# Patient Record
Sex: FEMALE | Race: WHITE | NOT HISPANIC OR LATINO | Employment: FULL TIME | URBAN - METROPOLITAN AREA
[De-identification: names, ages, dates, MRNs, and addresses within clinical notes are randomized per-mention and may not be internally consistent; named-entity substitution may affect disease eponyms.]

---

## 2021-03-25 ENCOUNTER — IMMUNIZATION (OUTPATIENT)
Dept: NURSING | Facility: CLINIC | Age: 51
End: 2021-03-25
Payer: COMMERCIAL

## 2021-03-25 PROCEDURE — 0001A PR COVID VAC PFIZER DIL RECON 30 MCG/0.3 ML IM: CPT

## 2021-03-25 PROCEDURE — 91300 PR COVID VAC PFIZER DIL RECON 30 MCG/0.3 ML IM: CPT

## 2021-04-15 ENCOUNTER — IMMUNIZATION (OUTPATIENT)
Dept: NURSING | Facility: CLINIC | Age: 51
End: 2021-04-15
Attending: FAMILY MEDICINE
Payer: COMMERCIAL

## 2021-04-15 PROCEDURE — 91300 PR COVID VAC PFIZER DIL RECON 30 MCG/0.3 ML IM: CPT

## 2021-04-15 PROCEDURE — 0002A PR COVID VAC PFIZER DIL RECON 30 MCG/0.3 ML IM: CPT

## 2021-04-18 ENCOUNTER — HEALTH MAINTENANCE LETTER (OUTPATIENT)
Age: 51
End: 2021-04-18

## 2021-10-02 ENCOUNTER — HEALTH MAINTENANCE LETTER (OUTPATIENT)
Age: 51
End: 2021-10-02

## 2022-05-14 ENCOUNTER — HEALTH MAINTENANCE LETTER (OUTPATIENT)
Age: 52
End: 2022-05-14

## 2022-05-26 ENCOUNTER — OFFICE VISIT (OUTPATIENT)
Dept: FAMILY MEDICINE | Facility: CLINIC | Age: 52
End: 2022-05-26
Payer: COMMERCIAL

## 2022-05-26 VITALS
DIASTOLIC BLOOD PRESSURE: 80 MMHG | HEART RATE: 97 BPM | BODY MASS INDEX: 36.54 KG/M2 | OXYGEN SATURATION: 97 % | WEIGHT: 214 LBS | TEMPERATURE: 98.7 F | SYSTOLIC BLOOD PRESSURE: 144 MMHG | HEIGHT: 64 IN

## 2022-05-26 DIAGNOSIS — M54.50 ACUTE MIDLINE LOW BACK PAIN WITHOUT SCIATICA: Primary | ICD-10-CM

## 2022-05-26 LAB
ALBUMIN UR-MCNC: NEGATIVE MG/DL
APPEARANCE UR: CLEAR
BACTERIA #/AREA URNS HPF: ABNORMAL /HPF
BILIRUB UR QL STRIP: ABNORMAL
COLOR UR AUTO: YELLOW
GLUCOSE UR STRIP-MCNC: NEGATIVE MG/DL
HGB UR QL STRIP: ABNORMAL
HYALINE CASTS #/AREA URNS LPF: ABNORMAL /LPF
KETONES UR STRIP-MCNC: NEGATIVE MG/DL
LEUKOCYTE ESTERASE UR QL STRIP: NEGATIVE
MUCOUS THREADS #/AREA URNS LPF: PRESENT /LPF
NITRATE UR QL: NEGATIVE
PH UR STRIP: 5 [PH] (ref 5–7)
RBC #/AREA URNS AUTO: ABNORMAL /HPF
SP GR UR STRIP: >=1.03 (ref 1–1.03)
SQUAMOUS #/AREA URNS AUTO: ABNORMAL /LPF
URATE CRY #/AREA URNS HPF: ABNORMAL /HPF
UROBILINOGEN UR STRIP-ACNC: 0.2 E.U./DL
WBC #/AREA URNS AUTO: ABNORMAL /HPF

## 2022-05-26 PROCEDURE — 99203 OFFICE O/P NEW LOW 30 MIN: CPT | Performed by: PHYSICIAN ASSISTANT

## 2022-05-26 PROCEDURE — 81001 URINALYSIS AUTO W/SCOPE: CPT | Performed by: PHYSICIAN ASSISTANT

## 2022-05-26 RX ORDER — BUSPIRONE HYDROCHLORIDE 30 MG/1
1 TABLET ORAL 2 TIMES DAILY
COMMUNITY
Start: 2022-05-02 | End: 2024-07-02

## 2022-05-26 RX ORDER — PREDNISONE 20 MG/1
TABLET ORAL
Qty: 20 TABLET | Refills: 0 | Status: SHIPPED | OUTPATIENT
Start: 2022-05-26 | End: 2022-06-07

## 2022-05-26 RX ORDER — CYCLOBENZAPRINE HCL 5 MG
5 TABLET ORAL
Qty: 12 TABLET | Refills: 1 | Status: SHIPPED | OUTPATIENT
Start: 2022-05-26 | End: 2022-06-07

## 2022-05-26 RX ORDER — SENNOSIDES 8.6 MG
650 CAPSULE ORAL EVERY 8 HOURS PRN
Qty: 60 TABLET | Refills: 1 | Status: SHIPPED | OUTPATIENT
Start: 2022-05-26 | End: 2024-07-02

## 2022-05-26 ASSESSMENT — ASTHMA QUESTIONNAIRES: ACT_TOTALSCORE: 25

## 2022-05-26 ASSESSMENT — ANXIETY QUESTIONNAIRES
3. WORRYING TOO MUCH ABOUT DIFFERENT THINGS: NOT AT ALL
7. FEELING AFRAID AS IF SOMETHING AWFUL MIGHT HAPPEN: NOT AT ALL
5. BEING SO RESTLESS THAT IT IS HARD TO SIT STILL: NOT AT ALL
1. FEELING NERVOUS, ANXIOUS, OR ON EDGE: NOT AT ALL
IF YOU CHECKED OFF ANY PROBLEMS ON THIS QUESTIONNAIRE, HOW DIFFICULT HAVE THESE PROBLEMS MADE IT FOR YOU TO DO YOUR WORK, TAKE CARE OF THINGS AT HOME, OR GET ALONG WITH OTHER PEOPLE: SOMEWHAT DIFFICULT
GAD7 TOTAL SCORE: 4
GAD7 TOTAL SCORE: 4
6. BECOMING EASILY ANNOYED OR IRRITABLE: MORE THAN HALF THE DAYS
2. NOT BEING ABLE TO STOP OR CONTROL WORRYING: NOT AT ALL

## 2022-05-26 ASSESSMENT — PATIENT HEALTH QUESTIONNAIRE - PHQ9
SUM OF ALL RESPONSES TO PHQ QUESTIONS 1-9: 3
5. POOR APPETITE OR OVEREATING: MORE THAN HALF THE DAYS

## 2022-05-26 NOTE — PROGRESS NOTES
Assessment & Plan     Acute midline low back pain without sciatica  - UA macro with reflex to Microscopic and Culture - [facility] Collect  - XR Lumbar Spine 2/3 Views; Future  - predniSONE (DELTASONE) 20 MG tablet; Take 3 tabs by mouth daily x 3 days, then 2 tabs daily x 3 days, then 1 tab daily x 3 days, then 1/2 tab daily x 3 days.  - acetaminophen (TYLENOL) 650 MG CR tablet; Take 1 tablet (650 mg) by mouth every 8 hours as needed for pain  - cyclobenzaprine (FLEXERIL) 5 MG tablet; Take 1 tablet (5 mg) by mouth at bedtime as needed, may repeat once for muscle spasms  - Physical Therapy Referral; Future            Return in about 6 weeks (around 7/7/2022) for follow up if symptoms persist, change or worsen.    Mercy Kaur PA-C  [facility]RI Quinn is a 51 year old who presents for the following health issues  accompanied by her self.    History of Present Illness       Back Pain:  She presents for follow up of back pain. Patient's back pain is a new problem.    Original cause of back pain: not sure  First noticed back pain: 1-4 weeks ago  Patient feels back pain: constantlyLocation of back pain:  Right lower back and left lower back  Description of back pain: dull ache, sharp, shooting and stabbing  Back pain spreads: right buttocks and left buttocks    Since patient first noticed back pain, pain is: gradually worsening  Does back pain interfere with her job:  Yes  On a scale of 1-10 (10 being the worst), patient describes pain as:  5  What makes back pain worse: bending, coughing, certain positions, lying down, sitting and standing  Acupuncture: not tried  Acetaminophen: not helpful  Activity or exercise: not helpful  Chiropractor:  Not tried  Cold: not helpful  Heat: not helpful  Massage: not tried  Muscle relaxants: not tried  NSAIDS: not helpful  Opioids: not tried  Physical Therapy: not tried  Rest: not helpful  Steroid Injection: not tried  Stretching: not  "helpful  Surgery: not tried  TENS unit: not tried  Topical pain relievers: not helpful  Other healthcare providers patient is seeing for back pain: None    She eats 0-1 servings of fruits and vegetables daily.She consumes 1 sweetened beverage(s) daily.She exercises with enough effort to increase her heart rate 9 or less minutes per day.  She exercises with enough effort to increase her heart rate 3 or less days per week.   She is taking medications regularly.     Patient hasn't had these sx before.  Notes that heat helps better than cold.      Review of Systems   Constitutional, HEENT, cardiovascular, pulmonary, gi and gu systems are negative, except as otherwise noted.      Objective    BP (!) 144/80   Pulse 97   Temp 98.7  F (37.1  C) (Oral)   Ht 1.613 m (5' 3.5\")   Wt 97.1 kg (214 lb)   SpO2 97%   BMI 37.31 kg/m    Body mass index is 37.31 kg/m .     Physical Exam   GENERAL: healthy, alert and no distress  MS: no gross musculoskeletal defects noted, no edema  SKIN: no suspicious lesions or rashes  Comprehensive back pain exam:  No tenderness, Pain limits the following motions: forward flexion, Lower extremity strength functional and equal on both sides, Lower extremity reflexes within normal limits bilaterally and Straight leg raise negative bilaterally                "

## 2022-06-03 ASSESSMENT — ENCOUNTER SYMPTOMS
ABDOMINAL PAIN: 0
DYSURIA: 0
FEVER: 0
ARTHRALGIAS: 1
DIARRHEA: 0
BREAST MASS: 0
SHORTNESS OF BREATH: 0
WEAKNESS: 0
NAUSEA: 0
EYE PAIN: 0
DIZZINESS: 0
FREQUENCY: 0
CHILLS: 0
HEMATURIA: 1
COUGH: 0
HEMATOCHEZIA: 0
CONSTIPATION: 0
HEADACHES: 0
SORE THROAT: 0
PALPITATIONS: 0
JOINT SWELLING: 0
NERVOUS/ANXIOUS: 0
MYALGIAS: 0
PARESTHESIAS: 0
HEARTBURN: 0

## 2022-06-06 ENCOUNTER — THERAPY VISIT (OUTPATIENT)
Dept: PHYSICAL THERAPY | Facility: CLINIC | Age: 52
End: 2022-06-06
Attending: PHYSICIAN ASSISTANT
Payer: COMMERCIAL

## 2022-06-06 DIAGNOSIS — M54.50 BILATERAL LOW BACK PAIN WITHOUT SCIATICA: ICD-10-CM

## 2022-06-06 DIAGNOSIS — M54.50 ACUTE MIDLINE LOW BACK PAIN WITHOUT SCIATICA: ICD-10-CM

## 2022-06-06 PROCEDURE — 97110 THERAPEUTIC EXERCISES: CPT | Mod: GP | Performed by: PHYSICAL THERAPIST

## 2022-06-06 PROCEDURE — 97161 PT EVAL LOW COMPLEX 20 MIN: CPT | Mod: GP | Performed by: PHYSICAL THERAPIST

## 2022-06-06 PROCEDURE — 97112 NEUROMUSCULAR REEDUCATION: CPT | Mod: GP | Performed by: PHYSICAL THERAPIST

## 2022-06-06 NOTE — PROGRESS NOTES
Physical Therapy Initial Evaluation  Subjective:  The history is provided by the patient.   Patient Health History  Cheyenne Schulte being seen for LBP.     Problem began: 5/26/2022.   Problem occurred: insidious   Pain is reported as 5/10 on pain scale.  General health as reported by patient is fair.  Pertinent medical history includes: asthma, depression, history of fractures, migraines/headaches, overweight and smoking.   Red flags:  None as reported by patient.  Medical allergies: other. Other medical allergies details: Wellbutrin, theophylline.   Surgeries include:  Orthopedic surgery. Other surgery history details: ORIF R lower leg.    Current medications:  Anti-depressants, anti-inflammatory, muscle relaxants and steroids. Other medications details: oral steroids with last day today which has helped by about 50-60 %.    Current occupation is  operations.   Primary job tasks include:  Computer work and prolonged sitting.                  Therapist Generated HPI Evaluation  Problem details: Patient reports that she has had LBP for about a month or so for no known reason and on 5-14-22, she was out hiking/walking with her dog [place] and she felt much worse after this. MD order from 5-26-22..         Type of problem:  Lumbar.    This is a new condition.  Condition occurred with:  Insidious onset.  Where condition occurred: for unknown reasons.  Patient reports pain:  Central lumbar spine.  Pain is described as sharp and aching and is constant (constant ache and intermittent sharp).  Pain radiates to:  No radiation. Pain is worse in the A.M..  Since onset symptoms are gradually improving (with the steroids, but now the same).  Associated symptoms:  Loss of motion/stiffness. Symptoms are exacerbated by bending, lifting, other and sitting (BB, sit marya 45' with 5/10 pain)  and relieved by other, ice and analgesics (steroids, marijuana).  Special tests included:  X-ray  (see Epic).    Restrictions due to condition include:  Working in normal job without restrictions.  Barriers include:  None as reported by patient.                        Objective:  System         Lumbar/SI Evaluation  ROM:    AROM Lumbar:   Flexion:          Fingertips to mid thigh and increase  Ext:                    Mod loss and increase   Side Bend:        Left:     Right:   Rotation:           Left:     Right:   Side Glide:        Left:  Min loss and slight ache    Right:  Min loss and slight ache          Lumbar Myotomes:  normal            Lumbar DTR's:  normal      Cord Signs:  normal    Lumbar Dermtomes:  not assessed                Neural Tension/Mobility:  Lumbar:  Normal (+ cough/sneeze)        Lumbar Palpation:    Tenderness present at Left:    Quadratus Lumborum and Erector Spinae  Tenderness present at Right: Quadratus Lumborum and Erector Spinae      Spinal Segmental Conclusions:     Level: Hypo noted at L3, L2, S1, L5 and L4                                                   General     ROS    Assessment/Plan:    Patient is a 51 year old female with lumbar complaints.    Patient has the following significant findings with corresponding treatment plan.                Diagnosis 1:  LBP  Pain -  US, manual therapy, self management, education, directional preference exercise and home program  Decreased ROM/flexibility - manual therapy, therapeutic exercise and home program  Impaired muscle performance - neuro re-education and home program  Decreased function - therapeutic activities and home program  Impaired posture - neuro re-education and home program    Therapy Evaluation Codes:   1) History comprised of:   Personal factors that impact the plan of care:      None.    Comorbidity factors that impact the plan of care are:      None.     Medications impacting care: None.  2) Examination of Body Systems comprised of:   Body structures and functions that impact the plan of care:      Lumbar  spine.   Activity limitations that impact the plan of care are:      Bending, Dressing, Lifting and Sitting.  3) Clinical presentation characteristics are:   Stable/Uncomplicated.  4) Decision-Making    Low complexity using standardized patient assessment instrument and/or measureable assessment of functional outcome.  Cumulative Therapy Evaluation is: Low complexity.    Previous and current functional limitations:  (See Goal Flow Sheet for this information)    Short term and Long term goals: (See Goal Flow Sheet for this information)     Communication ability:  Patient appears to be able to clearly communicate and understand verbal and written communication and follow directions correctly.  Treatment Explanation - The following has been discussed with the patient:   RX ordered/plan of care  Anticipated outcomes  Possible risks and side effects  This patient would benefit from PT intervention to resume normal activities.   Rehab potential is good.    Frequency:  1 X week, once daily  Duration:  for 8 weeks  Discharge Plan:  Achieve all LTG.  Independent in home treatment program.  Reach maximal therapeutic benefit.    Please refer to the daily flowsheet for treatment today, total treatment time and time spent performing 1:1 timed codes.

## 2022-06-07 ENCOUNTER — TELEPHONE (OUTPATIENT)
Dept: GASTROENTEROLOGY | Facility: CLINIC | Age: 52
End: 2022-06-07

## 2022-06-07 ENCOUNTER — OFFICE VISIT (OUTPATIENT)
Dept: FAMILY MEDICINE | Facility: CLINIC | Age: 52
End: 2022-06-07
Payer: COMMERCIAL

## 2022-06-07 VITALS
HEART RATE: 85 BPM | HEIGHT: 64 IN | WEIGHT: 219.6 LBS | DIASTOLIC BLOOD PRESSURE: 86 MMHG | TEMPERATURE: 97.8 F | BODY MASS INDEX: 37.49 KG/M2 | SYSTOLIC BLOOD PRESSURE: 134 MMHG | OXYGEN SATURATION: 99 %

## 2022-06-07 DIAGNOSIS — Z12.11 SCREEN FOR COLON CANCER: ICD-10-CM

## 2022-06-07 DIAGNOSIS — Z12.31 ENCOUNTER FOR SCREENING MAMMOGRAM FOR MALIGNANT NEOPLASM OF BREAST: ICD-10-CM

## 2022-06-07 DIAGNOSIS — Z23 HIGH PRIORITY FOR 2019-NCOV VACCINE: ICD-10-CM

## 2022-06-07 DIAGNOSIS — Z00.00 ROUTINE GENERAL MEDICAL EXAMINATION AT A HEALTH CARE FACILITY: Primary | ICD-10-CM

## 2022-06-07 DIAGNOSIS — Z12.31 VISIT FOR SCREENING MAMMOGRAM: ICD-10-CM

## 2022-06-07 DIAGNOSIS — M25.551 HIP PAIN, RIGHT: ICD-10-CM

## 2022-06-07 DIAGNOSIS — L98.9 SKIN LESION: ICD-10-CM

## 2022-06-07 DIAGNOSIS — R73.9 HYPERGLYCEMIA: ICD-10-CM

## 2022-06-07 DIAGNOSIS — Z23 NEED FOR VACCINATION: ICD-10-CM

## 2022-06-07 DIAGNOSIS — Z13.220 SCREENING FOR HYPERLIPIDEMIA: ICD-10-CM

## 2022-06-07 LAB
ALBUMIN SERPL-MCNC: 3.8 G/DL (ref 3.4–5)
ALP SERPL-CCNC: 75 U/L (ref 40–150)
ALT SERPL W P-5'-P-CCNC: 22 U/L (ref 0–50)
ANION GAP SERPL CALCULATED.3IONS-SCNC: 3 MMOL/L (ref 3–14)
AST SERPL W P-5'-P-CCNC: 4 U/L (ref 0–45)
BILIRUB SERPL-MCNC: 0.4 MG/DL (ref 0.2–1.3)
BUN SERPL-MCNC: 14 MG/DL (ref 7–30)
CALCIUM SERPL-MCNC: 8.9 MG/DL (ref 8.5–10.1)
CHLORIDE BLD-SCNC: 109 MMOL/L (ref 94–109)
CHOLEST SERPL-MCNC: 235 MG/DL
CO2 SERPL-SCNC: 29 MMOL/L (ref 20–32)
CREAT SERPL-MCNC: 0.82 MG/DL (ref 0.52–1.04)
FASTING STATUS PATIENT QL REPORTED: ABNORMAL
GFR SERPL CREATININE-BSD FRML MDRD: 87 ML/MIN/1.73M2
GLUCOSE BLD-MCNC: 101 MG/DL (ref 70–99)
HBA1C MFR BLD: 5.6 % (ref 0–5.6)
HDLC SERPL-MCNC: 79 MG/DL
LDLC SERPL CALC-MCNC: 120 MG/DL
NONHDLC SERPL-MCNC: 156 MG/DL
POTASSIUM BLD-SCNC: 4.7 MMOL/L (ref 3.4–5.3)
PROT SERPL-MCNC: 6.8 G/DL (ref 6.8–8.8)
SODIUM SERPL-SCNC: 141 MMOL/L (ref 133–144)
TRIGL SERPL-MCNC: 182 MG/DL

## 2022-06-07 PROCEDURE — 90471 IMMUNIZATION ADMIN: CPT | Performed by: FAMILY MEDICINE

## 2022-06-07 PROCEDURE — 99214 OFFICE O/P EST MOD 30 MIN: CPT | Mod: 25 | Performed by: FAMILY MEDICINE

## 2022-06-07 PROCEDURE — 91305 COVID-19,PF,PFIZER (12+ YRS): CPT | Performed by: FAMILY MEDICINE

## 2022-06-07 PROCEDURE — 99396 PREV VISIT EST AGE 40-64: CPT | Mod: 25 | Performed by: FAMILY MEDICINE

## 2022-06-07 PROCEDURE — 80061 LIPID PANEL: CPT | Performed by: FAMILY MEDICINE

## 2022-06-07 PROCEDURE — 83036 HEMOGLOBIN GLYCOSYLATED A1C: CPT | Performed by: FAMILY MEDICINE

## 2022-06-07 PROCEDURE — 90750 HZV VACC RECOMBINANT IM: CPT | Performed by: FAMILY MEDICINE

## 2022-06-07 PROCEDURE — 36415 COLL VENOUS BLD VENIPUNCTURE: CPT | Performed by: FAMILY MEDICINE

## 2022-06-07 PROCEDURE — 90472 IMMUNIZATION ADMIN EACH ADD: CPT | Performed by: FAMILY MEDICINE

## 2022-06-07 PROCEDURE — 90677 PCV20 VACCINE IM: CPT | Performed by: FAMILY MEDICINE

## 2022-06-07 PROCEDURE — 0054A COVID-19,PF,PFIZER (12+ YRS): CPT | Performed by: FAMILY MEDICINE

## 2022-06-07 PROCEDURE — 80053 COMPREHEN METABOLIC PANEL: CPT | Performed by: FAMILY MEDICINE

## 2022-06-07 ASSESSMENT — ENCOUNTER SYMPTOMS
DIARRHEA: 0
CONSTIPATION: 0
PARESTHESIAS: 0
NERVOUS/ANXIOUS: 0
FEVER: 0
PALPITATIONS: 0
DIZZINESS: 0
SORE THROAT: 0
MYALGIAS: 0
HEMATURIA: 1
CHILLS: 0
HEMATOCHEZIA: 0
BREAST MASS: 0
EYE PAIN: 0
HEARTBURN: 0
HEADACHES: 0
ABDOMINAL PAIN: 0
FREQUENCY: 0
DYSURIA: 0
NAUSEA: 0
ARTHRALGIAS: 1
JOINT SWELLING: 0
SHORTNESS OF BREATH: 0
COUGH: 0
WEAKNESS: 0

## 2022-06-07 NOTE — TELEPHONE ENCOUNTER
Screening Questions  BlueKIND OF PREP RedLOCATION [review exclusion criteria] GreenSEDATION TYPE      1. Are you able to give consent for your medical care? Do you have a legal guardian or medical Power of ?  N (Sedation review/consideration needed)    2. Have you had a positive covid test in the last 90 days? N  a. If yes, what date?    3. Are you active on mychart? Y    4. What insurance is in the chart? [employer]     3.   Ordering/Referring Provider: Jh Marks    4. BMI 38.2   [BMI OVER 40-EXTENDED PREP]  If greater than 40 review exclusion criteria [PAC APPT IF @ [facility]]        5.  Respiratory Screening :  [If yes to any of the following HOSPITAL setting only]     Do you use daily home oxygen? N    Do you have mod to severe Obstructive Sleep Apnea? N  [OKAY @ [facility] SH PH RI]   Do you have Pulmonary Hypertension? N     Do you have UNCONTROLLED asthma? N        6.   Have you had a heart or lung transplant? N      7.   Are you currently on dialysis? N [ If yes, G-PREP & HOSPITAL setting only]     8.   Do you have chronic kidney disease? N [ If yes, G-PREP ]    9.   Have you had a stroke or Transient ischemic attack (TIA - aka  mini stroke ) within 6 months?  N (If yes, please review exclusion criteria)    10.   In the past 6 months, have you had any heart related issues including cardiomyopathy or heart attack? N           If yes, did it require cardiac stenting or other implantable device? N      11.   Do you have any implantable devices in your body (pacemaker, defib, LVAD)? N (If yes, please review exclusion criteria)    12.   Do you take nitroglycerin? N           If yes, how often? N  (if yes, HOSPITAL setting ONLY)    13.   Are you currently taking any blood thinners? N           [IF YES, INFORM PATIENT TO FOLLOW UP W/ ORDERING PROVIDER FOR BRIDGING INSTRUCTIONS]     14.   Do you have a diagnosis of diabetes? N   [ If yes, G-PREP ]    15.   [FEMALES] Are you  currently pregnant? N    If yes, how many weeks? N    16.   Are you taking any prescription pain medications on a routine schedule?  N  [ If yes, EXTENDED PREP.] [If yes, MAC]    17.   Do you have any chemical dependencies such as alcohol, street drugs, or methadone?  N [If yes, MAC]    18.   Do you have any history of post-traumatic stress syndrome, severe anxiety or history of psychosis?  N  [If yes, MAC]    19.   Do you transfer independently?  Y    20.  On a regular basis do you go 3-5 days between bowel movements? N   [ If yes, EXTENDED PREP.]    21.   Preferred LOCAL Pharmacy for Pre Prescription Y  [id number] - [place] AT [address] [facility] [address]      Scheduling Details      Caller : Janice  (Please ask for phone number if not scheduled by patient)    Type of Procedure Scheduled: Colon  Which Colonoscopy Prep was Sent?: M Prep  [facility] CF PATIENTS & [facility]S PATIENTS REQUIRE EXTENDED PREP  Surgeon: Avinash  Date of Procedure: 7-13  Location:       Sedation Type: CS  Conscious Sedation- Needs  for 6 hours after the procedure  MAC/General-Needs  for 24 hours after procedure    Pre-op Required at [facility], [place], [place] and OR for MAC sedation: Y  (advise patient they will need a pre-op prior to procedure -)      Informed patient they will need an adult  Y  Cannot take any type of public or medical transportation alone    Pre-Procedure Covid test to be completed at [facility] or Externally: Y at home test    Confirmed Nurse will call to complete assessment Y    Additional comments:

## 2022-06-07 NOTE — PROGRESS NOTES
SUBJECTIVE:   CC: Cheyenne Schulte is an 51 year old woman who presents for preventive health visit.   Patient has been advised of split billing requirements and indicates understanding: Yes  Healthy Habits:     Getting at least 3 servings of Calcium per day:  NO    Bi-annual eye exam:  Yes    Dental care twice a year:  Yes    Sleep apnea or symptoms of sleep apnea:  None    Diet:  Regular (no restrictions)    Frequency of exercise:  None    Taking medications regularly:  Yes    Barriers to taking medications:  None    Medication side effects:  None    PHQ-2 Total Score: 0    Additional concerns today:  Yes (Right hip pain hurt at PT yesterday has been going on off and on for 2 years, discuss birth control options)      Patient presents for physical.  Right hip pain for the last few years pain is localized to the anterior groin.  Pain is worse with hip flexion.  Pain was noticed most recently had physical therapy for her lower back she was unable to do twisting exercises.  She is worried about this because she has a strong family history of hip arthritis.    Patient has a strong family history of diabetes and would like to be screened for this.  Patient has colon cancer diagnosed in first-degree relatives and would like to start screening for this as well.    Patient would like to discuss birth control options.  Patient smokes and is over 35.  Patient has menopausal type symptoms.    Patient would like to discuss skin lesion x2.  First lesion localized to the posterior aspect of the scalp.  Lesion is palpable.  She has noticed this for the last few years.  Second lesion is on the right side of the face.  She has noticed this for the last couple of years.  No recent changes in size or color to the lesion.    Today's PHQ-2 Score:   PHQ-2 ( 1999 Pfizer) 6/3/2022   Q1: Little interest or pleasure in doing things 0   Q2: Feeling down, depressed or hopeless 0   PHQ-2 Score 0   Q1: Little interest or pleasure in doing  things Not at all   Q2: Feeling down, depressed or hopeless Not at all   PHQ-2 Score 0       Abuse: Current or Past (Physical, Sexual or Emotional) - No  Do you feel safe in your environment? Yes    Have you ever done Advance Care Planning? (For example, a Health Directive, POLST, or a discussion with a medical provider or your loved ones about your wishes): No, advance care planning information given to patient to review.  Patient declined advance care planning discussion at this time.    Social History     Tobacco Use     Smoking status: Current Every Day Smoker     Packs/day: 0.50     Types: Cigarettes     Smokeless tobacco: Never Used   Substance Use Topics     Alcohol use: Yes     If you drink alcohol do you typically have >3 drinks per day or >7 drinks per week? No    Alcohol Use 6/3/2022   Prescreen: >3 drinks/day or >7 drinks/week? No   No flowsheet data found.    Reviewed orders with patient.  Reviewed health maintenance and updated orders accordingly - Yes  BP Readings from Last 3 Encounters:   06/07/22 134/86   05/26/22 (!) 144/80   01/30/12 112/62    Wt Readings from Last 3 Encounters:   06/07/22 99.6 kg (219 lb 9.6 oz)   05/26/22 97.1 kg (214 lb)   01/30/12 77.9 kg (171 lb 12.8 oz)                    Breast Cancer Screening:    FHS-7:   Breast CA Risk Assessment (FHS-7) 6/3/2022   Did any of your first-degree relatives have breast or ovarian cancer? No   Did any of your relatives have bilateral breast cancer? No   Did any man in your family have breast cancer? No   Did any woman in your family have breast and ovarian cancer? No   Did any woman in your family have breast cancer before age 50 y? No   Do you have 2 or more relatives with breast and/or ovarian cancer? No   Do you have 2 or more relatives with breast and/or bowel cancer? No       Mammogram Screening: Recommended annual mammography  Pertinent mammograms are reviewed under the imaging tab.    History of abnormal Pap smear: NO - age 30-65 PAP  "every 5 years with negative HPV co-testing recommended     Reviewed and updated as needed this visit by clinical staff   Tobacco  Allergies  Meds                Reviewed and updated as needed this visit by Provider                       Review of Systems   Constitutional: Negative for chills and fever.   HENT: Positive for hearing loss. Negative for congestion, ear pain and sore throat.    Eyes: Negative for pain and visual disturbance.   Respiratory: Negative for cough and shortness of breath.    Cardiovascular: Negative for chest pain, palpitations and peripheral edema.   Gastrointestinal: Negative for abdominal pain, constipation, diarrhea, heartburn, hematochezia and nausea.   Breasts:  Negative for tenderness, breast mass and discharge.   Genitourinary: Positive for hematuria. Negative for dysuria, frequency, genital sores, pelvic pain, urgency, vaginal bleeding and vaginal discharge.   Musculoskeletal: Positive for arthralgias. Negative for joint swelling and myalgias.   Skin: Negative for rash.   Neurological: Negative for dizziness, weakness, headaches and paresthesias.   Psychiatric/Behavioral: Negative for mood changes. The patient is not nervous/anxious.      dermoscopy done for aforementioned skin lesions.     OBJECTIVE:   /86   Pulse 85   Temp 97.8  F (36.6  C) (Oral)   Ht 1.613 m (5' 3.5\")   Wt 99.6 kg (219 lb 9.6 oz)   SpO2 99%   BMI 38.29 kg/m    Physical Exam  Constitutional:       General: She is not in acute distress.     Appearance: She is well-developed.   HENT:      Right Ear: Tympanic membrane and external ear normal.      Left Ear: Tympanic membrane and external ear normal.      Nose: Nose normal.      Mouth/Throat:      Pharynx: No oropharyngeal exudate.   Eyes:      General:         Right eye: No discharge.         Left eye: No discharge.      Conjunctiva/sclera: Conjunctivae normal.      Pupils: Pupils are equal, round, and reactive to light.   Neck:      Thyroid: No " thyromegaly.      Trachea: No tracheal deviation.   Cardiovascular:      Rate and Rhythm: Normal rate and regular rhythm.      Pulses: Normal pulses.      Heart sounds: Normal heart sounds, S1 normal and S2 normal. No murmur heard.    No friction rub. No S3 or S4 sounds.   Pulmonary:      Effort: Pulmonary effort is normal. No respiratory distress.      Breath sounds: Normal breath sounds. No wheezing or rales.   Abdominal:      General: Bowel sounds are normal.      Palpations: Abdomen is soft. There is no mass.      Tenderness: There is no abdominal tenderness.   Musculoskeletal:         General: Normal range of motion.      Cervical back: Neck supple.      Comments: Right groin pain with resisted hip flexion, passive hip rotation   Lymphadenopathy:      Cervical: No cervical adenopathy.   Skin:     General: Skin is warm and dry.      Findings: No rash.   Neurological:      Mental Status: She is alert and oriented to person, place, and time.      Motor: No abnormal muscle tone.      Deep Tendon Reflexes: Reflexes are normal and symmetric.   Psychiatric:         Thought Content: Thought content normal.         Judgment: Judgment normal.               ASSESSMENT/PLAN:       ICD-10-CM    1. Routine general medical examination at a health care facility  Z00.00 REVIEW OF HEALTH MAINTENANCE PROTOCOL ORDERS     Ob/Gyn Referral     Adult Gastro Ref - Procedure Only     Hemoglobin A1c     Comprehensive metabolic panel     Lipid panel reflex to direct LDL Fasting   2. Visit for screening mammogram  Z12.31    3. Screen for colon cancer  Z12.11 Adult Gastro Ref - Procedure Only   4. Screening for hyperlipidemia  Z13.220 Lipid panel reflex to direct LDL Fasting   5. Hyperglycemia  R73.9 Hemoglobin A1c   6. Encounter for screening mammogram for malignant neoplasm of breast  Z12.31 MA Screening Digital Bilateral   7. Need for vaccination  Z23 Pneumococcal 20 Valent Conjugate (Prevnar 20)     SHINGRIX [[id number]]   8. High priority  "for 2019-nCoV vaccine  Z23 COVID-19,PF,PFIZER (12+ Yrs GRAY LABEL)   9. Hip pain, right  M25.551 XR Pelvis and Hip Right 1 View     From dermoscopic exam, skin lesions do not seem concerning.  We will continue to monitor.    Patient has been advised of split billing requirements and indicates understanding: Yes    COUNSELING:  Reviewed preventive health counseling, as reflected in patient instructions       Regular exercise       Healthy diet/nutrition    Estimated body mass index is 38.29 kg/m  as calculated from the following:    Height as of this encounter: 1.613 m (5' 3.5\").    Weight as of this encounter: 99.6 kg (219 lb 9.6 oz).    Weight management plan: Discussed healthy diet and exercise guidelines    She reports that she has been smoking cigarettes. She has been smoking about 0.50 packs per day. She has never used smokeless tobacco.  Tobacco Cessation Action Plan:   Information offered: Patient not interested at this time      Counseling Resources:  ATP IV Guidelines  Pooled Cohorts Equation Calculator  Breast Cancer Risk Calculator  BRCA-Related Cancer Risk Assessment: FHS-7 Tool  FRAX Risk Assessment  ICSI Preventive Guidelines  Dietary Guidelines for Americans, 2010  USDA's MyPlate  ASA Prophylaxis  Lung CA Screening    Jh Avalos MD  [facility]  "

## 2022-06-08 PROBLEM — M54.50 BILATERAL LOW BACK PAIN WITHOUT SCIATICA: Status: ACTIVE | Noted: 2022-06-08

## 2022-06-13 DIAGNOSIS — M25.551 HIP PAIN, RIGHT: Primary | ICD-10-CM

## 2022-07-13 ENCOUNTER — HOSPITAL ENCOUNTER (OUTPATIENT)
Facility: AMBULATORY SURGERY CENTER | Age: 52
Discharge: HOME | End: 2022-07-13
Attending: SURGERY | Admitting: SURGERY
Payer: COMMERCIAL

## 2022-07-13 VITALS
OXYGEN SATURATION: 98 % | SYSTOLIC BLOOD PRESSURE: 108 MMHG | TEMPERATURE: 97.4 F | RESPIRATION RATE: 16 BRPM | HEART RATE: 94 BPM | DIASTOLIC BLOOD PRESSURE: 81 MMHG

## 2022-07-13 LAB — COLONOSCOPY: NORMAL

## 2022-07-13 PROCEDURE — G8907 PT DOC NO EVENTS ON DISCHARG: HCPCS

## 2022-07-13 PROCEDURE — 45385 COLONOSCOPY W/LESION REMOVAL: CPT

## 2022-07-13 PROCEDURE — 88305 TISSUE EXAM BY PATHOLOGIST: CPT | Performed by: PATHOLOGY

## 2022-07-13 PROCEDURE — 99152 MOD SED SAME PHYS/QHP 5/>YRS: CPT | Mod: 59 | Performed by: SURGERY

## 2022-07-13 PROCEDURE — G8918 PT W/O PREOP ORDER IV AB PRO: HCPCS

## 2022-07-13 PROCEDURE — 99153 MOD SED SAME PHYS/QHP EA: CPT | Mod: 59 | Performed by: SURGERY

## 2022-07-13 PROCEDURE — 45385 COLONOSCOPY W/LESION REMOVAL: CPT | Mod: PT | Performed by: SURGERY

## 2022-07-13 RX ORDER — LIDOCAINE 40 MG/G
CREAM TOPICAL
Status: DISCONTINUED | OUTPATIENT
Start: 2022-07-13 | End: 2022-07-14 | Stop reason: HOSPADM

## 2022-07-13 RX ORDER — ONDANSETRON 4 MG/1
4 TABLET, ORALLY DISINTEGRATING ORAL EVERY 6 HOURS PRN
Status: CANCELLED | OUTPATIENT
Start: 2022-07-13

## 2022-07-13 RX ORDER — NALOXONE HYDROCHLORIDE 0.4 MG/ML
0.2 INJECTION, SOLUTION INTRAMUSCULAR; INTRAVENOUS; SUBCUTANEOUS
Status: CANCELLED | OUTPATIENT
Start: 2022-07-13

## 2022-07-13 RX ORDER — FLUMAZENIL 0.1 MG/ML
0.2 INJECTION, SOLUTION INTRAVENOUS
Status: CANCELLED | OUTPATIENT
Start: 2022-07-13 | End: 2022-07-13

## 2022-07-13 RX ORDER — ONDANSETRON 2 MG/ML
4 INJECTION INTRAMUSCULAR; INTRAVENOUS
Status: DISCONTINUED | OUTPATIENT
Start: 2022-07-13 | End: 2022-07-14 | Stop reason: HOSPADM

## 2022-07-13 RX ORDER — NALOXONE HYDROCHLORIDE 0.4 MG/ML
0.4 INJECTION, SOLUTION INTRAMUSCULAR; INTRAVENOUS; SUBCUTANEOUS
Status: CANCELLED | OUTPATIENT
Start: 2022-07-13

## 2022-07-13 RX ORDER — PROCHLORPERAZINE MALEATE 10 MG
10 TABLET ORAL EVERY 6 HOURS PRN
Status: CANCELLED | OUTPATIENT
Start: 2022-07-13

## 2022-07-13 RX ORDER — ONDANSETRON 2 MG/ML
4 INJECTION INTRAMUSCULAR; INTRAVENOUS EVERY 6 HOURS PRN
Status: CANCELLED | OUTPATIENT
Start: 2022-07-13

## 2022-07-13 RX ORDER — FENTANYL CITRATE 50 UG/ML
INJECTION, SOLUTION INTRAMUSCULAR; INTRAVENOUS PRN
Status: DISCONTINUED | OUTPATIENT
Start: 2022-07-13 | End: 2022-07-13 | Stop reason: HOSPADM

## 2022-07-15 LAB
PATH REPORT.COMMENTS IMP SPEC: NORMAL
PATH REPORT.COMMENTS IMP SPEC: NORMAL
PATH REPORT.FINAL DX SPEC: NORMAL
PATH REPORT.GROSS SPEC: NORMAL
PATH REPORT.MICROSCOPIC SPEC OTHER STN: NORMAL
PATH REPORT.RELEVANT HX SPEC: NORMAL
PHOTO IMAGE: NORMAL

## 2022-07-19 ENCOUNTER — OFFICE VISIT (OUTPATIENT)
Dept: OBGYN | Facility: CLINIC | Age: 52
End: 2022-07-19
Payer: COMMERCIAL

## 2022-07-19 VITALS
WEIGHT: 217.8 LBS | DIASTOLIC BLOOD PRESSURE: 83 MMHG | HEIGHT: 63 IN | HEART RATE: 79 BPM | SYSTOLIC BLOOD PRESSURE: 133 MMHG | BODY MASS INDEX: 38.59 KG/M2 | OXYGEN SATURATION: 96 %

## 2022-07-19 DIAGNOSIS — Z01.419 ENCOUNTER FOR GYNECOLOGICAL EXAMINATION WITHOUT ABNORMAL FINDING: Primary | ICD-10-CM

## 2022-07-19 DIAGNOSIS — Z12.4 PAP SMEAR FOR CERVICAL CANCER SCREENING: ICD-10-CM

## 2022-07-19 PROCEDURE — 87624 HPV HI-RISK TYP POOLED RSLT: CPT | Performed by: OBSTETRICS & GYNECOLOGY

## 2022-07-19 PROCEDURE — G0124 SCREEN C/V THIN LAYER BY MD: HCPCS

## 2022-07-19 PROCEDURE — G0145 SCR C/V CYTO,THINLAYER,RESCR: HCPCS | Performed by: OBSTETRICS & GYNECOLOGY

## 2022-07-19 PROCEDURE — 99386 PREV VISIT NEW AGE 40-64: CPT | Performed by: OBSTETRICS & GYNECOLOGY

## 2022-07-19 NOTE — PROGRESS NOTES
Cheyenne Schulte is a 51 year old female , who presents for annual exam.   No unusual bleeding, no incontinence, or unusual discharge.   She is currently using condoms for contraception.  She does not have any apparent contraindications to use.  She has not had any apparent complications with it's use.   Last cholesterol: Recent Labs   Lab Test 22  0845   CHOL 235*   HDL 79   *   TRIG 182*     Past Medical History:   Diagnosis Date     ADHD (attention deficit hyperactivity disorder)     sees psych      Intermittent asthma      Mild major depression (H)     sees psych        Past Surgical History:   Procedure Laterality Date     COLONOSCOPY N/A 2022    Procedure: COLONOSCOPY, FLEXIBLE, WITH LESION REMOVAL USING SNARE;  Surgeon: Shai Da Silva DO;  Location: [facility]     COLONOSCOPY N/A 2022    Procedure: COLONOSCOPY, WITH POLYPECTOMY AND BIOPSY;  Surgeon: Shai Da Silva DO;  Location: [facility]     COLONOSCOPY WITH CO2 INSUFFLATION N/A 2022    Procedure: COLONOSCOPY, WITH CO2 INSUFFLATION;  Surgeon: Shai Da Silva DO;  Location: [facility]     DILATION AND CURETTAGE      AB     GYN SURGERY      c section     HERNIA REPAIR      umbilical     ORTHOPEDIC SURGERY      ORIF right tibia       OB History    Para Term  AB Living   4 2 0 0 2 2   SAB IAB Ectopic Multiple Live Births   1 1 0 0 2      # Outcome Date GA Lbr Jared/2nd Weight Sex Delivery Anes PTL Lv   4 SAB            3 Para      CS-LTranv      2 Para            1 IAB                Gyn History:  Gynecological History         Patient's last menstrual period was 2022 (approximate).     No STD/No PID/she had a Mirena IUD between her children.      history of abnormal pap smear:  No  Last pap: No results found for: PAP        Current Outpatient Medications   Medication Sig Dispense Refill     amphetamine-dextroamphetamine (ADDERALL XR) 30 MG 24 hr capsule Take 30 mg by mouth daily.       busPIRone HCl (BUSPAR)  30 MG tablet Take 1 tablet by mouth daily       venlafaxine (EFFEXOR XR) 150 MG 24 hr capsule Take 150 mg by mouth daily.       acetaminophen (TYLENOL) 650 MG CR tablet Take 1 tablet (650 mg) by mouth every 8 hours as needed for pain (Patient not taking: No sig reported) 60 tablet 1     albuterol (PROVENTIL HFA: VENTOLIN HFA) 108 (90 BASE) MCG/ACT inhaler Inhale 2 puffs into the lungs every 6 hours. (Patient not taking: No sig reported) 1 Inhaler 3       Allergies   Allergen Reactions     Theophylline Cr      Zyban [Bupropion Hydrobromide]      Bupropion Rash       Social History     Socioeconomic History     Marital status:      Spouse name: Not on file     Number of children: Not on file     Years of education: Not on file     Highest education level: Not on file   Occupational History     Not on file   Tobacco Use     Smoking status: Current Every Day Smoker     Packs/day: 1.00     Types: Cigarettes     Smokeless tobacco: Never Used   Substance and Sexual Activity     Alcohol use: Yes     Alcohol/week: 4.0 standard drinks     Types: 4 Cans of beer per week     Comment: 4 drinks a week     Drug use: No     Sexual activity: Yes     Partners: Male     Birth control/protection: None   Other Topics Concern     Parent/sibling w/ CABG, MI or angioplasty before 65F 55M? Not Asked   Social History Narrative     Not on file     Social Determinants of Health     Financial Resource Strain: Not on file   Food Insecurity: Not on file   Transportation Needs: Not on file   Physical Activity: Not on file   Stress: Not on file   Social Connections: Not on file   Intimate Partner Violence: Not on file   Housing Stability: Not on file       Family History   Problem Relation Age of Onset     Arthritis Mother      Colon Cancer Mother      C.A.D. Father      Diabetes Father      Alcohol/Drug Father      Heart Disease Father          ROS:  All negative except as above.      EXAM:  /83 (BP Location: Right arm, Cuff Size:  "Adult Large)   Pulse 79   Ht 1.604 m (5' 3.15\")   Wt 98.8 kg (217 lb 12.8 oz)   LMP 06/24/2022 (Approximate)   SpO2 96%   BMI 38.40 kg/m    General:  WNWD female, NAD  Alert  Oriented x 3  Gait:  Normal  Skin:  Normal skin turgor  Neurologic:  CN grossly intact, good sensation.    HEENT:  NC/AT, EOMI  Neck:  No masses palpated, symmetrical, carotids +2/4, no bruits heard  Heart:  RRR  Lungs:  Clear   Breasts:  Symmetrical, no dimpling noted, no masses palpated, no discharge expressed  Abdomen:  Non-tender, non-distended.  Vulva: No external lesions, normal hair distribution, no adenopathy  BUS:  Normal, no masses noted  Urethra:  No hypermobility noted  Urethral meatus:  No masses noted  Vagina: Moist, pink, no abnormal discharge, well rugated, no lesions  Cervix: Smooth, pink, no visible lesions  Uterus: Normal size, anteverted, non-tender, mobile  Ovaries: No mass, non-tender, mobile  Perianal:  No masses noted.    Extremities:  No clubbing, cyanosis, or edema      ASSESSMENT/PLAN   Annual examination with pap smear   She will think about contraception options.  She is contemplating the IUD or the Depo Provera.  She will let us know.    Low fat diet, weight bearing exercises and self breast exams on a monthly basis have been recommended.  I have discussed with patient the risks, benefits, medications, treatment options and modalities.   I have instructed the patient to call or schedule a follow-up appointment if any problems.    "

## 2022-07-20 ENCOUNTER — MYC MEDICAL ADVICE (OUTPATIENT)
Dept: FAMILY MEDICINE | Facility: CLINIC | Age: 52
End: 2022-07-20

## 2022-07-20 DIAGNOSIS — Z12.11 SPECIAL SCREENING FOR MALIGNANT NEOPLASMS, COLON: Primary | ICD-10-CM

## 2022-07-20 DIAGNOSIS — K63.5 POLYP OF COLON, UNSPECIFIED PART OF COLON, UNSPECIFIED TYPE: ICD-10-CM

## 2022-07-20 NOTE — TELEPHONE ENCOUNTER
Routing to Dr. Avalos, the colonoscopy results are in the computer can you please provide a result message we can give the patient? Thanks!      Ariadne STOKES RN, BSN  [facility]

## 2022-07-21 NOTE — TELEPHONE ENCOUNTER
A total of 20 polyps removed.  Dr. Da Silva communicated pathology results:  None showed high grade dysplasia/malignancy.  Repeat colonoscopy in 3 months due to high # of polyps.    Jh Avalos MD

## 2022-07-22 NOTE — TELEPHONE ENCOUNTER
Dr. Degroot,     Spoke with pt and notified of below. Pt asking for a new colonoscopy referral so that she can schedule the 3 month follow up. Cued.     Thanks,  JAMAR Morton  [facility]

## 2022-07-25 LAB
BKR LAB AP GYN ADEQUACY: ABNORMAL
BKR LAB AP GYN INTERPRETATION: ABNORMAL
BKR LAB AP HPV REFLEX: ABNORMAL
BKR LAB AP PREVIOUS ABNORMAL: ABNORMAL
PATH REPORT.COMMENTS IMP SPEC: ABNORMAL
PATH REPORT.COMMENTS IMP SPEC: ABNORMAL
PATH REPORT.RELEVANT HX SPEC: ABNORMAL

## 2022-07-26 ENCOUNTER — TELEPHONE (OUTPATIENT)
Dept: GASTROENTEROLOGY | Facility: CLINIC | Age: 52
End: 2022-07-26

## 2022-07-26 NOTE — TELEPHONE ENCOUNTER
Screening Questions  BlueKIND OF PREP RedLOCATION [review exclusion criteria] GreenSEDATION TYPE      1.  YES Are you active on mychart?       2.  Jh Marks MD Ordering/Referring Provider:      3. [employer]  What insurance is in the chart?    4.  Y Do you have a legal guardian or medical Power of ?              Are you able to give consent for your medical care?                (Sedation review/consideration needed)      5.   38.4  BMI  [BMI OVER 40-EXTENDED PREP]  If greater than 40 review exclusion criteria [PAC APPT IF @ [facility]]       6.   N Have you had a positive covid test in the last 90 days?      7.   Respiratory Screening :  [If yes to any of the following HOSPITAL setting only]                     N Do you use daily home oxygen?   N Do you have mod to severe Obstructive Sleep Apnea?  [OKAY @ [facility] SH PH RI]                  N Do you have Pulmonary Hypertension?                   N Do you have UNCONTROLLED asthma?         8.   N Have you had a heart or lung transplant?       9.   N Are you currently on dialysis?   [ If yes, G-PREP & HOSPITAL setting only]      10.   N  Do you have chronic kidney disease?  [ If yes, G-PREP ]     11.  N Have you had a stroke or Transient ischemic attack (TIA - aka  mini stroke ) within 6 months?   (If yes, please review exclusion criteria)     12.   N In the past 6 months, have you had any heart related issues including cardiomyopathy or heart attack?           N If yes, did it require cardiac stenting or other implantable device?       13.   N Do you have any implantable devices in your body (pacemaker, defib, LVAD)?  (If yes, please review exclusion criteria)     14.   N Do you take nitroglycerin?            N If yes, how often? n  (if yes, HOSPITAL setting ONLY)     15.   N Are you currently taking any blood thinners?           [IF YES, INFORM PATIENT TO FOLLOW UP W/ ORDERING PROVIDER FOR BRIDGING INSTRUCTIONS]      16.    N   Do you have a diagnosis of diabetes?  [ If yes, G-PREP ]     17.   [FEMALES] N Are you currently pregnant?    N If yes, how many weeks?      18.    N  Are you taking any prescription pain medications on a routine schedule?    [ If yes, EXTENDED PREP.] [If yes, MAC]    22.  Do you take the medication Phentermine?     Yes-> Hold for 7 days before procedure.  Please consult your prescribing provider if you have questions about holding this medication.     No-> Continue to next question.     19.   N Do you have any chemical dependencies such as alcohol, street drugs, or methadone?   [If yes, MAC]     20.   N Do you have any history of post-traumatic stress syndrome, severe anxiety or history of psychosis?   [If yes, MAC]     21.   Y Do you transfer independently?       22.   N  On a regular basis do you go 3-5 days between bowel movements?  [ If yes, EXTENDED PREP.]     23.    Preferred LOCAL Pharmacy for Pre Prescription       [id number] - [address]           Scheduling Details         Candida : Caller   (Please ask for phone number if not scheduled by patient)    Lower Endoscopy [Colonoscopy] : Type of Procedure Scheduled   MPREP Which Colonoscopy Prep was Sent?      SORAYA Surgeon    10/28 Date of Procedure   [facility] Location    MOD Sedation Type     Conscious Sedation- Needs  for 6 hours after the procedure  MAC/General-Needs  for 24 hours after procedure     N Pre-op Required at [facility] [place], [place] and OR for MAC sedation   (advise patient they will need a pre-op prior to procedure -)       Y Informed patient they will need an adult    Cannot take any type of public or medical transportation alone     HOME Pre-Procedure Covid test to be completed at [facility] or Externally     Y  Confirmed Nurse will call to complete assessment     Additional comments:

## 2022-07-27 ENCOUNTER — TELEPHONE (OUTPATIENT)
Dept: OBGYN | Facility: CLINIC | Age: 52
End: 2022-07-27

## 2022-07-27 ENCOUNTER — PATIENT OUTREACH (OUTPATIENT)
Dept: OBGYN | Facility: CLINIC | Age: 52
End: 2022-07-27

## 2022-07-27 DIAGNOSIS — Z12.11 SPECIAL SCREENING FOR MALIGNANT NEOPLASMS, COLON: Primary | ICD-10-CM

## 2022-07-27 PROBLEM — R87.810 CERVICAL HIGH RISK HPV (HUMAN PAPILLOMAVIRUS) TEST POSITIVE: Status: ACTIVE | Noted: 2022-07-19

## 2022-07-27 LAB
HUMAN PAPILLOMA VIRUS 16 DNA: NEGATIVE
HUMAN PAPILLOMA VIRUS 18 DNA: NEGATIVE
HUMAN PAPILLOMA VIRUS FINAL DIAGNOSIS: ABNORMAL
HUMAN PAPILLOMA VIRUS OTHER HR: POSITIVE

## 2022-07-27 NOTE — TELEPHONE ENCOUNTER
[facility] Call Center    Phone Message    May a detailed message be left on voicemail: yes     Reason for Call: Pt is requesting a call back to scheduled a colposcopy.  Thanks.    Action Taken: Message routed to:  [facility] p [phone]    Travel Screening: Not Applicable

## 2022-08-09 PROBLEM — M54.50 BILATERAL LOW BACK PAIN WITHOUT SCIATICA: Status: RESOLVED | Noted: 2022-06-08 | Resolved: 2022-08-09

## 2022-09-03 ENCOUNTER — HEALTH MAINTENANCE LETTER (OUTPATIENT)
Age: 52
End: 2022-09-03

## 2022-09-07 ENCOUNTER — TELEPHONE (OUTPATIENT)
Dept: OBGYN | Facility: CLINIC | Age: 52
End: 2022-09-07

## 2022-09-07 DIAGNOSIS — Z30.42 ENCOUNTER FOR DEPO-PROVERA CONTRACEPTION: Primary | ICD-10-CM

## 2022-09-07 RX ORDER — MEDROXYPROGESTERONE ACETATE 150 MG/ML
150 INJECTION, SUSPENSION INTRAMUSCULAR
Status: COMPLETED | OUTPATIENT
Start: 2022-09-07 | End: 2023-06-07

## 2022-09-07 NOTE — TELEPHONE ENCOUNTER
[facility] Call Center    Phone Message    May a detailed message be left on voicemail: yes     Reason for Call: Other: Janice called wanting to start on the Depo Provera for birth control.  She wanted to make sure there wasn't going to be any concerns with the colposcopy if she started now.  She is also looking for a sooner appt for the colposcopy, if there is one.  But her primary concern is getting started on birth control.  Please request orders from Dr. Hsieh.  Please call her to schedule injection.     Action Taken: Message routed to:  [facility] p [phone]    Travel Screening: Not Applicable

## 2022-09-14 ENCOUNTER — MYC MEDICAL ADVICE (OUTPATIENT)
Dept: OBGYN | Facility: CLINIC | Age: 52
End: 2022-09-14

## 2022-09-14 ENCOUNTER — TELEPHONE (OUTPATIENT)
Dept: OBGYN | Facility: CLINIC | Age: 52
End: 2022-09-14

## 2022-09-14 NOTE — TELEPHONE ENCOUNTER
Called patient and helped schedule.    Lazara Weiner, [facility]  September 14, 2022

## 2022-09-14 NOTE — TELEPHONE ENCOUNTER
[facility] Call Center    Phone Message    May a detailed message be left on voicemail: yes     Reason for Call: The patient called to schedule Depo Provera in [place]. Please advise. Thank you.    Action Taken: Message routed to:  [facility] p [phone]    Travel Screening: Not Applicable

## 2022-09-14 NOTE — TELEPHONE ENCOUNTER
"Per 7/19 yearly physical appt with Dr. Hsieh:  \"She will think about contraception options.  She is contemplating the IUD or the Depo Provera.  She will let us know.\"    Pt did call in on 9/7 stating she wants to start on Depo Provera for birth control.  This does look like it has been ordered.    Pt was also asking if this is okay to start before her \"colposcopy\".  Pt is scheduled for a colposcopy with Dr. Hsieh on 10/21/22.    Spoke with Dr. Cho who states there are no contraindications for starting the Depo Provera prior to her colposcopy.    Yennifer Bauer RN    "

## 2022-09-15 ENCOUNTER — ALLIED HEALTH/NURSE VISIT (OUTPATIENT)
Dept: FAMILY MEDICINE | Facility: CLINIC | Age: 52
End: 2022-09-15
Payer: COMMERCIAL

## 2022-09-15 DIAGNOSIS — Z30.9 CONTRACEPTIVE MANAGEMENT: Primary | ICD-10-CM

## 2022-09-15 LAB — HCG UR QL: NEGATIVE

## 2022-09-15 PROCEDURE — 81025 URINE PREGNANCY TEST: CPT

## 2022-09-15 PROCEDURE — 96372 THER/PROPH/DIAG INJ SC/IM: CPT | Performed by: OBSTETRICS & GYNECOLOGY

## 2022-09-15 PROCEDURE — 99207 PR NO CHARGE NURSE ONLY: CPT

## 2022-09-15 RX ADMIN — MEDROXYPROGESTERONE ACETATE 150 MG: 150 INJECTION, SUSPENSION INTRAMUSCULAR at 10:57

## 2022-09-15 NOTE — PROGRESS NOTES
BP: Data Unavailable    LAST PAP/EXAM: No results found for: PAP  URINE HCG:negative    The following medication was given:     MEDICATION: Depo Provera 150mg  ROUTE: IM  SITE: Deltoid - Left  :   LOT #: [id number]  EXP:04/24  NEXT INJECTION DUE: 12/1/22 - 12/15/22   Provider:

## 2022-10-21 ENCOUNTER — OFFICE VISIT (OUTPATIENT)
Dept: OBGYN | Facility: CLINIC | Age: 52
End: 2022-10-21
Payer: COMMERCIAL

## 2022-10-21 VITALS
SYSTOLIC BLOOD PRESSURE: 135 MMHG | BODY MASS INDEX: 38.26 KG/M2 | OXYGEN SATURATION: 95 % | DIASTOLIC BLOOD PRESSURE: 85 MMHG | HEART RATE: 85 BPM | WEIGHT: 217 LBS

## 2022-10-21 DIAGNOSIS — R87.810 CERVICAL HIGH RISK HPV (HUMAN PAPILLOMAVIRUS) TEST POSITIVE: ICD-10-CM

## 2022-10-21 DIAGNOSIS — R87.612 PAPANICOLAOU SMEAR OF CERVIX WITH LOW GRADE SQUAMOUS INTRAEPITHELIAL LESION (LGSIL): Primary | ICD-10-CM

## 2022-10-21 PROCEDURE — 88305 TISSUE EXAM BY PATHOLOGIST: CPT | Performed by: PATHOLOGY

## 2022-10-21 PROCEDURE — 57454 BX/CURETT OF CERVIX W/SCOPE: CPT | Performed by: OBSTETRICS & GYNECOLOGY

## 2022-10-21 PROCEDURE — 88342 IMHCHEM/IMCYTCHM 1ST ANTB: CPT | Performed by: PATHOLOGY

## 2022-10-21 PROCEDURE — 99213 OFFICE O/P EST LOW 20 MIN: CPT | Mod: 25 | Performed by: OBSTETRICS & GYNECOLOGY

## 2022-10-21 RX ORDER — BISACODYL 5 MG
TABLET, DELAYED RELEASE (ENTERIC COATED) ORAL
Qty: 4 TABLET | Refills: 0 | Status: SHIPPED | OUTPATIENT
Start: 2022-10-21 | End: 2023-06-20

## 2022-10-21 NOTE — PROGRESS NOTES
Patient Name: Cheyenne Schulte              Date: 10/21/2022   YOB: 1970                         Age: 52 year old   Phone: [phone] (home)   ________________________________________________________________________  Janice presents today to discuss the pap smear, findings and possible further evaluation.  The patient's pap smear history is as noted:   2002, 2004, 2005, 2006 NIL paps  4/18/08 ASCUS, neg HPV  10/2/12 NIL  Above per Care Everywhere  Gap in care/records  7/19/22 LSIL, +HR HPV (not 16/18).     I attempted to ensure that the patient was educated regarding the nature of her findings and implications to date.  We reviewed the role of HPV, incidence in the population and the natural history of the infection, and its transmission.  We also reviewed ways to minimize her future risk, the effect of HPV on the cervix and treatment options available, should they be indicated.    The pathophysiology of the cervix, including a discussion of the squamous and columnar cells, metaplasia and dysplasia have been reviewed, drawings, sketches and the pamphlets were reviewed with her.      No LMP recorded. Patient has had an injection.  Current Birth Control Method: abstinence    Past Medical History:   Diagnosis Date     ADHD (attention deficit hyperactivity disorder)     sees psych      Family history of colon cancer      Intermittent asthma      Mild major depression (H)     sees psych        Past Surgical History:   Procedure Laterality Date     COLONOSCOPY N/A 07/13/2022    Procedure: COLONOSCOPY, FLEXIBLE, WITH LESION REMOVAL USING SNARE;  Surgeon: Shai Da Silva DO;  Location: [facility]     COLONOSCOPY N/A 07/13/2022    Procedure: COLONOSCOPY, WITH POLYPECTOMY AND BIOPSY;  Surgeon: Shai Da Silva DO;  Location: [facility]     COLONOSCOPY WITH CO2 INSUFFLATION N/A 07/13/2022    Procedure: COLONOSCOPY, WITH CO2 INSUFFLATION;  Surgeon: Shai Da Silva DO;  Location: [facility]     DILATION AND CURETTAGE      AB      GYN SURGERY      c section     HERNIA REPAIR      umbilical     ORTHOPEDIC SURGERY      ORIF right tibia        Outpatient Encounter Medications as of 10/21/2022   Medication Sig Dispense Refill     amphetamine-dextroamphetamine (ADDERALL XR) 30 MG 24 hr capsule Take 30 mg by mouth daily.       busPIRone HCl (BUSPAR) 30 MG tablet Take 1 tablet by mouth daily       venlafaxine (EFFEXOR XR) 150 MG 24 hr capsule Take 150 mg by mouth daily.       acetaminophen (TYLENOL) 650 MG CR tablet Take 1 tablet (650 mg) by mouth every 8 hours as needed for pain (Patient not taking: Reported on 6/7/2022) 60 tablet 1     albuterol (PROVENTIL HFA: VENTOLIN HFA) 108 (90 BASE) MCG/ACT inhaler Inhale 2 puffs into the lungs every 6 hours. (Patient not taking: Reported on 6/7/2022) 1 Inhaler 3     Facility-Administered Encounter Medications as of 10/21/2022   Medication Dose Route Frequency Provider Last Rate Last Admin     medroxyPROGESTERone (DEPO-PROVERA) injection 150 mg  150 mg Intramuscular Q90 Days Armando Hsieh MD   150 mg at 09/15/22 1057        Allergies as of 10/21/2022 - Reviewed 10/21/2022   Allergen Reaction Noted     Theophylline cr  01/30/2012     Zyban [bupropion hydrobromide]  01/30/2012     Bupropion Rash 06/30/2005       Social History     Socioeconomic History     Marital status:      Spouse name: None     Number of children: None     Years of education: None     Highest education level: None   Tobacco Use     Smoking status: Every Day     Packs/day: 1.00     Types: Cigarettes     Smokeless tobacco: Never   Substance and Sexual Activity     Alcohol use: Yes     Alcohol/week: 4.0 standard drinks     Types: 4 Cans of beer per week     Comment: 4 drinks a week     Drug use: No     Sexual activity: Yes     Partners: Male     Birth control/protection: Injection        Family History   Problem Relation Age of Onset     Arthritis Mother      Colon Cancer Mother      C.A.D. Father      Diabetes Father       Alcohol/Drug Father      Heart Disease Father          Review Of Systems  10 point ROS of systems including Constitutional, Eyes, Respiratory, Cardiovascular, Gastroenterology, Genitourinary, Integumentary, Muscularskeletal, Psychiatric were all negative except for pertinent positives noted in my HPI and in the PMH.      Exam:   /85 (BP Location: Left arm, Patient Position: Chair, Cuff Size: Adult Large)   Pulse 85   Wt 98.4 kg (217 lb)   SpO2 95%   Breastfeeding No   BMI 38.26 kg/m    GENERAL:  WNWD female NAD  HEENT: NC/AT, EOMI  Lungs:  Good respiratory effort   SKIN: normal skin turgor  GAIT: Normal  NECK: Symmetrical, no masses noted   VULVA: Normal Genitalia  BUS: Normal  URETHRA:  No hypermobility noted  URETHRAL MEATUS:  No masses noted  VAGINA: Normal mucosa, no discharge  CERVIX: Closed, mobile, no discharge  PERIANAL:  No masses or lesions seen  EXTREMITIES: no clubbing, cyanosis, or edema    Assessment:  LSIL pap smear  HR HPV of cervix, 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    Total time preparing to see patient with reviewing prior encounter and labs, face to face time, and coordinating care on the same calendar date: 20 minutes, in addition to the time for the procedure.      Armando Hsieh MD        Procedure:  Procedure for colposcopy and biopsy has been explained to the patient and consent obtained.    Before the procedure, it was ensured that the patient was educated regarding the nature of her findings and implications to date.  We reviewed the role of HPV and the natural history of the infection.  We also reviewed ways to minimize her future risk, the effect of HPV on the cervix and treatment options available, should they be indicated.    The pathophysiology of the cervix, including a discussion of the squamous and columnar cells, metaplasia and  dysplasia have been reviewed, drawings, sketches and the pamphlets were reviewed with her.  The details of the colposcopic procedure were reviewed, the risks of missed diagnoses, pain, infection, and bleeding.  Questions seemed to be answered before proceeding and the patient then consented to the procedure.     Speculum placed in vagina and excellent visualization of cervix achieved, cervix swabbed  with acetic acid solution.    biopsies taken (including ECC): 3   Hemostasis effected with Silver Nitrate.     Findings:    Cervix: acetowhitening noted at the 1-2 o'clock position  Vaginal inspection: no visible lesions.  Procedure Summary: Patient tolerated procedure well.      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  Will base further treatment on pathology findings.  Post biopsy instructions given to patient and call to discuss Pathology results.    Armando Hsieh MD

## 2022-10-21 NOTE — H&P (VIEW-ONLY)
Patient Name: Cheyenne Schulte              Date: 10/21/2022   YOB: 1970                         Age: 52 year old   Phone: [phone] (home)   ________________________________________________________________________  Janice presents today to discuss the pap smear, findings and possible further evaluation.  The patient's pap smear history is as noted:   2002, 2004, 2005, 2006 NIL paps  4/18/08 ASCUS, neg HPV  10/2/12 NIL  Above per Care Everywhere  Gap in care/records  7/19/22 LSIL, +HR HPV (not 16/18).     I attempted to ensure that the patient was educated regarding the nature of her findings and implications to date.  We reviewed the role of HPV, incidence in the population and the natural history of the infection, and its transmission.  We also reviewed ways to minimize her future risk, the effect of HPV on the cervix and treatment options available, should they be indicated.    The pathophysiology of the cervix, including a discussion of the squamous and columnar cells, metaplasia and dysplasia have been reviewed, drawings, sketches and the pamphlets were reviewed with her.      No LMP recorded. Patient has had an injection.  Current Birth Control Method: abstinence    Past Medical History:   Diagnosis Date     ADHD (attention deficit hyperactivity disorder)     sees psych      Family history of colon cancer      Intermittent asthma      Mild major depression (H)     sees psych        Past Surgical History:   Procedure Laterality Date     COLONOSCOPY N/A 07/13/2022    Procedure: COLONOSCOPY, FLEXIBLE, WITH LESION REMOVAL USING SNARE;  Surgeon: Shai Da Silva DO;  Location: [facility]     COLONOSCOPY N/A 07/13/2022    Procedure: COLONOSCOPY, WITH POLYPECTOMY AND BIOPSY;  Surgeon: Shai Da Silva DO;  Location: [facility]     COLONOSCOPY WITH CO2 INSUFFLATION N/A 07/13/2022    Procedure: COLONOSCOPY, WITH CO2 INSUFFLATION;  Surgeon: Shai Da Silva DO;  Location: [facility]     DILATION AND CURETTAGE      AB      GYN SURGERY      c section     HERNIA REPAIR      umbilical     ORTHOPEDIC SURGERY      ORIF right tibia        Outpatient Encounter Medications as of 10/21/2022   Medication Sig Dispense Refill     amphetamine-dextroamphetamine (ADDERALL XR) 30 MG 24 hr capsule Take 30 mg by mouth daily.       busPIRone HCl (BUSPAR) 30 MG tablet Take 1 tablet by mouth daily       venlafaxine (EFFEXOR XR) 150 MG 24 hr capsule Take 150 mg by mouth daily.       acetaminophen (TYLENOL) 650 MG CR tablet Take 1 tablet (650 mg) by mouth every 8 hours as needed for pain (Patient not taking: Reported on 6/7/2022) 60 tablet 1     albuterol (PROVENTIL HFA: VENTOLIN HFA) 108 (90 BASE) MCG/ACT inhaler Inhale 2 puffs into the lungs every 6 hours. (Patient not taking: Reported on 6/7/2022) 1 Inhaler 3     Facility-Administered Encounter Medications as of 10/21/2022   Medication Dose Route Frequency Provider Last Rate Last Admin     medroxyPROGESTERone (DEPO-PROVERA) injection 150 mg  150 mg Intramuscular Q90 Days Armando Hsieh MD   150 mg at 09/15/22 1057        Allergies as of 10/21/2022 - Reviewed 10/21/2022   Allergen Reaction Noted     Theophylline cr  01/30/2012     Zyban [bupropion hydrobromide]  01/30/2012     Bupropion Rash 06/30/2005       Social History     Socioeconomic History     Marital status:      Spouse name: None     Number of children: None     Years of education: None     Highest education level: None   Tobacco Use     Smoking status: Every Day     Packs/day: 1.00     Types: Cigarettes     Smokeless tobacco: Never   Substance and Sexual Activity     Alcohol use: Yes     Alcohol/week: 4.0 standard drinks     Types: 4 Cans of beer per week     Comment: 4 drinks a week     Drug use: No     Sexual activity: Yes     Partners: Male     Birth control/protection: Injection        Family History   Problem Relation Age of Onset     Arthritis Mother      Colon Cancer Mother      C.A.D. Father      Diabetes Father       Alcohol/Drug Father      Heart Disease Father          Review Of Systems  10 point ROS of systems including Constitutional, Eyes, Respiratory, Cardiovascular, Gastroenterology, Genitourinary, Integumentary, Muscularskeletal, Psychiatric were all negative except for pertinent positives noted in my HPI and in the PMH.      Exam:   /85 (BP Location: Left arm, Patient Position: Chair, Cuff Size: Adult Large)   Pulse 85   Wt 98.4 kg (217 lb)   SpO2 95%   Breastfeeding No   BMI 38.26 kg/m    GENERAL:  WNWD female NAD  HEENT: NC/AT, EOMI  Lungs:  Good respiratory effort   SKIN: normal skin turgor  GAIT: Normal  NECK: Symmetrical, no masses noted   VULVA: Normal Genitalia  BUS: Normal  URETHRA:  No hypermobility noted  URETHRAL MEATUS:  No masses noted  VAGINA: Normal mucosa, no discharge  CERVIX: Closed, mobile, no discharge  PERIANAL:  No masses or lesions seen  EXTREMITIES: no clubbing, cyanosis, or edema    Assessment:  LSIL pap smear  HR HPV of cervix, 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    Total time preparing to see patient with reviewing prior encounter and labs, face to face time, and coordinating care on the same calendar date: 20 minutes, in addition to the time for the procedure.      Armando Hsieh MD        Procedure:  Procedure for colposcopy and biopsy has been explained to the patient and consent obtained.    Before the procedure, it was ensured that the patient was educated regarding the nature of her findings and implications to date.  We reviewed the role of HPV and the natural history of the infection.  We also reviewed ways to minimize her future risk, the effect of HPV on the cervix and treatment options available, should they be indicated.    The pathophysiology of the cervix, including a discussion of the squamous and columnar cells, metaplasia and  dysplasia have been reviewed, drawings, sketches and the pamphlets were reviewed with her.  The details of the colposcopic procedure were reviewed, the risks of missed diagnoses, pain, infection, and bleeding.  Questions seemed to be answered before proceeding and the patient then consented to the procedure.     Speculum placed in vagina and excellent visualization of cervix achieved, cervix swabbed  with acetic acid solution.    biopsies taken (including ECC): 3   Hemostasis effected with Silver Nitrate.     Findings:    Cervix: acetowhitening noted at the 1-2 o'clock position  Vaginal inspection: no visible lesions.  Procedure Summary: Patient tolerated procedure well.      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  Will base further treatment on pathology findings.  Post biopsy instructions given to patient and call to discuss Pathology results.    Armando Hsieh MD

## 2022-10-21 NOTE — PATIENT INSTRUCTIONS
If you have any questions regarding your visit, Please contact your care team.    To Schedule an Appointment 24/7  Call: [phone]  [facility]  TELEPHONE NUMBER   Armando Hsieh M.D.    Thuy-Medical Assistant    Radha Esposito-Surgery Scheduler  Caitlyn-Surgery Scheduler Tuesday-[place]                   7:30 a.m.-3:30 p.m.  Wednesday-[place]             8:00 a.m.-4:30 p.m.  Thursday-[place]     8:00 a.m.-4:00 p.m.  Friday-[place]                       7:30 a.m.-1:00 p.m. [address] [place] [id number] [phone] Fax    Imaging Scheduling all locations  [phone]     [facility]  [address]  [place], [place] [address] [phone]    [facility]  [address] [place] [address] [phone] ask for Women's Clinic     **Surgeries** Our Surgery Schedulers will contact you to schedule. If you do not receive a call within 3 business days, please call [phone].    Urgent Care locations:  [facility] Monday-Friday                 10 am - 8 pm  Saturday and Sunday        9 am - 5 pm   [phone]   If you need a medication refill, please contact your pharmacy. Please allow 3 business days for your refill to be completed.  As always, Thank you for trusting us with your healthcare needs!  If you have any questions regarding your visit, Please contact your care team.    [facility]: [phone]    To Schedule an Appointment 24/7  Call: [phone]    see additional instructions from your care team below

## 2022-10-27 LAB
PATH REPORT.COMMENTS IMP SPEC: NORMAL
PATH REPORT.FINAL DX SPEC: NORMAL
PATH REPORT.GROSS SPEC: NORMAL
PATH REPORT.MICROSCOPIC SPEC OTHER STN: NORMAL
PATH REPORT.RELEVANT HX SPEC: NORMAL
PHOTO IMAGE: NORMAL

## 2022-10-28 ENCOUNTER — ANESTHESIA EVENT (OUTPATIENT)
Dept: SURGERY | Facility: AMBULATORY SURGERY CENTER | Age: 52
End: 2022-10-28
Payer: COMMERCIAL

## 2022-10-28 ENCOUNTER — ANESTHESIA (OUTPATIENT)
Dept: SURGERY | Facility: AMBULATORY SURGERY CENTER | Age: 52
End: 2022-10-28
Payer: COMMERCIAL

## 2022-10-28 ENCOUNTER — HOSPITAL ENCOUNTER (OUTPATIENT)
Facility: AMBULATORY SURGERY CENTER | Age: 52
Discharge: HOME | End: 2022-10-28
Attending: INTERNAL MEDICINE
Payer: COMMERCIAL

## 2022-10-28 VITALS
OXYGEN SATURATION: 99 % | HEART RATE: 78 BPM | TEMPERATURE: 97.4 F | SYSTOLIC BLOOD PRESSURE: 128 MMHG | DIASTOLIC BLOOD PRESSURE: 92 MMHG | RESPIRATION RATE: 16 BRPM

## 2022-10-28 VITALS — HEART RATE: 39 BPM

## 2022-10-28 DIAGNOSIS — Z80.0 FAMILY HISTORY OF COLON CANCER: ICD-10-CM

## 2022-10-28 DIAGNOSIS — Z12.11 COLON CANCER SCREENING: ICD-10-CM

## 2022-10-28 DIAGNOSIS — Z86.0100 HISTORY OF COLON POLYPS: Primary | ICD-10-CM

## 2022-10-28 LAB — COLONOSCOPY: NORMAL

## 2022-10-28 PROCEDURE — G8907 PT DOC NO EVENTS ON DISCHARG: HCPCS

## 2022-10-28 PROCEDURE — G8918 PT W/O PREOP ORDER IV AB PRO: HCPCS

## 2022-10-28 PROCEDURE — 88305 TISSUE EXAM BY PATHOLOGIST: CPT | Performed by: PATHOLOGY

## 2022-10-28 PROCEDURE — 45380 COLONOSCOPY AND BIOPSY: CPT | Mod: XS

## 2022-10-28 PROCEDURE — 45385 COLONOSCOPY W/LESION REMOVAL: CPT

## 2022-10-28 RX ORDER — NALOXONE HYDROCHLORIDE 0.4 MG/ML
0.4 INJECTION, SOLUTION INTRAMUSCULAR; INTRAVENOUS; SUBCUTANEOUS
Status: DISCONTINUED | OUTPATIENT
Start: 2022-10-28 | End: 2022-11-01 | Stop reason: HOSPADM

## 2022-10-28 RX ORDER — NALOXONE HYDROCHLORIDE 0.4 MG/ML
0.2 INJECTION, SOLUTION INTRAMUSCULAR; INTRAVENOUS; SUBCUTANEOUS
Status: DISCONTINUED | OUTPATIENT
Start: 2022-10-28 | End: 2022-11-01 | Stop reason: HOSPADM

## 2022-10-28 RX ORDER — PROCHLORPERAZINE MALEATE 10 MG
10 TABLET ORAL EVERY 6 HOURS PRN
Status: DISCONTINUED | OUTPATIENT
Start: 2022-10-28 | End: 2022-11-01 | Stop reason: HOSPADM

## 2022-10-28 RX ORDER — ONDANSETRON 2 MG/ML
4 INJECTION INTRAMUSCULAR; INTRAVENOUS
Status: DISCONTINUED | OUTPATIENT
Start: 2022-10-28 | End: 2022-11-01 | Stop reason: HOSPADM

## 2022-10-28 RX ORDER — ONDANSETRON 4 MG/1
4 TABLET, ORALLY DISINTEGRATING ORAL EVERY 6 HOURS PRN
Status: DISCONTINUED | OUTPATIENT
Start: 2022-10-28 | End: 2022-11-01 | Stop reason: HOSPADM

## 2022-10-28 RX ORDER — PROPOFOL 10 MG/ML
INJECTION, EMULSION INTRAVENOUS CONTINUOUS PRN
Status: DISCONTINUED | OUTPATIENT
Start: 2022-10-28 | End: 2022-10-28

## 2022-10-28 RX ORDER — ONDANSETRON 2 MG/ML
4 INJECTION INTRAMUSCULAR; INTRAVENOUS EVERY 6 HOURS PRN
Status: DISCONTINUED | OUTPATIENT
Start: 2022-10-28 | End: 2022-11-01 | Stop reason: HOSPADM

## 2022-10-28 RX ORDER — LIDOCAINE 40 MG/G
CREAM TOPICAL
Status: DISCONTINUED | OUTPATIENT
Start: 2022-10-28 | End: 2022-11-01 | Stop reason: HOSPADM

## 2022-10-28 RX ORDER — FLUMAZENIL 0.1 MG/ML
0.2 INJECTION, SOLUTION INTRAVENOUS
Status: ACTIVE | OUTPATIENT
Start: 2022-10-28 | End: 2022-10-28

## 2022-10-28 RX ORDER — SODIUM CHLORIDE, SODIUM LACTATE, POTASSIUM CHLORIDE, CALCIUM CHLORIDE 600; 310; 30; 20 MG/100ML; MG/100ML; MG/100ML; MG/100ML
INJECTION, SOLUTION INTRAVENOUS CONTINUOUS
Status: DISCONTINUED | OUTPATIENT
Start: 2022-10-28 | End: 2022-11-01 | Stop reason: HOSPADM

## 2022-10-28 RX ORDER — LIDOCAINE HYDROCHLORIDE 20 MG/ML
INJECTION, SOLUTION INFILTRATION; PERINEURAL PRN
Status: DISCONTINUED | OUTPATIENT
Start: 2022-10-28 | End: 2022-10-28

## 2022-10-28 RX ORDER — PROPOFOL 10 MG/ML
INJECTION, EMULSION INTRAVENOUS PRN
Status: DISCONTINUED | OUTPATIENT
Start: 2022-10-28 | End: 2022-10-28

## 2022-10-28 RX ADMIN — SODIUM CHLORIDE, SODIUM LACTATE, POTASSIUM CHLORIDE, CALCIUM CHLORIDE: 600; 310; 30; 20 INJECTION, SOLUTION INTRAVENOUS at 06:36

## 2022-10-28 RX ADMIN — LIDOCAINE HYDROCHLORIDE 60 MG: 20 INJECTION, SOLUTION INFILTRATION; PERINEURAL at 07:00

## 2022-10-28 RX ADMIN — PROPOFOL 100 MG: 10 INJECTION, EMULSION INTRAVENOUS at 07:00

## 2022-10-28 RX ADMIN — PROPOFOL 150 MCG/KG/MIN: 10 INJECTION, EMULSION INTRAVENOUS at 07:00

## 2022-10-28 NOTE — ANESTHESIA POSTPROCEDURE EVALUATION
Patient: Cheyenne P Franca    Procedure: Procedure(s):  COLONOSCOPY, WITH CO2 INSUFFLATION  COLONOSCOPY, FLEXIBLE, WITH LESION REMOVAL USING SNARE  COLONOSCOPY, WITH POLYPECTOMY AND BIOPSY       Anesthesia Type:  MAC    Note:  Disposition: Outpatient   Postop Pain Control: Uneventful            Sign Out: Well controlled pain   PONV: No   Neuro/Psych: Uneventful            Sign Out: Acceptable/Baseline neuro status   Airway/Respiratory: Uneventful            Sign Out: Acceptable/Baseline resp. status   CV/Hemodynamics: Uneventful            Sign Out: Acceptable CV status; No obvious hypovolemia; No obvious fluid overload   Other NRE: NONE   DID A NON-ROUTINE EVENT OCCUR? No           Last vitals:  Vitals Value Taken Time   /92 10/28/22 0826   Temp     Pulse 78 10/28/22 0826   Resp 16 10/28/22 0755   SpO2 99 % 10/28/22 0826       Electronically Signed By: Jonnie Samuel MD  October 28, 2022  10:51 AM

## 2022-10-28 NOTE — ANESTHESIA PREPROCEDURE EVALUATION
Anesthesia Pre-Procedure Evaluation    Patient: Cheyenne Schulte   MRN: [id number] : 1970        Procedure : Procedure(s):  COLONOSCOPY, WITH CO2 INSUFFLATION          Past Medical History:   Diagnosis Date     ADHD (attention deficit hyperactivity disorder)     sees psych      Family history of colon cancer      Intermittent asthma      Mild major depression (H)     sees psych       Past Surgical History:   Procedure Laterality Date     COLONOSCOPY N/A 2022    Procedure: COLONOSCOPY, FLEXIBLE, WITH LESION REMOVAL USING SNARE;  Surgeon: Shai Da Silva DO;  Location: [facility]     COLONOSCOPY N/A 2022    Procedure: COLONOSCOPY, WITH POLYPECTOMY AND BIOPSY;  Surgeon: Shai Da Silva DO;  Location: [facility]     COLONOSCOPY WITH CO2 INSUFFLATION N/A 2022    Procedure: COLONOSCOPY, WITH CO2 INSUFFLATION;  Surgeon: Shai Da Silva DO;  Location: [facility]     DILATION AND CURETTAGE      AB     GYN SURGERY      c section     HERNIA REPAIR      umbilical     ORTHOPEDIC SURGERY      ORIF right tibia      Allergies   Allergen Reactions     Theophylline Cr      Zyban [Bupropion Hydrobromide]      Bupropion Rash      Social History     Tobacco Use     Smoking status: Every Day     Packs/day: 1.00     Types: Cigarettes     Smokeless tobacco: Never   Substance Use Topics     Alcohol use: Yes     Alcohol/week: 4.0 standard drinks     Types: 4 Cans of beer per week     Comment: 4 drinks a week      Wt Readings from Last 1 Encounters:   10/21/22 98.4 kg (217 lb)        Anesthesia Evaluation   Pt has had prior anesthetic. Type: General and MAC.    No history of anesthetic complications       ROS/MED HX  ENT/Pulmonary:     (+) Intermittent, asthma     Neurologic:  - neg neurologic ROS     Cardiovascular:  - neg cardiovascular ROS     METS/Exercise Tolerance:     Hematologic:  - neg hematologic  ROS     Musculoskeletal:  - neg musculoskeletal ROS     GI/Hepatic:  - neg GI/hepatic ROS     Renal/Genitourinary:  - neg  Renal ROS     Endo:  - neg endo ROS     Psychiatric/Substance Use: Comment: ADHD    (+) psychiatric history depression     Infectious Disease:  - neg infectious disease ROS     Malignancy:  - neg malignancy ROS     Other:            Physical Exam    Airway  airway exam normal           Respiratory Devices and Support         Dental  no notable dental history         Cardiovascular   cardiovascular exam normal          Pulmonary   pulmonary exam normal                OUTSIDE LABS:  CBC: No results found for: WBC, HGB, HCT, PLT  BMP:   Lab Results   Component Value Date     06/07/2022    POTASSIUM 4.7 06/07/2022    CHLORIDE 109 06/07/2022    CO2 29 06/07/2022    BUN 14 06/07/2022    CR 0.82 06/07/2022     (H) 06/07/2022     COAGS: No results found for: PTT, INR, FIBR  POC:   Lab Results   Component Value Date    HCG Negative 09/15/2022     HEPATIC:   Lab Results   Component Value Date    ALBUMIN 3.8 06/07/2022    PROTTOTAL 6.8 06/07/2022    ALT 22 06/07/2022    AST 4 06/07/2022    ALKPHOS 75 06/07/2022    BILITOTAL 0.4 06/07/2022     OTHER:   Lab Results   Component Value Date    A1C 5.6 06/07/2022    DOC 8.9 06/07/2022       Anesthesia Plan    ASA Status:  2   NPO Status:  NPO Appropriate    Anesthesia Type: MAC.   Induction: Intravenous, Propofol.   Maintenance: TIVA.        Consents    Anesthesia Plan(s) and associated risks, benefits, and realistic alternatives discussed. Questions answered and patient/representative(s) expressed understanding.    - Discussed:     - Discussed with:  Patient      - Extended Intubation/Ventilatory Support Discussed: No.      - Patient is DNR/DNI Status: No    Use of blood products discussed: No .     Postoperative Care    Post procedure pain management: None required.   PONV prophylaxis: Ondansetron (or other 5HT-3), Background Propofol Infusion     Comments:                Jonnie Samuel MD

## 2022-10-28 NOTE — ANESTHESIA CARE TRANSFER NOTE
Patient: Cheyenne Schulte    Procedure: Procedure(s):  COLONOSCOPY, WITH CO2 INSUFFLATION  COLONOSCOPY, FLEXIBLE, WITH LESION REMOVAL USING SNARE  COLONOSCOPY, WITH POLYPECTOMY AND BIOPSY       Diagnosis: Special screening for malignant neoplasms, colon [Z12.11]  Polyp of colon, unspecified part of colon, unspecified type [K63.5]  Diagnosis Additional Information: No value filed.    Anesthesia Type:   MAC     Note:    Oropharynx: oropharynx clear of all foreign objects  Level of Consciousness: drowsy  Oxygen Supplementation: room air    Independent Airway: airway patency satisfactory and stable  Dentition: dentition unchanged  Vital Signs Stable: post-procedure vital signs reviewed and stable  Report to RN Given: handoff report given  Patient transferred to: Phase II    Handoff Report: Identifed the Patient, Identified the Reponsible Provider, Reviewed the pertinent medical history, Discussed the surgical course, Reviewed Intra-OP anesthesia mangement and issues during anesthesia, Set expectations for post-procedure period and Allowed opportunity for questions and acknowledgement of understanding      Vitals:  Vitals Value Taken Time   BP     Temp     Pulse     Resp     SpO2         Electronically Signed By: ALTHEA Terrell CRNA  October 28, 2022  7:56 AM

## 2022-10-28 NOTE — H&P
[facility] Anesthesia Pre-op History and Physical    Cheyenne CAYDEN Schulte MRN# [id number]   Age: 52 year old YOB: 1970            Date of Exam 10/28/2022         Primary care provider: [facility]         Chief Complaint and/or Reason for Procedure:     History of colon polyps - 20 polyps removed on colonoscopy 3 months ago (TVAs and tubular adenomas).  Family history of CRC         Active problem list:     Patient Active Problem List    Diagnosis Date Noted     Cervical high risk HPV (human papillomavirus) test positive 07/19/2022     Priority: Medium     Papanicolaou smear of cervix with low grade squamous intraepithelial lesion (LGSIL) 07/19/2022     Priority: Medium     2002, 2004, 2005, 2006 NIL paps  4/18/08 ASCUS, neg HPV  10/2/12 NIL  Above per Care Everywhere  Gap in care/records  7/19/22 LSIL, +HR HPV (not 16/18). Plan: colposcopy due before 10/19/22  7/27/22 Pt notified, transferred to scheduling  10/21/22 [place] appt       Intermittent asthma      Priority: Medium            Medications (include herbals and vitamins):   Any Plavix use in the last 7 days? No     Current Outpatient Medications   Medication Sig     amphetamine-dextroamphetamine (ADDERALL XR) 30 MG 24 hr capsule Take 30 mg by mouth daily.     bisacodyl (DULCOLAX) 5 MG EC tablet Take 2 tablets at 3 pm the day before your procedure. If your procedure is before 11 am, take 2 additional tablets at 11 pm. If your procedure is after 11 am, take 2 additional tablets at 6 am. For additional instructions refer to your colonoscopy prep instructions.     busPIRone HCl (BUSPAR) 30 MG tablet Take 1 tablet by mouth daily     polyethylene glycol (GOLYTELY) 236 g suspension The night before the exam at 6 pm drink an 8-ounce glass every 15 minutes until the jug is half empty. If you arrive before 11 AM: Drink the other half of the [employer] jug at 11 PM night before procedure. If you arrive after 11 AM: Drink the other  half of the [employer] jug at 6 AM day of procedure. For additional instructions refer to your colonoscopy prep instructions.     venlafaxine (EFFEXOR XR) 150 MG 24 hr capsule Take 150 mg by mouth daily.     acetaminophen (TYLENOL) 650 MG CR tablet Take 1 tablet (650 mg) by mouth every 8 hours as needed for pain (Patient not taking: Reported on 6/7/2022)     albuterol (PROVENTIL HFA: VENTOLIN HFA) 108 (90 BASE) MCG/ACT inhaler Inhale 2 puffs into the lungs every 6 hours. (Patient not taking: Reported on 6/7/2022)     Current Facility-Administered Medications   Medication     lactated ringers infusion     lidocaine (LMX4) kit     lidocaine 1 % 0.1-1 mL     medroxyPROGESTERone (DEPO-PROVERA) injection 150 mg     ondansetron (ZOFRAN) injection 4 mg     sodium chloride (PF) 0.9% PF flush 3 mL     sodium chloride (PF) 0.9% PF flush 3 mL             Allergies:      Allergies   Allergen Reactions     Theophylline Cr      Zyban [Bupropion Hydrobromide]      Bupropion Rash     Allergy to Latex? No  Allergy to tape?   No  Intolerances:             Physical Exam:   All vitals have been reviewed  Patient Vitals for the past 8 hrs:   BP Temp Temp src Resp SpO2   10/28/22 0628 (!) 141/74 97.4  F (36.3  C) Temporal 16 96 %     No intake/output data recorded.  Lungs:   No increased work of breathing, good air exchange, clear to auscultation bilaterally, no crackles or wheezing     Cardiovascular:   normal S1 and S2             Lab / Radiology Results:            Anesthetic risk and/or ASA classification:   1    Jennifer Ash DO

## 2022-11-07 ENCOUNTER — PATIENT OUTREACH (OUTPATIENT)
Dept: OBGYN | Facility: CLINIC | Age: 52
End: 2022-11-07

## 2022-12-12 ENCOUNTER — ALLIED HEALTH/NURSE VISIT (OUTPATIENT)
Dept: FAMILY MEDICINE | Facility: CLINIC | Age: 52
End: 2022-12-12
Payer: COMMERCIAL

## 2022-12-12 DIAGNOSIS — Z30.9 CONTRACEPTIVE MANAGEMENT: Primary | ICD-10-CM

## 2022-12-12 PROCEDURE — 99207 PR NO CHARGE NURSE ONLY: CPT

## 2022-12-12 PROCEDURE — 96372 THER/PROPH/DIAG INJ SC/IM: CPT | Performed by: FAMILY MEDICINE

## 2022-12-12 RX ADMIN — MEDROXYPROGESTERONE ACETATE 150 MG: 150 INJECTION, SUSPENSION INTRAMUSCULAR at 16:25

## 2022-12-12 NOTE — PROGRESS NOTES
Clinic Administered Medication Documentation    Administrations This Visit     medroxyPROGESTERone (DEPO-PROVERA) injection 150 mg     Admin Date  12/12/2022 Action  Given Dose  150 mg Route  Intramuscular Site  Left Deltoid Administered By  Zoë Dawson CMA    Ordering Provider: Armando Hsieh MD    NDC: 29316-828-80    Lot#: [id number]    : MYLAN [facility]    Patient Supplied?: No                  Depo Provera Documentation    URINE HCG: not indicated    Depo-Provera Standing Order inclusion/exclusion criteria reviewed.   Patient meets: inclusion criteria     BP: Data Unavailable  LAST PAP/EXAM: No results found for: PAP    Prior to injection, verified patient identity using patient's name and date of birth. Medication was administered. Please see MAR and medication order for additional information.     Was entire vial of medication used? Yes  Vial/Syringe: Single dose vial  Expiration Date:  04/2024    Patient instructed to remain in clinic for 15 minutes and report any adverse reaction to staff immediately .  NEXT INJECTION DUE: 2/27/23 - 3/13/23    Zoë Dawson MA

## 2023-01-20 ENCOUNTER — TELEPHONE (OUTPATIENT)
Dept: GASTROENTEROLOGY | Facility: CLINIC | Age: 53
End: 2023-01-20
Payer: COMMERCIAL

## 2023-01-20 ENCOUNTER — HOSPITAL ENCOUNTER (OUTPATIENT)
Facility: AMBULATORY SURGERY CENTER | Age: 53
End: 2023-01-20
Attending: INTERNAL MEDICINE
Payer: COMMERCIAL

## 2023-01-20 NOTE — TELEPHONE ENCOUNTER
"    Screening Questions  BLUE  KIND OF PREP RED  LOCATION [review exclusion criteria] GREEN  SEDATION TYPE        Yes Are you active on mychart?       [employer] Ordering/Referring Provider?        HP What type of coverage do you have?      N Have you had a positive covid test in the last 14 days?     36.9 1. BMI  [BMI 40+ - review exclusion criteria]    Yes  2. Are you able to give consent for your medical care? [IF NO,RN REVIEW]          N  3. Are you taking any prescription pain medications on a routine schedule   (ex narcotics: tramadol, oxycodone, roxicodone, oxycontin,  and percocet)?        NA  3a. EXTENDED PREP What kind of prescription?     N 4. Do you have any chemical dependencies such as alcohol, street drugs, or methadone?        **If yes 3- 5 , please schedule with MAC sedation.**          IF YES TO ANY 6 - 10 - HOSPITAL SETTING ONLY.     N 6.   Do you need assistance transferring?     N 7.   Have you had a heart or lung transplant?    N 8.   Are you currently on dialysis?   N 9.   Do you use daily home oxygen?   N 10. Do you take nitroglycerin?   10a. NA If yes, how often?     11. [FEMALES]  NA Are you currently pregnant?    11a. NA If yes, how many weeks? [ Greater than 12 weeks, OR NEEDED]    N 12. Do you have Pulmonary Hypertension? *NEED PAC APPT AT [facility]*     N 13. [review exclusion criteria]  Do you have any implantable devices in your body (pacemaker, defib, LVAD)?    N 14. In the past 6 months, have you had any heart related issues including cardiomyopathy or heart attack?     14a. N If yes, did it require cardiac stenting if so when?     N 15. Have you had a stroke or Transient ischemic attack (TIA - aka  mini stroke ) within 6 months?      N 16. Do you have mod to severe Obstructive Sleep Apnea?  [Hospital only]    N 17. Do you have SEVERE AND UNCONTROLLED asthma? *NEED PAC APPT AT [facility]*     N 18. Are you currently taking any blood thinners?     18a. If yes, inform patient to \"follow up w/ " "ordering provider for bridging instructions.\"    N 19. Do you take the medication Phentermine?    19a. If yes, \"Hold for 7 days before procedure.  Please consult your prescribing provider if you have questions about holding this medication.\"     N  20. Do you have chronic kidney disease?      N  21. Do you have a diagnosis of diabetes?     N  22. On a regular basis do you go 3-5 days between bowel movements?      23. Preferred LOCAL Pharmacy for Pre Prescription    [ LIST ONLY ONE PHARMACY]     [id number] [place], [place] - [address]        - CLOSING REMINDERS -    Informed patient they will need an adult    Cannot take any type of public or medical transportation alone    Conscious Sedation- Needs  for 6 hours after the procedure       MAC/General-Needs  for 24 hours after procedure    Pre-Procedure Covid test to be completed [[facility] PCR Testing Required]    Confirmed Nurse will call to complete assessment       - SCHEDULING DETAILS -  N Hospital Setting Required? If yes, what is the exclusion?: AMBERLY Ash  Surgeon    4/14/23  Date of Procedure  Lower Endoscopy [Colonoscopy]  Type of Procedure Scheduled  [facility]-If you answer yes to questions #8, #20, #21Which Colonoscopy Prep was Sent?     MAC per order Sedation Type     No PAC / Pre-op Required                 "

## 2023-01-23 NOTE — PROGRESS NOTES
Janice is a 52 year old who is being evaluated via a billable video visit.    How would you like to obtain your AVS? MyChart  If the video visit is dropped, the invitation should be resent by: Text to cell phone: [phone]  Will anyone else be joining your video visit? No      Video-Visit Details  Type of service:  Video Visit   Originating Location (pt. Location): Home  Distant Location (provider location):  Off-site  Platform used for Video Visit: [employer]     1/24/2023    Referring Provider: Jennifer Ash DO    Presenting Information:   I met with Cheyenne Schulte today for her video genetic counseling visit through the [facility] to discuss her personal history of colon polyps and family history of colon, pancreatic, brain, and lung cancer. She is here today to review this history, cancer screening recommendations, and available genetic testing options.    Personal History:  Cheyenne is a 52 year old female. She does not have any personal history of cancer. In her hormonal-based medical history, she had her first menstrual period at age 14, her first child at age 33, and premenopausal. Cheyenne has her ovaries, fallopian tubes and uterus in place, and reports no ovarian cancer screening to date. She reports no history of hormone replacement therapy. Cheyenne reports oral contraceptive use for approximately 10 years.       Cheyenne has regular clinical breast exams; her most recent mammogram in June 2022 was normal. Cheyenne began having colonoscopies at the age of 51. Her first colonoscopy in July 2022 found nineteen 3-15 mm polyps in the sigmoid colon (pathology includes fragments of tubulovillous and tubular adenomas, sessile serrated adenomas, and hyperplastic polyps) and one 5 mm fragments of tubulovillous and tubular adenoma in the ascending colon. Follow-up was recommended in 3 months. Her most recent colonoscopy in October 2022 found two 4-8 mm tubular adenomas at the hepatic flexure, three  3-6 mm and one 2 mm sessile serrated adenomas and fragments of hyperplastic polyps in the descending colon, twenty 3-8 mm fragments of sessile serrated adenomas and hyperplastic polyps in the rectum and sigmoid colon. Follow-up was recommended in 6 months. Cheyenne denies any history of dermatological exams. She does not regularly do any other cancer screening at this time. Candida reported smoking for approximately 30 years and occasional alcohol use.    Family History: (Please see scanned pedigree for detailed family history information)    Cheyenne's mother was diagnosed with colon cancer at age 64. It was reported that she had between 10-19 colon polyps. She passed away at age 70.    Maternal grandmother was diagnosed with pancreatic cancer at age 80. She passed away at age 85.    Paternal aunt was diagnosed with brain cancer at age 69. She is currently 70.    Paternal grandmother was diagnosed with lung cancer at age 84. It was reported that she was a smoker. She passed away at age 86.     Paternal grandfather was diagnosed with leukemia at age 50. He passed away at age 72.     Her maternal ethnicity is English, Amharic, and [place]. Her paternal ethnicity is English, Amharic, and Pakistani.  There is no known Ashkenazi Yarsanism ancestry on either side of her family. There is no reported consanguinity.    Discussion:  Cheyenne's personal history of colon polyps and family history of colon and pancreatic cancer is suggestive of a hereditary cancer syndrome.  We reviewed the features of sporadic, familial, and hereditary cancers. We discussed the natural history and genetics of several hereditary cancer syndromes, including MUTYH-Associated Polyposis (MAP) and Bautista syndrome:  MUTYH-Associated Polyposis (MAP) is caused by mutations in the MUTYH gene. Individuals with MAP typically have 10 to more than 100 adenoma type polyps in the colon and the gastrointestinal tract, as well as increased colon and duodenal cancer risk.  Everyone has two copies of the MUTYH gene. In order to be affected with MAP, a person must have a mutation in both copies of the gene. Carriers of MAP (people who only have one mutation in one copy of the MUTYH gene) may have a very slightly increased risk of colon cancer.   Bautista syndrome can be caused by a mutation in one of five genes:  MLH1, MSH2, MSH6, PMS2, and EPCAM. A single mutation in one of the Bautista Syndrome genes increases the risk for several cancers, such as colon cancer, endometrial/uterine cancer, gastric cancer, and ovarian cancer. Other cancers have also been reported in some families with Bautista Syndrome include pancreatic, bladder, biliary tract, urothelial, small bowel, prostate, breast and brain cancers.  A detailed handout regarding information about MAP, Bautista syndrome, and related hereditary cancer syndromes will be provided to Candida via [employer] and can be found in the after visit summary. Topics included: inheritance pattern, cancer risks, cancer screening recommendations, and also risks, benefits and limitations of testing.  In looking at Cheyenne's personal and family history, it is possible that a cancer susceptibility gene is present due to her personal history of numerous colon polyps and her mother diagnosed with colon cancer at age 64 and was reported to have between 10-19 colon polyps.   Candida has had a total of 46 colon polyps. These polyps have had a combination of pathology including: tubular adenoma, sessile serrated adenoma, and hyperplastic. Based on pathology reports from her colonoscopies in July 2022 and  October 2022, it is unclear the exact number of polyps that correlate to each pathology type. Candida was found to have at least three tubular adenomas, however, this number is likely higher. Therefore, her risk for a hereditary cancer syndrome associated with colon polyposis and colon cancer may be underestimated. If Candida has had 10 or more adenomatous colon polyps,  she would meet the genetic testing criteria for consideration of genetic testing for colon polyposis syndromes (APC and MUTYH).  Additionally, Candida mother's diagnosis of colon cancer at age 64 and was reported to have numerous colon polyps. Her mother's history may be suspicious for a hereditary cancer syndrome. The pathology of her colon polyps are unknown. If she had greater than 10 adenomatous colon polyps, then Cheyenne's mother would have meet the genetic testing criteria of colon polyposis syndromes (APC and MUTYH). Since this information is unknown, it could underestimate Candida's risk for hereditary cancer syndrome. Therefore, it would be reasonable for Candida to pursue genetic testing to better understand her risk of a hereditary cancer syndrome and chance of developing cancer.  In looking at Candida's personal and family history, it is possible that a cancer susceptibility gene is present due to her mother diagnosed with colon cancer at age 64 and her maternal grandmother diagnosed with pancreatic cancer at age 80.   Since Candida only has two close relatives diagnosed with Bautista related cancers, she does not meet current National Comprehensive Cancer Network (NCCN) criteria for genetic testing of Bautista syndrome genes. However, Candida only needs one additional relative to be diagnosed with a Bautista related cancer in order meet NCCN criteria. Therefore, it would be reasonable for Cheyenne to purse testing to better understand her risk of a hereditary cancer syndrome and risk of developing cancer.  We discussed that there are additional genes that could cause increased risk for adenomatous polyposis  colon cancer. As many of these genes present with overlapping features in a family and accurate cancer risk cannot always be established based upon the pedigree analysis alone, it would be reasonable for Candida to consider panel genetic testing to analyze multiple genes at once.  We reviewed genetic testing  options for Cheyenne based on her personal and family history: a panel of genes associated with an increased risk for polyposis, colon cancer, and pancreatic cancer, or larger panel options to include genes associated with increased risk for multiple different cancer types. Candida expressed interest in the APC and MUTYH genes with automatic reflex to the Common Hereditary Cancers panel through [facility].   Genetic testing is available for 47 genes associated with cancers of the breast, ovary, uterus, prostate and gastrointestinal system: [facility] Common Hereditary Cancers panel (APC, SHAHANA, AXIN2, BARD1, BMPR1A, BRCA1, BRCA2, BRIP1, CDH1, CDK4, CDKN2A, CHEK2, CTNNA1, DICER1, EPCAM, GREM1, HOXB13, KIT, MEN1, MLH1, MSH2, MSH3, MSH6, MUTYH, NBN, NF1, NTHL1, PALB2, PDGFRA, PMS2, POLD1, POLE, PTEN,RAD50, RAD51C, RAD51D, SDHA, SDHB, SDHC, SDHD, SMAD4, SMARCA4, STK11, TP53,TSC1, TSC2, VHL).    We discussed that many of these genes are associated with specific hereditary cancer syndromes and published management guidelines: Hereditary Breast and Ovarian Cancer syndrome (BRCA1, BRCA2), Bautista syndrome (MLH1, MSH2, MSH6, PMS2, EPCAM), Familial Adenomatous Polyposis (APC), Hereditary Diffuse Gastric Cancer (CDH1), Familial Atypical Multiple Mole Melanoma syndrome (CDK4, CDKN2A), Multiple Endocrine Neoplasia type 1 (MEN1), Juvenile Polyposis syndrome (BMPR1A, SMAD4), Cowden syndrome (PTEN), Li Fraumeni syndrome (TP53), Hereditary Paraganglioma and Pheochromocytoma syndrome (SDHA, SDHB, SDHC, SDHD), Peutz-Jeghers syndrome (STK11), MUTYH Associated Polyposis (MUTYH), Tuberous sclerosis complex (TSC1, TSC2), Von Hippel-Lindau disease (VHL), and Neurofibromatosis type 1 (NF1).   The SHAHANA, AXIN2, BRIP1, CHEK2, GREM1, MSH3, NBN, NTHL1, PALB2, POLD1, POLE, RAD51C, and RAD51D genes are associated with increased cancer risk and have published management guidelines for certain cancers.   The remaining genes (BARD1, CTNNA1, DICER1,  HOXB13, KIT, PDGFRA, RAD50, and SMARCA4) are associated with increased cancer risk and may allow us to make medical recommendations when mutations are identified.   Cheyenne would like to submit a blood sample for her genetic testing. She will go to her nearest [facility] at her earliest convenience to get her blood drawn for her genetic testing.   Verbal consent was given over video and written on the consent form. Turnaround time is approximately 3-4 weeks once the lab receives the sample.    Medical Management: For Candida, we reviewed that the information from genetic testing may determine:    additional cancer screening for which Candida may qualify (i.e. mammogram and breast MRI, more frequent colonoscopies, more frequent dermatologic exams, etc.),    options for risk reducing surgeries Candida could consider (i.e. bilateral mastectomy, surgery to remove her ovaries and/or uterus, etc.),      and targeted chemotherapies if she were to develop certain cancers in the future (i.e. immunotherapy for individuals with Bautista syndrome, PARP inhibitors, etc.).     These recommendations and possible targeted chemotherapies will be discussed in detail once genetic testing is completed.     Plan:  1) Today Cheyenne elected to proceed with the APC and MUTYH genes with automatic reflex to the Common Hereditary Cancers panel through [facility]. Therefore, consent was reviewed and verbally obtained for this testing.   2) Cheyenne plans to schedule her blood draw appointment at a clinic that is convenient to her.   3) The results should be available in 3-4 weeks after her blood is drawn.  4) Candida will either have a telephone visit or video visit to discuss the results.  Additional recommendations about screening will be made at that time.    Candida is 52 year old and is being evaluated via a billable video visit.    Time spent on video: 29 minutes    Tanika Biswas MS, [facility] [place]  Licensed, Certified Genetic  Counselor  Phone: [phone]

## 2023-01-24 ENCOUNTER — VIRTUAL VISIT (OUTPATIENT)
Dept: ONCOLOGY | Facility: CLINIC | Age: 53
End: 2023-01-24
Attending: INTERNAL MEDICINE
Payer: COMMERCIAL

## 2023-01-24 DIAGNOSIS — Z80.8 FAMILY HISTORY OF BRAIN CANCER: ICD-10-CM

## 2023-01-24 DIAGNOSIS — Z80.0 FAMILY HISTORY OF PANCREATIC CANCER: ICD-10-CM

## 2023-01-24 DIAGNOSIS — Z80.0 FAMILY HISTORY OF COLON CANCER: Primary | ICD-10-CM

## 2023-01-24 DIAGNOSIS — Z80.1 FAMILY HISTORY OF LUNG CANCER: ICD-10-CM

## 2023-01-24 DIAGNOSIS — Z86.0100 HISTORY OF COLON POLYPS: ICD-10-CM

## 2023-01-24 DIAGNOSIS — Z80.6 FAMILY HISTORY OF LEUKEMIA: ICD-10-CM

## 2023-01-24 PROCEDURE — 96040 HC GENETIC COUNSELING, EACH 30 MINUTES: CPT | Mod: GT,95

## 2023-01-24 NOTE — LETTER
Cancer Risk Management  Program Locations    [facility]  [facility]  Mailing Address  Cancer Risk Management Program  [facility]  [address]  [place] [address]    New patient appointments  [phone]  January 24, 2023    Cheyenne Schulte  [address]   [facility] [address]    Dear Candida,    It was a pleasure talking with you via your virtual genetic counseling visit on 1/24/2023.  Below is a copy of the progress note from that recent visit through the Cancer Risk Management Program.  If you have any additional questions, please feel free to contact me.    Referring Provider: Jennifer Ash DO    Presenting Information:   I met with Cheyenne Schulte today for her video genetic counseling visit through the Cancer Risk Management Program to discuss her personal history of colon polyps and family history of colon, pancreatic, brain, and lung cancer. She is here today to review this history, cancer screening recommendations, and available genetic testing options.    Personal History:  Cheyenne is a 52 year old female. She does not have any personal history of cancer. In her hormonal-based medical history, she had her first menstrual period at age 14, her first child at age 33, and premenopausal. Cheyenne has her ovaries, fallopian tubes and uterus in place, and reports no ovarian cancer screening to date. She reports no history of hormone replacement therapy. Cheyenne reports oral contraceptive use for approximately 10 years.       Cheyenne has regular clinical breast exams; her most recent mammogram in June 2022 was normal. Cheyenne began having colonoscopies at the age of 51. Her first colonoscopy in July 2022 found nineteen 3-15 mm polyps in the sigmoid colon (pathology includes fragments of tubulovillous and tubular  adenomas, sessile serrated adenomas, and hyperplastic polyps) and one 5 mm fragments of tubulovillous and tubular adenoma in the ascending colon. Follow-up was recommended in 3 months. Her most recent colonoscopy in October 2022 found two 4-8 mm tubular adenomas at the hepatic flexure, three 3-6 mm and one 2 mm sessile serrated adenomas and fragments of hyperplastic polyps in the descending colon, twenty 3-8 mm fragments of sessile serrated adenomas and hyperplastic polyps in the rectum and sigmoid colon. Follow-up was recommended in 6 months. Cheyenne denies any history of dermatological exams. She does not regularly do any other cancer screening at this time. Candida reported smoking for approximately 30 years and occasional alcohol use.    Family History: (Please see scanned pedigree for detailed family history information)    Cheyenne's mother was diagnosed with colon cancer at age 64. It was reported that she had between 10-19 colon polyps. She passed away at age 70.    Maternal grandmother was diagnosed with pancreatic cancer at age 80. She passed away at age 85.    Paternal aunt was diagnosed with brain cancer at age 69. She is currently 70.    Paternal grandmother was diagnosed with lung cancer at age 84. It was reported that she was a smoker. She passed away at age 86.     Paternal grandfather was diagnosed with leukemia at age 50. He passed away at age 72.     Her maternal ethnicity is [place], [place], and [place]. Her paternal ethnicity is English, Pakistani, and [place].  There is no known Ashkenazi Baptist ancestry on either side of her family. There is no reported consanguinity.    Discussion:  Cheyenne's personal history of colon polyps and family history of colon and pancreatic cancer is suggestive of a hereditary cancer syndrome.  We reviewed the features of sporadic, familial, and hereditary cancers. We discussed the natural history and genetics of several hereditary cancer syndromes, including  MUTYH-Associated Polyposis (MAP) and Bautista syndrome:  MUTYH-Associated Polyposis (MAP) is caused by mutations in the MUTYH gene. Individuals with MAP typically have 10 to more than 100 adenoma type polyps in the colon and the gastrointestinal tract, as well as increased colon and duodenal cancer risk. Everyone has two copies of the MUTYH gene. In order to be affected with MAP, a person must have a mutation in both copies of the gene. Carriers of MAP (people who only have one mutation in one copy of the MUTYH gene) may have a very slightly increased risk of colon cancer.   Bautista syndrome can be caused by a mutation in one of five genes:  MLH1, MSH2, MSH6, PMS2, and EPCAM. A single mutation in one of the Bautista Syndrome genes increases the risk for several cancers, such as colon cancer, endometrial/uterine cancer, gastric cancer, and ovarian cancer. Other cancers have also been reported in some families with Bautista Syndrome include pancreatic, bladder, biliary tract, urothelial, small bowel, prostate, breast and brain cancers.  A detailed handout regarding information about MAP, Bautista syndrome, and related hereditary cancer syndromes will be provided to Candida via [employer] and can be found in the after visit summary. Topics included: inheritance pattern, cancer risks, cancer screening recommendations, and also risks, benefits and limitations of testing.  In looking at Cheyenne's personal and family history, it is possible that a cancer susceptibility gene is present due to her personal history of numerous colon polyps and her mother diagnosed with colon cancer at age 64 and was reported to have between 10-19 colon polyps.   Candida has had a total of 46 colon polyps. These polyps have had a combination of pathology including: tubular adenoma, sessile serrated adenoma, and hyperplastic. Based on pathology reports from her colonoscopies in July 2022 and  October 2022, it is unclear the exact number of polyps that correlate  to each pathology type. Candida was found to have at least three tubular adenomas, however, this number is likely higher. Therefore, her risk for a hereditary cancer syndrome associated with colon polyposis and colon cancer may be underestimated. If Candida has had 10 or more adenomatous colon polyps, she would meet the genetic testing criteria for consideration of genetic testing for colon polyposis syndromes (APC and MUTYH).  Additionally, Candida mother's diagnosis of colon cancer at age 64 and was reported to have numerous colon polyps. Her mother's history may be suspicious for a hereditary cancer syndrome. The pathology of her colon polyps are unknown. If she had greater than 10 adenomatous colon polyps, then Cheyenne's mother would have meet the genetic testing criteria of colon polyposis syndromes (APC and MUTYH). Since this information is unknown, it could underestimate Candida's risk for hereditary cancer syndrome. Therefore, it would be reasonable for Candida to pursue genetic testing to better understand her risk of a hereditary cancer syndrome and chance of developing cancer.  In looking at Candida's personal and family history, it is possible that a cancer susceptibility gene is present due to her mother diagnosed with colon cancer at age 64 and her maternal grandmother diagnosed with pancreatic cancer at age 80.   Since Candida only has two close relatives diagnosed with Bautista related cancers, she does not meet current National Comprehensive Cancer Network (NCCN) criteria for genetic testing of Bautista syndrome genes. However, Candida only needs one additional relative to be diagnosed with a Bautista related cancer in order meet NCCN criteria. Therefore, it would be reasonable for Cheyenne to purse testing to better understand her risk of a hereditary cancer syndrome and risk of developing cancer.  We discussed that there are additional genes that could cause increased risk for adenomatous polyposis  colon  cancer. As many of these genes present with overlapping features in a family and accurate cancer risk cannot always be established based upon the pedigree analysis alone, it would be reasonable for Candida to consider panel genetic testing to analyze multiple genes at once.  We reviewed genetic testing options for Candida based on her personal and family history: a panel of genes associated with an increased risk for polyposis, colon cancer, and pancreatic cancer, or larger panel options to include genes associated with increased risk for multiple different cancer types. Candida expressed interest in the APC and MUTYH genes with automatic reflex to the Common Hereditary Cancers panel through [facility].   Genetic testing is available for 47 genes associated with cancers of the breast, ovary, uterus, prostate and gastrointestinal system: [facility] Common Hereditary Cancers panel (APC, SHAHANA, AXIN2, BARD1, BMPR1A, BRCA1, BRCA2, BRIP1, CDH1, CDK4, CDKN2A, CHEK2, CTNNA1, DICER1, EPCAM, GREM1, HOXB13, KIT, MEN1, MLH1, MSH2, MSH3, MSH6, MUTYH, NBN, NF1, NTHL1, PALB2, PDGFRA, PMS2, POLD1, POLE, PTEN,RAD50, RAD51C, RAD51D, SDHA, SDHB, SDHC, SDHD, SMAD4, SMARCA4, STK11, TP53,TSC1, TSC2, VHL).    We discussed that many of these genes are associated with specific hereditary cancer syndromes and published management guidelines: Hereditary Breast and Ovarian Cancer syndrome (BRCA1, BRCA2), Bautista syndrome (MLH1, MSH2, MSH6, PMS2, EPCAM), Familial Adenomatous Polyposis (APC), Hereditary Diffuse Gastric Cancer (CDH1), Familial Atypical Multiple Mole Melanoma syndrome (CDK4, CDKN2A), Multiple Endocrine Neoplasia type 1 (MEN1), Juvenile Polyposis syndrome (BMPR1A, SMAD4), Cowden syndrome (PTEN), Li Fraumeni syndrome (TP53), Hereditary Paraganglioma and Pheochromocytoma syndrome (SDHA, SDHB, SDHC, SDHD), Peutz-Jeghers syndrome (STK11), MUTYH Associated Polyposis (MUTYH), Tuberous sclerosis complex (TSC1, TSC2), Von Hippel-Lindau  disease (VHL), and Neurofibromatosis type 1 (NF1).   The SHAHANA, AXIN2, BRIP1, CHEK2, GREM1, MSH3, NBN, NTHL1, PALB2, POLD1, POLE, RAD51C, and RAD51D genes are associated with increased cancer risk and have published management guidelines for certain cancers.   The remaining genes (BARD1, CTNNA1, DICER1, HOXB13, KIT, PDGFRA, RAD50, and SMARCA4) are associated with increased cancer risk and may allow us to make medical recommendations when mutations are identified.   Cheyenne would like to submit a blood sample for her genetic testing. She will go to her [facility] at her earliest convenience to get her blood drawn for her genetic testing.   Verbal consent was given over video and written on the consent form. Turnaround time is approximately 3-4 weeks once the lab receives the sample.    Medical Management: For Candida, we reviewed that the information from genetic testing may determine:    additional cancer screening for which Candida may qualify (i.e. mammogram and breast MRI, more frequent colonoscopies, more frequent dermatologic exams, etc.),    options for risk reducing surgeries Candida could consider (i.e. bilateral mastectomy, surgery to remove her ovaries and/or uterus, etc.),      and targeted chemotherapies if she were to develop certain cancers in the future (i.e. immunotherapy for individuals with Bautista syndrome, PARP inhibitors, etc.).     These recommendations and possible targeted chemotherapies will be discussed in detail once genetic testing is completed.     Plan:  1) Today Cheyenne elected to proceed with the APC and MUTYH genes with automatic reflex to the Common Hereditary Cancers panel through [facility]. Therefore, consent was reviewed and verbally obtained for this testing.   2) Cheyenne plans to schedule her blood draw appointment at a clinic that is convenient to her.   3) The results should be available in 3-4 weeks after her blood is drawn.  4) Candida will either  have a telephone visit or video visit to discuss the results.  Additional recommendations about screening will be made at that time.    Candida is 52 year old and is being evaluated via a billable video visit.    Time spent on video: 29 minutes    Tanika Biswas MS, [facility]  Licensed, Certified Genetic Counselor  Phone: [phone]

## 2023-01-24 NOTE — PATIENT INSTRUCTIONS
Assessing Cancer Risk  Only about 5-10% of cancers are thought to be due to an inherited cancer susceptibility gene.    These families often have:  Several people with the same or related types of cancer  Cancers diagnosed at a young age (before age 50)  Individuals with more than one primary cancer  Multiple generations of the family affected with cancer    Comprehensive Colon Cancer Panel  We each inherit two copies of every gene in our bodies: one from our mother, and one from our father.  Each gene has a specific job to do.  When a gene has a mistake or  mutation  in it, it does not work like it should.      This handout will review common hereditary colon cancer syndromes, and other genes related to an increased risk for colon cancer.  The genes that will be discussed in this handout are: APC, BMPR1A, CDH1, CHEK2, EPCAM, GREM1, MLH1, MSH2, MSH6, MUTYH, PMS2, POLD1, POLE, PTEN, SMAD4, STK11, and TP53.  These genes are clinically actionable, meaning there are published guidelines for cancer screening and management for individuals who are found to carry mutations in these genes. Inheriting a mutation does not mean a person will develop cancer, but it does significantly increase his or her risk above the general population risk.      Familial Adenomatous Polyposis (FAP)  FAP is a hereditary cancer syndrome caused by mutations in the APC gene. The condition is known to cause hundreds to thousands of adenomatous polyps in the colon, creating a  carpet  of polyps. Some individuals have what is called attenuated FAP (AFAP), a milder form of FAP with fewer polyps and typically later onset. Individuals with an APC gene mutation are at an increased risk for colon, thyroid, and duodenal cancers, as well as several other types of cancer1.  Other features of this condition may include: osteomas, dental anomalies, benign skin lesions, CHRPE ( freckle  on the inside of the eye), and desmoid tumors.      Lifetime Cancer  Risks     Cancer Type General Population FAP   Colon  5% near 100%   Thyroid (papillary) 1% 1-12%   Duodenal <1% 5%   Liver  <1% 1-2% before age 5   Pancreas <1% 1%   Stomach <1% 1%       Juvenile Polyposis Syndrome (JPS)  JPS is characterized by hamartomatous polyps, called juvenile polyps, in the gastrointestinal tract.  Juvenile  refers to the type of polyps seen in this hereditary cancer condition, not the age of onset. Currently, mutations in two genes are known to cause JPS: BMPR1A and SMAD4. Of individuals clinically diagnosed with JPS, only 40% have an identifiable mutation in one of these genes, suggesting there are other genes that cause JPS that have not been discovered yet. Individuals with JPS are at an increased risk for colon cancer and stomach cancer 2,3,4. Pancreatic and small bowel cancers have also been reported in JPS, but the actual risks are unknown.         Lifetime Cancer Risks    Cancer Type General Population JPS   Colon 5% 40-50%   Gastric/Duodenal <1% 10-21%     Some individuals with SMAD4 mutations have a condition called JPS/HHT (Juvenile Polyposis/Hereditary Hemorrhagic Telangiectasia) where in addition to JPS, individuals may have nose bleeds and clotting issues.     Hereditary Diffuse Gastric Cancer (HDGC)  Currently, mutations in one gene are known to cause Hereditary Diffuse Gastric Cancer: CDH1.  Individuals with HDGC are at increased risk for diffuse gastric cancer and lobular breast cancer. Of people diagnosed with HDGC, 30-50% have a mutation in the CDH1 gene.  This suggests there are likely mutations in other genes that may cause HDGC that have not been identified yet. Individuals with HDGC may also be at increased risk for colon cancer.      Lifetime Cancer Risks    Cancer Type General Population HDGC   Diffuse Gastric <1% 67-83%   Breast 12% 39-52%     Bautista syndrome  Mutations in five different genes are known to cause Bautista syndrome: MLH1, MSH2, MSH6, PMS2, and EPCAM.  Individuals with Bautista syndrome have an increased risk for colon, uterine, ovarian, small bowel, stomach, urinary tract, and brain cancer, as well as several other types of cancer. The exact lifetime cancer risks are dependent upon the gene in which the mutation was identified.      Lifetime Cancer Risks    Cancer Type General Population Bautista syndrome   Colon 5% 12-52%   Uterine 2-3% Up to 57%   Stomach <1% Up to 16%   Ovarian 2% Up to 38%   Urinary tract <1% 1-7%   Hepatobiliary tract <1% 1-4%   Small bowel <1% Up to 11%   Brain/CNS <1% Not well established   Pancreas <1% Up to 6%       MUTYH-Associated Polyposis (MAP)  MAP is a hereditary cancer syndrome caused by mutations in the MUTYH gene. Unlike the other hereditary cancer genes discussed in this handout, two mutations in the MUTYH gene cause MAP and increase cancer risk. Those affected with MAP typically have between  adenomatous polyps. This syndrome also increases the risk for colon and duodenal cancer. Current research suggests that other cancers may be associated with MUTYH mutations, as well. The table below includes the risk that someone with two MUTYH gene mutations would develop cancer in their lifetime; of note, there is also an increased colon cancer risk for individuals who carry only one MUTYH gene mutation5,6,7.       Lifetime Cancer Risks    Cancer Type General Population MAP   Colon 5% %   Duodenal  <1% 5%       Cowden syndrome  Cowden syndrome is a hereditary condition that increases the risk for breast, thyroid, endometrial, colon, and kidney cancer.  A single mutation in the PTEN gene causes Cowden syndrome and increases cancer risk.  The table below shows the chance that someone with a PTEN mutation would develop cancer in their lifetime8,9.  Other benign features seen in some individuals with Cowden syndrome include benign skin lesions (facial papules, keratoses, lipomas), learning disabilities, autism, thyroid nodules,  hamartomatous colon polyps, and larger head size.      Lifetime Cancer Risks   Cancer Type General Population Cowden Syndrome   Breast 12% 25-50%*   Thyroid 1% 35%   Renal 1-2% 35%   Endometrial 2-3% 28%   Colon 5% 9%   Melanoma 2-3% >5%     *One recent study found breast cancer risk to be increased to 85%    Peutz-Jeghers syndrome (PJS)  PJS is a hereditary cancer syndrome caused by mutations in the STK11 gene. This condition can be distinguished from other hereditary syndromes by the presence of hamartomatous polyps in the gastrointestinal tract and freckles present in unusual places such as the hands, feet, neck, and lips. Individuals with Peutz-Jeghers syndrome have an increased risk for colon, breast, pancreatic, and other cancers3.  Men are at risk for testicular tumors which can affect hormones in the body. Women are at risk for sex cord tumors of the ovaries and a rare aggressive type of cervical cancer.     Lifetime Cancer Risks    Cancer Type General Population PJS   Breast 12% 45-50%   Colon 5% 39%   Stomach <1% 29%   Pancreas 1.5% 11-36%   Small Intestine <1% 13%     Ovarian  2%  18%   Lung 6-7% 15-17%     Additional Genes Associated with Increased Colon Cancer Risk  CHEK2  CHEK2 is a moderate-risk breast cancer gene.  Women who have a mutation in CHEK2 have around a 2-fold increased risk for breast cancer compared to the general population, and this risk may be higher depending upon family history.12,13,14.  Mutations in CHEK2 have also been shown to increase the risk of a number of other cancers, including colon and prostate, however these cancer risks are currently not well understood.     GREM1  GREM1 is a moderate-risk colon polyposis gene. Duplications of this gene are more commonly found in individuals with Ashkenazi Cheondoism [id number]. Mutations in GREM1 are associated with colon polyps and therefore an increased risk of colon cancer; however the estimated cancer risk is not well [id number].      POLD1 and POLE  POLD1 and POLE are moderate-risk colon cancer genes. Carriers of a mutation in one of these genes increases the lifetime risk of colorectal [id number],18,19,20. Mutations in these genes may also be associated with increased risk for other cancers including: endometrial cancer, duodenal adenomas and carcinomas, and brain tumors.    TP53  Li Fraumeni syndrome (LFS) is a hereditary cancer predisposition syndrome. LFS is caused by a mutation in the TP53 gene. A single mutation in one of the copies of TP53 increases the risk for multiple cancers. Individuals with LFS are at an increased risk for developing cancer at a young age. The general lifetime risk for development of cancer is 50% by age 30 and 90% by age 60.      Core Cancers: Sarcomas, Breast, Brain, Lung, Leukemias/Lymphomas, Adrenocortical carcinomas  Other Cancers: Gastrointestinal, Thyroid, Skin, Genitourinary    Genetic Testing  Genetic testing involves a simple blood test and will look at the genetic information in genes associated with an increased risk of colon cancer. The tests look for any harmful mutations that are associated with increased cancer risk.  If possible, it is recommended that the person(s) who has had cancer be tested before other family members.  That person will give us the most useful information about whether or not a specific gene mutation is associated with the cancer in the family.     Results  There are three possible results from genetic testing:  Positive--a harmful mutation was identified  Negative--no mutation was identified  Variant of unknown significance--a variation in one of the genes was identified, but it is unclear how this impacts cancer risk in the family  Advantages and Disadvantages  There are advantages and disadvantages to genetic testing of these genes.    Advantages  May clarify your cancer risk  Can help you make medical decisions  May explain the cancers in your family  May give useful  information to your family members (if you share your results)    Disadvantages  Possible negative emotional impact of learning about inherited cancer risk  Uncertainty in interpreting a negative test result in some situations  Possible genetic discrimination concerns (see below)    Inheritance   Most mutations in the genes outlined above are inherited in an autosomal dominant pattern.  This means that if a parent has a mutation, each of their children will have a 50% chance of inheriting that same mutation.  Therefore, each child--male or female--would have a 50% chance of being at increased risk for developing cancer.                                              Image obtained from [employer], 2013     In the case of MUTYH-Associated Polyposis (MAP) this hereditary cancer syndrome is inherited in an autosomal recessive pattern. This means that each parent of an individual with MAP is a carrier of MAP, meaning that they have only one mutation in MUTYH. They still have one functioning copy of their gene.  Carriers are at a slightly higher risk for colon cancer than the general population. If each parent is a carrier for MAP, they have a 25% of having a child who is affected with MAP, meaning the child inherited both gene mutations - one from each parent.       Image obtained from [employer], 2016    Genetic Information Nondiscrimination Act (KADI)  The Genetic Information Nondiscrimination Act of 2008 (KADI) is a federal law that protects individuals from health insurance or employment discrimination based on a genetic test result alone (with some exceptions, including employers with fewer than 15 employees, and ).  Although rare, KADI  does not cover discrimination protections in terms of life insurance, long term care, or disability insurances.  Visit the National Human Genome Research [place] website to learn more. Visit the National Knowledge[employer] Research [place] at  Genome.gov/52710506 to learn more.    Reducing Cancer Risk  Each of the genes listed within this handout have nationally recognized cancer screening guidelines that would be recommended for individuals who test positive.  In addition to increased cancer screening, surgeries may be offered or recommended to reduce cancer risk in certain cases.  Recommendations are based upon an individual s genetic test result as well as their personal and family history of cancer.    Questions to Think About Regarding Genetic Testing  What effect will the test result have on me and my relationship with my family members if I have an inherited gene mutation?  If I don t have a gene mutation?  Should I share my test results, and how will my family react to this news, which may also affect them?  Are my children ready to learn new information that may one day affect their own health?    Resources    PTEN World PTENworld.[email or web address]   No Stomach for Cancer, Inc. nostomachforcancer.org   Stomach Cancer Relief Network scrnet.org   Collaborative Group of the Americas on Inherited Colorectal Cancer (CGA) cgaicc.com   Cancer Care cancercare.org   American Cancer Society (ACS) cancer.org   National Cancer [place] (NCI) cancer.org   Bautista Syndrome International [email or web address].[email or web address]       Please call us if you have any questions or concerns.     Cancer Risk Management Program [phone] ([phone])  Kulwinder Marquis, MS [facility] [place]  [phone]  Tanika Biswas, MS, [facility] [place] [phone]  Porsche Gandhi, MS, [facility] [place]  [phone]  Yuli Lo, MS, [facility] [place]  [phone]  Wendy Edmonds, MS, [facility] [place]  [phone]  Zully Saleh, MS, [facility] [place] [phone]  Bushra Hernandez, MS, [facility] [place] [phone]    References    Adriana ALVAREZ, Luke CASTELLON, Mac G, Fernando E, Stacy J, et al. The Prevalence of thyroid cancer and benign thyroid disease in patients with familial adenomatous polyposis may be higher than previously recognized. Clin Colorectal Cancer. 2012;11:304-308.  Jatinder Amos,  Apolinar HERNANDEZ, Kerri FREGOSO, Johnna COLLADO, et al. Risk of colorectal cancer in juvenile polyposis. Gut. 2007;56:965-967.  Abebe FG, Pablito MN, Isaac CA. Colorectal cancer risk in hamartomatous polyposis syndromes. World Journal of Gastrointestinal Surgery. 2015;27:25-32  Monique DAVIDSON. Guidance on gastrointestinal surveillance for hereditary non-polyposis colorectal cancer, familial adenomatous polypolis, juvenile polyposis, and Peutz-Jeghers syndrome. Gut. 2002;51:21-27.  Alvin AK, Kimo ALICIA, Joanna JG et al. Risk of extracolonic cancers for people with biallelic and monoallelic mutations in MUTYH. Int J of Cancer. 2016;139:8638-1336.  Sumaya S, Selvin S, Lyubov H, Hermilo K, Nuñez M, et al. MUTYH-associated polyposis: 70 of 71 patients with biallelic mutations present with an attenuated or atypical phenotype. Int J of Cancer. 2006;119:807-814.  Coretta G, Yamilex F, Curtis I, Bárbara M, Coretta H, et al. MUTYH mutation carriers have increased breast cancer risk. Cancer. 2012;3020-0687.  Fernando MH, Trina J, Phuc J, Carolynn LA, Orjosr MS, Eng C. Lifetime cancer risks in individuals with germline PTEN mutations. Clin Cancer Res. 2012;18:400-7.  Pilmaryki R. Cowden Syndrome: A Critical Review of the Clinical Literature. J [place] . 2009:18:13-27.  National Comprehensive Cancer Network. Clinical practice guidelines in oncology, colorectal cancer screening. Available online (registration required). 2013.  National Cancer [place]. SEER Cancer Stat Fact Sheets.  December 2013.  CHEK2 Breast Cancer Case-Control Consortium. CHEK2*1100delC and susceptibility to breast cancer: A collaborative analysis involving 10,860 breast cancer cases and 9,065 controls from 10 studies. Am J Hum [place], 74 (2004), pp. 8131-5770  Miguel T, Jackeline S, Cezar K, et al. Spectrum of Mutations in BRCA1, BRCA2, CHEK2, and TP53 in Families at High Risk of Breast Cancer. KOBI. 2006;295(12):7357-3754.  Charli FREGOSO, Uri NEELY, Aaron MEZA, et  al. Risk of breast cancer in women with a CHEK2 mutation with and without a family history of breast cancer. J Clin Oncol. 2011;29:5862-8947.  Nick CASTELLON, Chucky E, Juancarlos J, Ricky N, Abiel SERRANO et al. Defining the polyposis/colorectal cancer phenotype associated with the GREM1 duplication: counseling and management guidelines. [place] .Res. 2016;98:1-5.  Pretty ALVAREZ, Panda S, Con A, Shilpa Iraheta, et al. Hereditary mixed polyposis syndrome is caused by a 40kb upstream duplication that leads to increased and ectopic expression of the BMP antagonist GREM1. Mary Jo [place]ncia. 2015;44:699-703.  ZENOBIA Amaya. et al. Germline mutations affecting the proofreading domains of POLE and POLD1 predispose to colorectal adenomas and carcinomas. Mary Jo. [place]. 45, 136-44 (2013).  JASON Carrillo. et al. POLE and POLD1 mutations in 529 susannah with familial colorectal cancer and/or polyposis: review of reported cases and recommendations for genetic testing and surveillance. [place]. Med. (2015). doi:10.1038/gim.2015.75  DAVID Mccauley. et al. New insights into POLE and POLD1 germline mutations in familial colorectal cancer and polyposis. Hum. Mol. [place]. 23, 1535-64 (2014).  BRENDA Fierro. et al. Frequency and phenotypic spectrum of germline mutations in POLE and seven other polymerase genes in 266 patients with colorectal adenomas and carcinomas. Int. J. Cancer 137, 320-31 (2015).

## 2023-01-30 ENCOUNTER — LAB (OUTPATIENT)
Dept: LAB | Facility: CLINIC | Age: 53
End: 2023-01-30
Payer: COMMERCIAL

## 2023-01-30 DIAGNOSIS — Z80.0 FAMILY HISTORY OF PANCREATIC CANCER: ICD-10-CM

## 2023-01-30 DIAGNOSIS — Z80.6 FAMILY HISTORY OF LEUKEMIA: ICD-10-CM

## 2023-01-30 DIAGNOSIS — Z86.0100 HISTORY OF COLON POLYPS: ICD-10-CM

## 2023-01-30 DIAGNOSIS — Z80.8 FAMILY HISTORY OF BRAIN CANCER: ICD-10-CM

## 2023-01-30 DIAGNOSIS — Z80.0 FAMILY HISTORY OF COLON CANCER: ICD-10-CM

## 2023-01-30 DIAGNOSIS — Z80.1 FAMILY HISTORY OF LUNG CANCER: ICD-10-CM

## 2023-01-30 PROCEDURE — 36415 COLL VENOUS BLD VENIPUNCTURE: CPT

## 2023-01-30 PROCEDURE — 99000 SPECIMEN HANDLING OFFICE-LAB: CPT

## 2023-02-08 NOTE — PROGRESS NOTES
"Video-Visit Details  Type of service:  Video Visit  Originating Location (pt. Location): Home  Distant Location (provider location):  Off-site  Mode of Communication:  Video Conference via [employer]  JACOB ALEJANDRE VVF    2/9/2023    Referring Provider: Jennifer Ash DO    Presenting Information:  I spoke to Cheyenne today to discuss her genetic testing results. Her blood was drawn on 1/30/2023. The APC and MUTYH genes with automatic reflex to the Common Hereditary Cancers panel was ordered from [facility]. This testing was done because of Cheyenne's personal history of colon polyps and family history of colon and pancreatic cancer.    Genetic Testing Result: NEGATIVE  Candida is negative for mutations in APC, SHAHANA, AXIN2, BARD1, BMPR1A, BRCA1, BRCA2, BRIP1, CDH1, CDK4, CDKN2A, CHEK2, CTNNA1, DICER1, EPCAM, GREM1, HOXB13, KIT, MEN1, MLH1, MSH2, MSH3, MSH6, MUTYH, NBN, NF1, NTHL1, PALB2, PDGFRA, PMS2, POLD1, POLE, PTEN, RAD50, RAD51C, RAD51D, SDHA, SDHB, SDHC, SDHD, SMAD4, SMARCA4, STK11, TP53, TSC1, TSC2, and VHL. No mutations were found in any of the 47 genes analyzed. This test involved sequencing and deletion/duplication analysis of all genes with the exceptions of EPCAM and GREM1 (deletions/duplications only) and SDHA (sequencing only).    A copy of the test report can be found in the Laboratory tab, dated 1/30/2023, and named \"LABORATORY MISCELLANEOUS ORDER\". The report is scanned in as a linked document.    Interpretation:  We discussed several different interpretations of this negative test result.    1. One explanation may be that there is a different gene or combination of genes and environment that are associated with the cancers in this family.  2. It is possible that her relatives diagnosed with cancer did have a mutation and she did not inherit it.  3. There is also a small possibility that there is a mutation in one of these genes, and the testing laboratory could not find it with their current " testing methods.       Screening:  Based on this negative test result, it is important for Cheyenne and her relatives to refer back to the family history for appropriate cancer screening.    Candida should continue with her colon screening per the recommendations of her gastroenterologist, given her personal history of colon polyps.   Candida does meet clinic criteria for a diagnosis of serrated polyposis syndrome (SPS) as she was found to have more than 20 serrated lesions/polyps.   Serrated polyposis syndrome definition:  A clinical diagnosis of serrated polyposis is considered if an individual meets at least one of the following criteria:   1) > 5 serrated lesions/polyps proximal to the rectum, all being > 5 mm in size, with >2 being >10 mm in size   2) >20 serrated lesions/polyps of any size distributed throughout the large bowel with >5 being proximal to the rectum.   Any histologic subtype of serrated lesion/polyp (hyperplastic polyp, sessile serrated lesion without or with dysplasia, traditional serrated adenoma, and unclassified serrated adenoma) is included in the final polyp count. The polyp count is cumulative over multiple colonoscopies.   Per the National Comprehensive Cancer Network (NCCN, v.2.2021), the following surveillance for SPS is recommended:  Screening for those with serrated polyposis: Colonoscopy with polypectomy until all polyps > 5 mm are removed, then colonoscopy every 1-3 years. Screening frequency depends on the size and number of polyps. Consider surgical referral if colonoscopic treatment and/or surveillance is inadequate.   Individuals with a family history of serrated polyposis: NCCN notes that colon cancer risk in first-degree relatives of individuals who have SPS is elevated but exact risks are unclear at this time. First degree relatives (parents, siblings, children) of the affected individual are recommended to have a colonoscopy at whichever of the following is earliest:   Age  40  Same age as the youngest diagnosis of SPS in the family, if uncomplicated by cancer.  10 years before the earliest diagnosis of colorectal cancer in the family secondary to serrated polyposis.   After the baseline colonoscopy, repeat colonoscopy every 5 years if no polyps are identified. If proximal serrated polyps or multiple adenomas are identified, consider repeating colonoscopy every 1-3 years.     Cheyenne's close relatives should be made aware of her colon polyp history and talk with their primary care providers about recommendations for their colonoscopy screening.  Per National Comprehensive Cancer Network (NCCN) guidelines, individuals with a first degree relative with colorectal cancer diagnosed at any age should begin colonoscopy at age 40, or 10 years before the earliest diagnosis of colorectal cancer, whichever is first.  In this family, colonoscopy should start at age 40. Colonoscopy should be repeated every 5 years, or based on colonoscopy findings.  This would apply to Cheyenne's maternal half brother.  These recommendations may change based on personal and family history as well as personal preference, and should be discussed with an individuals physician.      We discussed her maternal grandmother's history of pancreatic cancer. There are currently no screening guidelines for individuals with one relative with pancreatic cancer in the absence of an inherited gene mutation. However, we discussed that pancreatic cancer screening guidelines may change in the future, therefore Candida should continue to discuss current screening recommendations with her physicians.     Other population cancer screening options, such as those recommended by the American Cancer Society and the National Comprehensive Cancer Network (NCCN), are also appropriate for Cheyenne and her family. These screening recommendations may change if there are changes to Cheyenne's personal and/or family history of cancer.     Final  screening recommendations should be made by each individual's primary care provider.    Inheritance:  We reviewed autosomal dominant inheritance. We discussed that Candida did not pass on an identifiable mutation in these genes to her children based on this test result.  Mutations in these genes do not skip generations.      Additional Testing Considerations:  It is possible Candida does carry a currently identifiable gene or combination of genes and environment that increase her risk for colon polyps or colon cancer. We again reviewed the option of including rare/preliminary evidence genes associated with colon polyps and colon cancer. We discussed the benefits and limitations of additional genetic testing in more detail. Cheyenne elected to proceed with 9 additional preliminary evidence genes (BLM, BUB1B, CEP57, ENG, FLCN, GALNT12, MLH3, RNF43, RPS20) associated with colon polyps and colon cancer through [facility]. The results should be available in 1 week. I will call Cheyenne to discuss the results once they become available.    Although Candida's genetic testing result was negative, other relatives may still carry a gene mutation associated with colon and pancreatic cancer. Genetic counseling is recommended for Cheyenne's maternal half brother and other maternal relatives to discuss genetic testing options. If any of these relatives do pursue genetic testing, Candida is encouraged to contact me so that we may review the impact of their test results on her.    Summary:  We do not have an explanation for Candida's history of colon polyps and family history of cancer. While no genetic changes were identified, Candida may still be at risk for certain cancers due to family history, environmental factors, or other genetic causes not identified by this test.  Because of that, it is important that she continue with cancer screening based on her personal and family history as discussed above.    Genetic testing is  rapidly advancing, and new cancer susceptibility genes will most likely be identified in the future.  Therefore, I encouraged Cheyenne to contact me regularly or if there are changes in her personal or family history.  This may change how we assess her cancer risk, screening, and the testing we would offer.    Plan:  1. A copy of the test results will be mailed to Candida.  2. A re-requisition order will be placed for 9 additional preliminary evidence genes associated with colon polyps and colon cancer through [facility]. Candida will be contacted once the results are complete.  2. She plans to follow-up with her other providers.  3. She should contact me regularly, or sooner if her family history changes.    If Cheyenne has any further questions, I encouraged her to contact me at [phone].     Time spent on video: 9 minutes.    ADDENDUM 2/9/23    I spoke with Cheyenne about the results of her additional genetic testing. She elected to proceed with 9 additional preliminary evidence genes associated with colon polyps and colon cancer through [facility].     Candida is NEGATIVE for mutations in the BLM, BUB1B, CEP57, ENG, FLCN, GALNT12, MLH3, RNF43, and RPS20 genes. No mutations were found in any of the additional 9 genes analyzed. This test involved sequencing and deletion/duplication analysis.     We discussed several different interpretations of this negative test result. One explanation may be that there is a different gene or combination of genes and environment that are associated with the cancers in this family. It is possible that her relatives diagnosed with cancer did have a mutation and she did not inherit it. There is also a small possibility that there is a mutation in one of these genes, and the testing laboratory could not find it with their current testing methods.       We reviewed autosomal dominant inheritance. We discussed that Candida did not pass on an identifiable mutation in  these genes to her children based on this test result.  Mutations in these genes do not skip generations.     Based on this negative test result, it is important for Cheyenne and her relatives to refer back to the family history for appropriate cancer screening. We reviewed screening recommendations as discussed above.       I encouraged Cheyenne to contact me regularly or if there are changes in her personal or family history.  This may change how we assess her cancer risk, screening, and the testing we would offer.    Cheyenne had no additional questions at this time.     Tanika Biswas MS, [facility] [place], [place]  Licensed, Certified Genetic Counselor  Phone: [phone]

## 2023-02-09 ENCOUNTER — VIRTUAL VISIT (OUTPATIENT)
Dept: ONCOLOGY | Facility: CLINIC | Age: 53
End: 2023-02-09
Payer: COMMERCIAL

## 2023-02-09 DIAGNOSIS — Z80.8 FAMILY HISTORY OF BRAIN CANCER: ICD-10-CM

## 2023-02-09 DIAGNOSIS — Z80.1 FAMILY HISTORY OF LUNG CANCER: ICD-10-CM

## 2023-02-09 DIAGNOSIS — Z80.0 FAMILY HISTORY OF PANCREATIC CANCER: ICD-10-CM

## 2023-02-09 DIAGNOSIS — Z80.6 FAMILY HISTORY OF LEUKEMIA: ICD-10-CM

## 2023-02-09 DIAGNOSIS — Z86.0100 HISTORY OF COLON POLYPS: Primary | ICD-10-CM

## 2023-02-09 DIAGNOSIS — Z80.0 FAMILY HISTORY OF COLON CANCER: ICD-10-CM

## 2023-02-09 LAB — SCANNED LAB RESULT: NORMAL

## 2023-02-09 PROCEDURE — 999N000069 HC STATISTIC GENETIC COUNSELING, < 16 MIN: Mod: GT,95

## 2023-02-09 NOTE — NURSING NOTE
Is the patient currently in the state of [place]? YES    Visit mode:VIDEO    If the visit is dropped, the patient can be reconnected by: VIDEO VISIT: Send to e-mail at: [email or web address]    Will anyone else be joining the visit? NO      How would you like to obtain your AVS? MyChart    Are changes needed to the allergy or medication list? NO    Comments or concerns regarding today's visit: N/A    JACOB LÓPEZ

## 2023-02-09 NOTE — PATIENT INSTRUCTIONS
Negative Genetic Test Result    Genetic Testing  Genetic testing involved a blood or saliva test which looked at the genetic information in select genes for variants associated with cancer risk.  This testing may have included analysis of a single gene due to a known variant in the family, multiple genes most associated with the cancers in a family, or an expanded panel of genes related to many types of cancers.     Results  There are several possible genetic test results, including:   Positive--a harmful mutation (also known as a  pathogenic  or  likely pathogenic  variant) was identified in a gene associated with increased cancer risk.  These risks, as well as medical management options, depend on the specific genetic variant identified.    Negative--no variants were identified in the genes analyzed   Variant of unknown significance--a variant was identified in one or more genes, though it is currently unclear how this impacts cancer risk in the family.  Genetic testing labs are working to collect evidence about these uncertain variants and may provide updates in the future.    What Does a Negative Genetic Test Result Mean?  A negative genetic test results means that no genetic changes (variants) were detected in the genes tested. While no inherited risk factors for cancer were identified, you and your family may be at risk for certain cancers due to family history, environmental factors, or other genetic causes not identified by this test.  It is also possible that your family may still be at risk of carrying a genetic risk factor which you did not inherit. Your genetic counselor can help interpret the result for you and your relatives.      It is important to note which genes were included in your test. A list of these genes can be found on your test result.    Screening Recommendations  A combination of personal and family history factors may inform cancer risk and medical management recommendations.   Population cancer screening options, such as those recommended by the American Cancer Society and the National Comprehensive Cancer Network (NCCN) are appropriate for many families at average risk for cancer.  However, earlier and/or more frequent screening may be recommended based on personal factors (lifestyle, exposures, medications, screening results), family history of cancer, and sometimes genetic factors.  These cancer risk management options should be discussed in more detail with an individual's medical providers.      Please call us if you have any questions or concerns.   Cancer Risk Management Program (Appointments: [phone])  Kulwinder Marquis, MS [facility] [place]  [phone]  Tanika Biswas, MS, [facility] [place] [phone]  Porsche Gandhi, MS, [facility] [place]  [phone]  Yuli Lo, MS, [facility] [place]  [phone]  Wendy Edmonds, MS, [facility] [place]  [phone]  Zully Saleh, MS, [facility] [place] [phone]  Bushra Hernandez, MS, [facility] [place] [phone]

## 2023-02-09 NOTE — Clinical Note
"Hello,    Please enclose a copy of the test report from the laboratory tab titled \"LAB MISC ORDER\" dated 1/30/2023 to send to the patient along with the letter.    Referring provider: please see summary of genetic test results below.    Thank you,    Tanika Biswas MS, [facility] [place]  Licensed, Certified Genetic Counselor"

## 2023-02-09 NOTE — Clinical Note
"Hello,    Please enclose a copy of the test report from the laboratory tab titled \"LAB MISC ORDER\" dated 1/30/2023 to send to the patient along with the letter.    Referring provider: please see summary of genetic test results below.    Thank you,    Tanika Biswas MS, [facility]  Licensed, Certified Genetic Counselor"

## 2023-02-09 NOTE — LETTER
"    Cancer Risk Management  Program Locations    [facility]  [facility]  Mailing Address  Cancer Risk Management Program  [address]  [place] [address]    New patient appointments  [phone]  February 9, 2023    Cheyenne Schulte  [address]   [place] [place] [address]    Dear Candida,    It was a pleasure talking with you via your virtual genetic counseling visit on 2/9/2023.  Below is a copy of the progress note from that recent visit through the Cancer Risk Management Program.  If you have any additional questions, please feel free to contact me.    Referring Provider: Jennifer Ash DO    Presenting Information:  I spoke to Cheyenne today to discuss her genetic testing results. Her blood was drawn on 1/30/2023. The APC and MUTYH genes with automatic reflex to the Common Hereditary Cancers panel was ordered from [employer]. This testing was done because of Cheyenne's personal history of colon polyps and family history of colon and pancreatic cancer.    Genetic Testing Result: NEGATIVE  Candida is negative for mutations in APC, SHAHANA, AXIN2, BARD1, BMPR1A, BRCA1, BRCA2, BRIP1, CDH1, CDK4, CDKN2A, CHEK2, CTNNA1, DICER1, EPCAM, GREM1, HOXB13, KIT, MEN1, MLH1, MSH2, MSH3, MSH6, MUTYH, NBN, NF1, NTHL1, PALB2, PDGFRA, PMS2, POLD1, POLE, PTEN, RAD50, RAD51C, RAD51D, SDHA, SDHB, SDHC, SDHD, SMAD4, SMARCA4, STK11, TP53, TSC1, TSC2, and VHL. No mutations were found in any of the 47 genes analyzed. This test involved sequencing and deletion/duplication analysis of all genes with the exceptions of EPCAM and GREM1 (deletions/duplications only) and SDHA (sequencing only).    A copy of the test report can be found in the Laboratory tab, dated 1/30/2023, and named \"LABORATORY MISCELLANEOUS ORDER\". The report is " scanned in as a linked document.    Interpretation:  We discussed several different interpretations of this negative test result.    1. One explanation may be that there is a different gene or combination of genes and environment that are associated with the cancers in this family.  2. It is possible that her relatives diagnosed with cancer did have a mutation and she did not inherit it.  3. There is also a small possibility that there is a mutation in one of these genes, and the testing laboratory could not find it with their current testing methods.       Screening:  Based on this negative test result, it is important for Cheyenne and her relatives to refer back to the family history for appropriate cancer screening.    Candida should continue with her colon screening per the recommendations of her gastroenterologist, given her personal history of colon polyps.   Candida does meet clinic criteria for a diagnosis of serrated polyposis syndrome (SPS) as she was found to have more than 20 serrated lesions/polyps.   Serrated polyposis syndrome definition:  A clinical diagnosis of serrated polyposis is considered if an individual meets at least one of the following criteria:   1) > 5 serrated lesions/polyps proximal to the rectum, all being > 5 mm in size, with >2 being >10 mm in size   2) >20 serrated lesions/polyps of any size distributed throughout the large bowel with >5 being proximal to the rectum.   Any histologic subtype of serrated lesion/polyp (hyperplastic polyp, sessile serrated lesion without or with dysplasia, traditional serrated adenoma, and unclassified serrated adenoma) is included in the final polyp count. The polyp count is cumulative over multiple colonoscopies.   Per the National Comprehensive Cancer Network (NCCN, v.2.2021), the following surveillance for SPS is recommended:  Screening for those with serrated polyposis: Colonoscopy with polypectomy until all polyps > 5 mm are removed, then  colonoscopy every 1-3 years. Screening frequency depends on the size and number of polyps. Consider surgical referral if colonoscopic treatment and/or surveillance is inadequate.   Individuals with a family history of serrated polyposis: NCCN notes that colon cancer risk in first-degree relatives of individuals who have SPS is elevated but exact risks are unclear at this time. First degree relatives (parents, siblings, children) of the affected individual are recommended to have a colonoscopy at whichever of the following is earliest:   Age 40  Same age as the youngest diagnosis of SPS in the family, if uncomplicated by cancer.  10 years before the earliest diagnosis of colorectal cancer in the family secondary to serrated polyposis.   After the baseline colonoscopy, repeat colonoscopy every 5 years if no polyps are identified. If proximal serrated polyps or multiple adenomas are identified, consider repeating colonoscopy every 1-3 years.     Cheyenne's close relatives should be made aware of her colon polyp history and talk with their primary care providers about recommendations for their colonoscopy screening.  Per National Comprehensive Cancer Network (NCCN) guidelines, individuals with a first degree relative with colorectal cancer diagnosed at any age should begin colonoscopy at age 40, or 10 years before the earliest diagnosis of colorectal cancer, whichever is first.  In this family, colonoscopy should start at age 40. Colonoscopy should be repeated every 5 years, or based on colonoscopy findings.  This would apply to Cheyenne's maternal half brother.  These recommendations may change based on personal and family history as well as personal preference, and should be discussed with an individuals physician.      We discussed her maternal grandmother's history of pancreatic cancer. There are currently no screening guidelines for individuals with one relative with pancreatic cancer in the absence of an inherited  gene mutation. However, we discussed that pancreatic cancer screening guidelines may change in the future, therefore Candida should continue to discuss current screening recommendations with her physicians.     Other population cancer screening options, such as those recommended by the American Cancer Society and the National Comprehensive Cancer Network (NCCN), are also appropriate for Cheyenne and her family. These screening recommendations may change if there are changes to Cheyenne's personal and/or family history of cancer.     Final screening recommendations should be made by each individual's primary care provider.    Inheritance:  We reviewed autosomal dominant inheritance. We discussed that Candida did not pass on an identifiable mutation in these genes to her children based on this test result.  Mutations in these genes do not skip generations.      Additional Testing Considerations:  It is possible Candida does carry a currently identifiable gene or combination of genes and environment that increase her risk for colon polyps or colon cancer. We again reviewed the option of including rare/preliminary evidence genes associated with colon polyps and colon cancer. We discussed the benefits and limitations of additional genetic testing in more detail. Cheyenne elected to proceed with 9 additional preliminary evidence genes (BLM, BUB1B, CEP57, ENG, FLCN, GALNT12, MLH3, RNF43, RPS20) associated with colon polyps and colon cancer through [facility]. The results should be available in 1 week. I will call Cheyenne to discuss the results once they become available.    Although Candida's genetic testing result was negative, other relatives may still carry a gene mutation associated with colon and pancreatic cancer. Genetic counseling is recommended for Cheyenne's maternal half brother and other maternal relatives to discuss genetic testing options. If any of these relatives do pursue genetic testing, Candida is  encouraged to contact me so that we may review the impact of their test results on her.    Summary:  We do not have an explanation for Candida's history of colon polyps and family history of cancer. While no genetic changes were identified, Candida may still be at risk for certain cancers due to family history, environmental factors, or other genetic causes not identified by this test.  Because of that, it is important that she continue with cancer screening based on her personal and family history as discussed above.    Genetic testing is rapidly advancing, and new cancer susceptibility genes will most likely be identified in the future.  Therefore, I encouraged Cheyenne to contact me regularly or if there are changes in her personal or family history.  This may change how we assess her cancer risk, screening, and the testing we would offer.    Plan:  1. A copy of the test results will be mailed to Candida.  2. A re-requisition order will be placed for 9 additional preliminary evidence genes associated with colon polyps and colon cancer through [facility]. Candida will be contacted once the results are complete.  2. She plans to follow-up with her other providers.  3. She should contact me regularly, or sooner if her family history changes.    If Cheyenne has any further questions, I encouraged her to contact me at [phone].     Time spent on video: 9 minutes.    ADDENDUM 2/9/23    Cheyenne elected to proceed with 9 additional preliminary evidence genes associated with colon polyps and colon cancer through [facility].     Candida is NEGATIVE for mutations in the BLM, BUB1B, CEP57, ENG, FLCN, GALNT12, MLH3, RNF43, and RPS20 genes. No mutations were found in any of the additional 9 genes analyzed. This test involved sequencing and deletion/duplication analysis.     We discussed several different interpretations of this negative test result. One explanation may be that there is a different gene or  combination of genes and environment that are associated with the cancers in this family. It is possible that her relatives diagnosed with cancer did have a mutation and she did not inherit it. There is also a small possibility that there is a mutation in one of these genes, and the testing laboratory could not find it with their current testing methods.       We reviewed autosomal dominant inheritance. We discussed that Candida did not pass on an identifiable mutation in these genes to her children based on this test result.  Mutations in these genes do not skip generations.     Based on this negative test result, it is important for Cheyenne and her relatives to refer back to the family history for appropriate cancer screening. We reviewed screening recommendations as discussed above.       I encouraged Cheyenne to contact me regularly or if there are changes in her personal or family history.  This may change how we assess her cancer risk, screening, and the testing we would offer.    Cheyenne had no additional questions at this time.     Tanika Biswas MS, [facility]  Licensed, Certified Genetic Counselor  Phone: [phone]

## 2023-03-06 ENCOUNTER — ALLIED HEALTH/NURSE VISIT (OUTPATIENT)
Dept: FAMILY MEDICINE | Facility: CLINIC | Age: 53
End: 2023-03-06
Payer: COMMERCIAL

## 2023-03-06 DIAGNOSIS — Z30.9 CONTRACEPTIVE MANAGEMENT: ICD-10-CM

## 2023-03-06 DIAGNOSIS — Z23 HIGH PRIORITY FOR 2019-NCOV VACCINE: Primary | ICD-10-CM

## 2023-03-06 PROCEDURE — 91312 COVID-19 VACCINE BIVALENT BOOSTER 12+ (PFIZER): CPT

## 2023-03-06 PROCEDURE — 0124A COVID-19 VACCINE BIVALENT BOOSTER 12+ (PFIZER): CPT

## 2023-03-06 PROCEDURE — 96372 THER/PROPH/DIAG INJ SC/IM: CPT | Performed by: FAMILY MEDICINE

## 2023-03-06 RX ADMIN — MEDROXYPROGESTERONE ACETATE 150 MG: 150 INJECTION, SUSPENSION INTRAMUSCULAR at 11:23

## 2023-03-06 NOTE — PROGRESS NOTES
Clinic Administered Medication Documentation    Administrations This Visit     medroxyPROGESTERone (DEPO-PROVERA) injection 150 mg     Admin Date  03/06/2023 Action  $Given Dose  150 mg Route  Intramuscular Site  Right Deltoid Administered By  Zoë Dawson CMA    Ordering Provider: Armando Hsieh MD    Patient Supplied?: No                  Depo Provera Documentation    URINE HCG: not indicated    Depo-Provera Standing Order inclusion/exclusion criteria reviewed.   Patient meets: inclusion criteria     BP: Data Unavailable  LAST PAP/EXAM: No results found for: PAP    Prior to injection, verified patient identity using patient's name and date of birth. Medication was administered. Please see MAR and medication order for additional information.     Was entire vial of medication used? Yes  Vial/Syringe: Single dose vial  Expiration Date:  05/2024    Patient instructed to remain in clinic for 15 minutes and report any adverse reaction to staff immediately .    NEXT INJECTION DUE: 5/22/23 - 6/5/23    Zoë Dawson MA

## 2023-03-31 ENCOUNTER — TELEPHONE (OUTPATIENT)
Dept: GASTROENTEROLOGY | Facility: CLINIC | Age: 53
End: 2023-03-31

## 2023-03-31 DIAGNOSIS — Z86.0100 HISTORY OF COLONIC POLYPS: Primary | ICD-10-CM

## 2023-03-31 RX ORDER — BISACODYL 5 MG/1
TABLET, DELAYED RELEASE ORAL
Qty: 4 TABLET | Refills: 0 | Status: SHIPPED | OUTPATIENT
Start: 2023-03-31 | End: 2023-06-20

## 2023-03-31 NOTE — TELEPHONE ENCOUNTER
Pre assessment questions completed for upcoming Colonoscopy  procedure scheduled on 04.14.2023    COVID policy reviewed.     Pre-op exam? N/A    Reviewed procedural arrival time 0830, procedure time 0915 and facility location [facility]; [address], [place] [address]    Designated  policy reviewed. Instructed to have someone stay 24 hours post procedure.     Anticoagulation/blood thinners? No    Electronic implanted devices? No    Diabetic? No    Procedure indication:    History of colon polyps   Family history of colon cancer     Bowel prep recommendation: Miralax prep without magnesium citrate    Reviewed procedure prep instructions.     Prep instructions sent via [employer].     Patient verbalized understanding and had no questions or concerns at this time.    Mari Wren RN  Endoscopy Procedure Pre Assessment RN

## 2023-04-10 RX ORDER — NALOXONE HYDROCHLORIDE 0.4 MG/ML
0.4 INJECTION, SOLUTION INTRAMUSCULAR; INTRAVENOUS; SUBCUTANEOUS
Status: CANCELLED | OUTPATIENT
Start: 2023-04-10

## 2023-04-10 RX ORDER — LIDOCAINE 40 MG/G
CREAM TOPICAL
Status: CANCELLED | OUTPATIENT
Start: 2023-04-10

## 2023-04-10 RX ORDER — ONDANSETRON 2 MG/ML
4 INJECTION INTRAMUSCULAR; INTRAVENOUS
Status: CANCELLED | OUTPATIENT
Start: 2023-04-10

## 2023-04-10 RX ORDER — ONDANSETRON 2 MG/ML
4 INJECTION INTRAMUSCULAR; INTRAVENOUS EVERY 6 HOURS PRN
Status: CANCELLED | OUTPATIENT
Start: 2023-04-10

## 2023-04-10 RX ORDER — ONDANSETRON 4 MG/1
4 TABLET, ORALLY DISINTEGRATING ORAL EVERY 6 HOURS PRN
Status: CANCELLED | OUTPATIENT
Start: 2023-04-10

## 2023-04-10 RX ORDER — PROCHLORPERAZINE MALEATE 10 MG
10 TABLET ORAL EVERY 6 HOURS PRN
Status: CANCELLED | OUTPATIENT
Start: 2023-04-10

## 2023-04-10 RX ORDER — NALOXONE HYDROCHLORIDE 0.4 MG/ML
0.2 INJECTION, SOLUTION INTRAMUSCULAR; INTRAVENOUS; SUBCUTANEOUS
Status: CANCELLED | OUTPATIENT
Start: 2023-04-10

## 2023-04-10 RX ORDER — FLUMAZENIL 0.1 MG/ML
0.2 INJECTION, SOLUTION INTRAVENOUS
Status: CANCELLED | OUTPATIENT
Start: 2023-04-10 | End: 2023-04-11

## 2023-04-14 ENCOUNTER — TELEPHONE (OUTPATIENT)
Dept: GASTROENTEROLOGY | Facility: CLINIC | Age: 53
End: 2023-04-14

## 2023-04-14 NOTE — TELEPHONE ENCOUNTER
"Upon review: (copied from endoscopy scheduling rescheduled note)    Procedure cancelled - \"Patient reached out stating prep made her sick. Patient spoke with an RN and was advised to stop prep and reschedule. Next MAC available is going into June patient request for CS to get in sooner. Staff message sent to [facility] care team for review. Staff message sent to RN pool for prep alternatives and anti nausea medication\"    Patient was doing miralax prep. Alternative would be suprep. Writer will send staff message to scoping provider to see if ok to utilize suprep for rescheduled procedure on 6/20/23 along with zofran    Addendum 04/14/23 10:46 AM:  Staff message response from provider regarding ok to use suprep for rescheduled procedure.     Writer will send update prep instructions for suprep bowel prep.       Mari Thorpe, RN  Endoscopy Procedure Pre Assessment RN      "

## 2023-04-14 NOTE — TELEPHONE ENCOUNTER
Caller: Cheyenne RODARTE Franca    Reason for Reschedule/Cancellation (please be detailed, any staff messages or encounters to note?): Patient reached out stating prep made her sick. Patient spoke with an RN and was advised to stop prep and reschedule. Next MAC available is going into June patient request for CS to get in sooner. Staff message sent to [facility] care team for review. Staff message sent to RN pool for prep alternatives and anti nausea medication.      Prior to reschedule please review:    Ordering Provider:Jennifer Ash,     Sedation per order:MAC    Does patient have any ASC Exclusions, please identify?: NO      Notes on Cancelled Procedure:    Procedure:Lower Endoscopy [Colonoscopy]     Date: 4/14    Location:[facility]; [address], [place] [address]    Surgeon: SORAYA        Rescheduled: Yes    Procedure: Lower Endoscopy [Colonoscopy]     Date: 6/20    Location:[facility]; [address], [place] [id number]    Surgeon: SORAYA    Sedation Level Scheduled  MAC,  Reason for Sedation Level PER ORDER    Prep/Instructions updated and sent: EH

## 2023-04-14 NOTE — TELEPHONE ENCOUNTER
----- Message from Jennifer Ash DO sent at 4/14/2023  9:37 AM CDT -----  Regarding: RE: Patient requesting for CS  She needs MAC - she's had high medication requirements with conscious sedation in the past and I think MAC would allow her to have the best possible exam.  You can send her in some zofran to take with the prep to help.  Its not urgent that she come in so whenever MAC works with her schedule  ----- Message -----  From: Jennifer Esposito RN  Sent: 4/14/2023   9:32 AM CDT  To: Jennifer Ash DO  Subject: FW: Patient requesting for CS                    Hi, there...   Please see message below... is this possible?    Let me know, and I can let scheduling know.   Thanks,   Jennifer  ----- Message -----  From: Suzi Giles  Sent: 4/14/2023   8:36 AM CDT  To: Jennifer Esposito RN; Alicia Galvin RN  Subject: Patient requesting for CS                        Cheyenne Arcos was ordered a Colonoscopy by Dr Jennifer Ash. Patient was scheduled for today 4/14 at [facility] but prep made her sick and patient ended up rescheduling. Next available MAC time is going into June and patient requested if she could complete the Colonoscopy under CS sedation instead of the ordered MAC to get in for a sooner date.    Please review if patient can be scheduled with CS.    Thank you,    Suzi Garrido Procedure   [phone] option 2

## 2023-04-20 ENCOUNTER — MYC MEDICAL ADVICE (OUTPATIENT)
Dept: GASTROENTEROLOGY | Facility: CLINIC | Age: 53
End: 2023-04-20

## 2023-04-20 NOTE — TELEPHONE ENCOUNTER
Jennifer Ash, Jennifer Arvizu, JAMAR; P Mg Rn Gi Procedures; P Endoscopy Scheduling Pool  She needs MAC - she's had high medication requirements with conscious sedation in the past and I think MAC would allow her to have the best possible exam.  You can send her in some zofran to take with the prep to help.  Its not urgent that she come in so whenever MAC works with her schedule.     [employer] message sent to patient with Dr. Ash's response regarding CS vs MAC.     Jennifer Esposito RN

## 2023-06-06 RX ORDER — SODIUM, POTASSIUM,MAG SULFATES 17.5-3.13G
SOLUTION, RECONSTITUTED, ORAL ORAL
Qty: 354 ML | Refills: 0 | Status: SHIPPED | OUTPATIENT
Start: 2023-06-06 | End: 2023-06-20

## 2023-06-06 NOTE — TELEPHONE ENCOUNTER
Rescheduled Colonoscopy 06.20.2023    Patient scheduled for Colonoscopy  on 06.20.2023.     Discuss Covid policy.     Pre op exam needed? N/A    Arrival time: 1245. Procedure time 1330    Facility location: [facility]; [address], [place] [address]    Sedation type: MAC    NSAIDs? No NSAID medications per patient's medication list.  RN will verify with pre-assessment call.    Anticoagulations? No    Electronic implanted devices? No    Diabetic? No.    Indication for procedure: history of colon polyps, family history of colon cancer    Bowel prep recommendation: Suprep-per previous messages    Prep instructions sent via [employer] Bowel prep script sent to [facility] #[id number] [place], [place] - [address] [place] [address] [address]    Pre visit planning completed.    Mari Wren RN  Endoscopy Procedure Pre Assessment RN

## 2023-06-06 NOTE — TELEPHONE ENCOUNTER
Attempted to contact patient regarding upcoming Colonoscopy  procedure on 06.20.2023 for pre assessment questions. No answer.     Left message to return call to [phone] #4    Mari Wren RN  Endoscopy Procedure Pre Assessment RN

## 2023-06-07 ENCOUNTER — ALLIED HEALTH/NURSE VISIT (OUTPATIENT)
Dept: FAMILY MEDICINE | Facility: CLINIC | Age: 53
End: 2023-06-07
Payer: COMMERCIAL

## 2023-06-07 DIAGNOSIS — Z30.42 DEPO-PROVERA CONTRACEPTIVE STATUS: Primary | ICD-10-CM

## 2023-06-07 LAB — HCG UR QL: NEGATIVE

## 2023-06-07 PROCEDURE — 81025 URINE PREGNANCY TEST: CPT

## 2023-06-07 PROCEDURE — 99207 PR NO CHARGE NURSE ONLY: CPT

## 2023-06-07 PROCEDURE — 96372 THER/PROPH/DIAG INJ SC/IM: CPT | Performed by: OBSTETRICS & GYNECOLOGY

## 2023-06-07 RX ADMIN — MEDROXYPROGESTERONE ACETATE 150 MG: 150 INJECTION, SUSPENSION INTRAMUSCULAR at 13:48

## 2023-06-07 NOTE — PROGRESS NOTES
Clinic Administered Medication Documentation      Depo Provera Documentation    Depo-Provera Standing Order inclusion/exclusion criteria reviewed.     Is this the initial or subsequent dose of Depo Provera? Subsequent dose - patient is not within the acceptable window of time (11-15 weeks) for subsequent injection. Pregnancy test is indicated. Pregnancy test result: negative       Patient meets: inclusion criteria     Is there an active order (written within the past 365 days, with administrations remaining, not ) in the chart? Yes.     Prior to injection, verified patient identity using patient's name and date of birth. Medication was administered. Please see MAR and medication order for additional information.     Vial/Syringe: Single dose vial. Was entire vial of medication used? Yes    Patient instructed to remain in clinic for 15 minutes and report any adverse reaction to staff immediately.    NEXT INJECTION DUE: 23 - 23    Verified that the patient has refills remaining in their prescription.    Zoë Dawson MA

## 2023-06-08 NOTE — TELEPHONE ENCOUNTER
Pre assessment questions completed for upcoming Colonoscopy  procedure scheduled on 6.20.23    COVID policy reviewed.     Reviewed procedural arrival time 1245 and facility location [facility]; [address], [place] [address]    Designated  policy reviewed. Instructed to have someone stay 24 hours post procedure.     NSAIDs? No    Anticoagulation/blood thinners? No    Electronic implanted devices? No    Diabetic? No.    Reviewed suprep procedure prep instructions.     Patient verbalized understanding and had no questions or concerns at this time.    Shoshana Okeefe RN  Endoscopy Procedure Pre Assessment RN

## 2023-06-20 ENCOUNTER — ANESTHESIA (OUTPATIENT)
Dept: SURGERY | Facility: AMBULATORY SURGERY CENTER | Age: 53
End: 2023-06-20
Payer: COMMERCIAL

## 2023-06-20 ENCOUNTER — HOSPITAL ENCOUNTER (OUTPATIENT)
Facility: AMBULATORY SURGERY CENTER | Age: 53
Discharge: HOME | End: 2023-06-20
Attending: INTERNAL MEDICINE
Payer: COMMERCIAL

## 2023-06-20 ENCOUNTER — ANESTHESIA EVENT (OUTPATIENT)
Dept: SURGERY | Facility: AMBULATORY SURGERY CENTER | Age: 53
End: 2023-06-20
Payer: COMMERCIAL

## 2023-06-20 VITALS — HEART RATE: 81 BPM

## 2023-06-20 VITALS
HEART RATE: 78 BPM | DIASTOLIC BLOOD PRESSURE: 80 MMHG | TEMPERATURE: 98.1 F | OXYGEN SATURATION: 98 % | SYSTOLIC BLOOD PRESSURE: 101 MMHG | RESPIRATION RATE: 16 BRPM

## 2023-06-20 LAB — COLONOSCOPY: NORMAL

## 2023-06-20 PROCEDURE — G8907 PT DOC NO EVENTS ON DISCHARG: HCPCS

## 2023-06-20 PROCEDURE — 45380 COLONOSCOPY AND BIOPSY: CPT | Mod: XS

## 2023-06-20 PROCEDURE — 45385 COLONOSCOPY W/LESION REMOVAL: CPT

## 2023-06-20 PROCEDURE — G8918 PT W/O PREOP ORDER IV AB PRO: HCPCS

## 2023-06-20 PROCEDURE — 88305 TISSUE EXAM BY PATHOLOGIST: CPT | Performed by: PATHOLOGY

## 2023-06-20 RX ORDER — ONDANSETRON 4 MG/1
4 TABLET, ORALLY DISINTEGRATING ORAL EVERY 6 HOURS PRN
Status: DISCONTINUED | OUTPATIENT
Start: 2023-06-20 | End: 2023-06-21 | Stop reason: HOSPADM

## 2023-06-20 RX ORDER — SODIUM CHLORIDE, SODIUM LACTATE, POTASSIUM CHLORIDE, CALCIUM CHLORIDE 600; 310; 30; 20 MG/100ML; MG/100ML; MG/100ML; MG/100ML
INJECTION, SOLUTION INTRAVENOUS CONTINUOUS
Status: DISCONTINUED | OUTPATIENT
Start: 2023-06-20 | End: 2023-06-21 | Stop reason: HOSPADM

## 2023-06-20 RX ORDER — PROCHLORPERAZINE MALEATE 10 MG
10 TABLET ORAL EVERY 6 HOURS PRN
Status: DISCONTINUED | OUTPATIENT
Start: 2023-06-20 | End: 2023-06-21 | Stop reason: HOSPADM

## 2023-06-20 RX ORDER — ONDANSETRON 2 MG/ML
4 INJECTION INTRAMUSCULAR; INTRAVENOUS EVERY 6 HOURS PRN
Status: DISCONTINUED | OUTPATIENT
Start: 2023-06-20 | End: 2023-06-21 | Stop reason: HOSPADM

## 2023-06-20 RX ORDER — ONDANSETRON 2 MG/ML
4 INJECTION INTRAMUSCULAR; INTRAVENOUS
Status: DISCONTINUED | OUTPATIENT
Start: 2023-06-20 | End: 2023-06-21 | Stop reason: HOSPADM

## 2023-06-20 RX ORDER — PROPOFOL 10 MG/ML
INJECTION, EMULSION INTRAVENOUS CONTINUOUS PRN
Status: DISCONTINUED | OUTPATIENT
Start: 2023-06-20 | End: 2023-06-20

## 2023-06-20 RX ORDER — NALOXONE HYDROCHLORIDE 0.4 MG/ML
0.2 INJECTION, SOLUTION INTRAMUSCULAR; INTRAVENOUS; SUBCUTANEOUS
Status: DISCONTINUED | OUTPATIENT
Start: 2023-06-20 | End: 2023-06-21 | Stop reason: HOSPADM

## 2023-06-20 RX ORDER — LIDOCAINE HYDROCHLORIDE 10 MG/ML
INJECTION, SOLUTION INFILTRATION; PERINEURAL PRN
Status: DISCONTINUED | OUTPATIENT
Start: 2023-06-20 | End: 2023-06-20

## 2023-06-20 RX ORDER — NALOXONE HYDROCHLORIDE 0.4 MG/ML
0.4 INJECTION, SOLUTION INTRAMUSCULAR; INTRAVENOUS; SUBCUTANEOUS
Status: DISCONTINUED | OUTPATIENT
Start: 2023-06-20 | End: 2023-06-21 | Stop reason: HOSPADM

## 2023-06-20 RX ORDER — LIDOCAINE 40 MG/G
CREAM TOPICAL
Status: DISCONTINUED | OUTPATIENT
Start: 2023-06-20 | End: 2023-06-21 | Stop reason: HOSPADM

## 2023-06-20 RX ORDER — FLUMAZENIL 0.1 MG/ML
0.2 INJECTION, SOLUTION INTRAVENOUS
Status: DISCONTINUED | OUTPATIENT
Start: 2023-06-20 | End: 2023-06-21 | Stop reason: HOSPADM

## 2023-06-20 RX ADMIN — PROPOFOL 150 MCG/KG/MIN: 10 INJECTION, EMULSION INTRAVENOUS at 14:00

## 2023-06-20 RX ADMIN — SODIUM CHLORIDE, SODIUM LACTATE, POTASSIUM CHLORIDE, CALCIUM CHLORIDE: 600; 310; 30; 20 INJECTION, SOLUTION INTRAVENOUS at 13:08

## 2023-06-20 RX ADMIN — PROPOFOL 100 MG: 10 INJECTION, EMULSION INTRAVENOUS at 14:02

## 2023-06-20 RX ADMIN — LIDOCAINE HYDROCHLORIDE 60 MG: 10 INJECTION, SOLUTION INFILTRATION; PERINEURAL at 14:00

## 2023-06-20 ASSESSMENT — LIFESTYLE VARIABLES: TOBACCO_USE: 1

## 2023-06-20 NOTE — ANESTHESIA POSTPROCEDURE EVALUATION
Patient: Cheyenne P Franca    Procedure: Procedure(s):  Colonoscopy with CO2 insufflation  COLONOSCOPY, WITH POLYPECTOMY AND BIOPSY  COLONOSCOPY, FLEXIBLE, WITH LESION REMOVAL USING SNARE  COLONOSCOPY, WITH HEMORRHAGE CONTROL       Anesthesia Type:  MAC    Note:  Disposition: Outpatient   Postop Pain Control: Uneventful            Sign Out: Well controlled pain   PONV: No   Neuro/Psych: Uneventful            Sign Out: Acceptable/Baseline neuro status   Airway/Respiratory: Uneventful            Sign Out: Acceptable/Baseline resp. status   CV/Hemodynamics: Uneventful            Sign Out: Acceptable CV status   Other NRE: NONE   DID A NON-ROUTINE EVENT OCCUR? No           Last vitals:  Vitals Value Taken Time   /80 06/20/23 1509   Temp     Pulse 78 06/20/23 1509   Resp 16 06/20/23 1509   SpO2 98 % 06/20/23 1509       Electronically Signed By: Hema Page MD  June 20, 2023  3:19 PM

## 2023-06-20 NOTE — ANESTHESIA PREPROCEDURE EVALUATION
Anesthesia Pre-Procedure Evaluation    Patient: Cheyenne Schulte   MRN: [id number] : 1970        Procedure : Procedure(s):  Colonoscopy with CO2 insufflation          Past Medical History:   Diagnosis Date     ADHD (attention deficit hyperactivity disorder)     sees psych      Family history of colon cancer      Intermittent asthma      Mild major depression (H)     sees psych       Past Surgical History:   Procedure Laterality Date     COLONOSCOPY N/A 2022    Procedure: COLONOSCOPY, FLEXIBLE, WITH LESION REMOVAL USING SNARE;  Surgeon: Shai Da Silva DO;  Location: [facility]     COLONOSCOPY N/A 2022    Procedure: COLONOSCOPY, WITH POLYPECTOMY AND BIOPSY;  Surgeon: Shai Da Silva DO;  Location: [facility]     COLONOSCOPY N/A 10/28/2022    Procedure: COLONOSCOPY, FLEXIBLE, WITH LESION REMOVAL USING SNARE;  Surgeon: Jennifer Ash DO;  Location: [facility]     COLONOSCOPY N/A 10/28/2022    Procedure: COLONOSCOPY, WITH POLYPECTOMY AND BIOPSY;  Surgeon: Jennifer Ash DO;  Location: [facility]     COLONOSCOPY WITH CO2 INSUFFLATION N/A 2022    Procedure: COLONOSCOPY, WITH CO2 INSUFFLATION;  Surgeon: Shai Da Silva DO;  Location: [facility]     COLONOSCOPY WITH CO2 INSUFFLATION N/A 10/28/2022    Procedure: COLONOSCOPY, WITH CO2 INSUFFLATION;  Surgeon: Jennifer Ash DO;  Location: [facility]     DILATION AND CURETTAGE      AB     GYN SURGERY      c section     HERNIA REPAIR      umbilical     ORTHOPEDIC SURGERY      ORIF right tibia      Allergies   Allergen Reactions     Theophylline Cr      Zyban [Bupropion Hydrobromide]      Bupropion Rash      Social History     Tobacco Use     Smoking status: Every Day     Packs/day: 1.00     Types: Cigarettes     Smokeless tobacco: Never   Vaping Use     Vaping status: Not on file   Substance Use Topics     Alcohol use: Yes     Alcohol/week: 4.0 standard drinks of alcohol     Types: 4 Cans of beer per week     Comment: 4 drinks a week      Wt Readings from Last 1  Encounters:   10/21/22 98.4 kg (217 lb)        Anesthesia Evaluation            ROS/MED HX  ENT/Pulmonary:     (+) tobacco use, asthma     Neurologic:       Cardiovascular:       METS/Exercise Tolerance:     Hematologic:       Musculoskeletal:       GI/Hepatic:       Renal/Genitourinary:       Endo:       Psychiatric/Substance Use:     (+) psychiatric history depression     Infectious Disease:       Malignancy:       Other:            Physical Exam    Airway  airway exam normal      Mallampati: II   TM distance: > 3 FB   Neck ROM: full   Mouth opening: > 3 cm    Respiratory Devices and Support         Dental       (+) Completely normal teeth      Cardiovascular          Rhythm and rate: regular and normal     Pulmonary   pulmonary exam normal        breath sounds clear to auscultation           OUTSIDE LABS:  CBC: No results found for: WBC, HGB, HCT, PLT  BMP:   Lab Results   Component Value Date     06/07/2022    POTASSIUM 4.7 06/07/2022    CHLORIDE 109 06/07/2022    CO2 29 06/07/2022    BUN 14 06/07/2022    CR 0.82 06/07/2022     (H) 06/07/2022     COAGS: No results found for: PTT, INR, FIBR  POC:   Lab Results   Component Value Date    HCG Negative 06/07/2023     HEPATIC:   Lab Results   Component Value Date    ALBUMIN 3.8 06/07/2022    PROTTOTAL 6.8 06/07/2022    ALT 22 06/07/2022    AST 4 06/07/2022    ALKPHOS 75 06/07/2022    BILITOTAL 0.4 06/07/2022     OTHER:   Lab Results   Component Value Date    A1C 5.6 06/07/2022    DOC 8.9 06/07/2022       Anesthesia Plan    ASA Status:  2   NPO Status:  NPO Appropriate    Anesthesia Type: MAC.     - Reason for MAC: immobility needed, straight local not clinically adequate   Induction: Intravenous.   Maintenance: TIVA.        Consents    Anesthesia Plan(s) and associated risks, benefits, and realistic alternatives discussed. Questions answered and patient/representative(s) expressed understanding.    - Discussed:     - Discussed with:  Patient      -  Extended Intubation/Ventilatory Support Discussed: No.      - Patient is DNR/DNI Status: No    Use of blood products discussed: No .     Postoperative Care    Pain management: IV analgesics, Oral pain medications, Multi-modal analgesia.   PONV prophylaxis: Ondansetron (or other 5HT-3), Background Propofol Infusion     Comments:                Hema Page MD

## 2023-06-20 NOTE — ANESTHESIA CARE TRANSFER NOTE
Patient: Cheyenne Schulte    Procedure: Procedure(s):  Colonoscopy with CO2 insufflation  COLONOSCOPY, WITH POLYPECTOMY AND BIOPSY  COLONOSCOPY, FLEXIBLE, WITH LESION REMOVAL USING SNARE  COLONOSCOPY, WITH HEMORRHAGE CONTROL       Diagnosis: History of colon polyps [Z86.010]  Diagnosis Additional Information: No value filed.    Anesthesia Type:   MAC     Note:    Oropharynx: oropharynx clear of all foreign objects and spontaneously breathing  Level of Consciousness: drowsy  Oxygen Supplementation: face mask    Independent Airway: airway patency satisfactory and stable  Dentition: dentition unchanged  Vital Signs Stable: post-procedure vital signs reviewed and stable  Report to RN Given: handoff report given  Patient transferred to: Phase II    Handoff Report: Identifed the Patient, Identified the Reponsible Provider, Reviewed the pertinent medical history, Discussed the surgical course, Reviewed Intra-OP anesthesia mangement and issues during anesthesia, Set expectations for post-procedure period and Allowed opportunity for questions and acknowledgement of understanding      Vitals:  Vitals Value Taken Time   BP     Temp     Pulse     Resp     SpO2         Electronically Signed By: ALTHEA Dawson CRNA  June 20, 2023  2:39 PM

## 2023-09-06 ENCOUNTER — ALLIED HEALTH/NURSE VISIT (OUTPATIENT)
Dept: FAMILY MEDICINE | Facility: CLINIC | Age: 53
End: 2023-09-06
Payer: COMMERCIAL

## 2023-09-06 DIAGNOSIS — Z30.9 ENCOUNTER FOR CONTRACEPTIVE MANAGEMENT, UNSPECIFIED TYPE: Primary | ICD-10-CM

## 2023-09-06 PROCEDURE — 96372 THER/PROPH/DIAG INJ SC/IM: CPT | Performed by: FAMILY MEDICINE

## 2023-09-06 PROCEDURE — 99207 PR NO CHARGE NURSE ONLY: CPT

## 2023-09-06 RX ORDER — MEDROXYPROGESTERONE ACETATE 150 MG/ML
150 INJECTION, SUSPENSION INTRAMUSCULAR
Status: COMPLETED | OUTPATIENT
Start: 2023-09-06 | End: 2024-06-25

## 2023-09-06 RX ADMIN — MEDROXYPROGESTERONE ACETATE 150 MG: 150 INJECTION, SUSPENSION INTRAMUSCULAR at 10:46

## 2023-09-06 NOTE — PROGRESS NOTES

## 2023-09-06 NOTE — NURSING NOTE

## 2023-09-27 ENCOUNTER — TRANSFERRED RECORDS (OUTPATIENT)
Dept: HEALTH INFORMATION MANAGEMENT | Facility: CLINIC | Age: 53
End: 2023-09-27

## 2023-09-29 ENCOUNTER — PATIENT OUTREACH (OUTPATIENT)
Dept: OBGYN | Facility: CLINIC | Age: 53
End: 2023-09-29

## 2023-09-29 DIAGNOSIS — R87.612 PAPANICOLAOU SMEAR OF CERVIX WITH LOW GRADE SQUAMOUS INTRAEPITHELIAL LESION (LGSIL): Primary | ICD-10-CM

## 2023-09-30 ENCOUNTER — HEALTH MAINTENANCE LETTER (OUTPATIENT)
Age: 53
End: 2023-09-30

## 2023-10-10 ENCOUNTER — PATIENT OUTREACH (OUTPATIENT)
Dept: GASTROENTEROLOGY | Facility: CLINIC | Age: 53
End: 2023-10-10

## 2023-10-26 ENCOUNTER — TRANSFERRED RECORDS (OUTPATIENT)
Dept: HEALTH INFORMATION MANAGEMENT | Facility: CLINIC | Age: 53
End: 2023-10-26

## 2023-11-02 ENCOUNTER — TRANSFERRED RECORDS (OUTPATIENT)
Dept: HEALTH INFORMATION MANAGEMENT | Facility: CLINIC | Age: 53
End: 2023-11-02

## 2023-12-05 ENCOUNTER — TRANSFERRED RECORDS (OUTPATIENT)
Dept: HEALTH INFORMATION MANAGEMENT | Facility: CLINIC | Age: 53
End: 2023-12-05

## 2023-12-05 NOTE — TELEPHONE ENCOUNTER
FYI to provider - Patient is lost to pap tracking follow-up. Attempts to contact pt have been made per reminder process and there has been no reply and/or no appt scheduled. Contact hx listed below.     2002, 2004, 2005, 2006 NIL paps  4/18/08 ASCUS, neg HPV  10/2/12 NIL  Above per Care Everywhere  Gap in care/records  7/19/22 LSIL, +HR HPV (not 16/18). Plan: colposcopy due before 10/19/22  10/21/22 [place]: Bx-ALKA 1, ECC-neg. Plan: cotest in 1 year  9/29/23 Reminder mychart  10/31/23 Reminder call -- left message  12/5/23 Lost to follow-up for pap tracking     Teagan Magallanes RN BSN, Pap Tracking

## 2023-12-05 NOTE — TELEPHONE ENCOUNTER
Attempts for follow up regarding pap smear are noted.  Patient has not scheduled and she is considered lost to follow up.  The pap smear tracking episode is closed.  A new episode may be opened should she schedule follow up testing.   Armando Hsieh MD

## 2023-12-26 ENCOUNTER — ALLIED HEALTH/NURSE VISIT (OUTPATIENT)
Dept: FAMILY MEDICINE | Facility: CLINIC | Age: 53
End: 2023-12-26
Payer: COMMERCIAL

## 2023-12-26 VITALS
SYSTOLIC BLOOD PRESSURE: 132 MMHG | HEART RATE: 81 BPM | DIASTOLIC BLOOD PRESSURE: 83 MMHG | BODY MASS INDEX: 41.61 KG/M2 | WEIGHT: 236 LBS

## 2023-12-26 DIAGNOSIS — Z30.42 DEPO-PROVERA CONTRACEPTIVE STATUS: Primary | ICD-10-CM

## 2023-12-26 DIAGNOSIS — Z23 HIGH PRIORITY FOR 2019-NCOV VACCINE: ICD-10-CM

## 2023-12-26 DIAGNOSIS — Z23 NEED FOR PROPHYLACTIC VACCINATION AND INOCULATION AGAINST INFLUENZA: ICD-10-CM

## 2023-12-26 LAB — HCG UR QL: NEGATIVE

## 2023-12-26 PROCEDURE — 99207 PR NO CHARGE NURSE ONLY: CPT

## 2023-12-26 PROCEDURE — 81025 URINE PREGNANCY TEST: CPT

## 2023-12-26 PROCEDURE — 90471 IMMUNIZATION ADMIN: CPT

## 2023-12-26 PROCEDURE — 91320 SARSCV2 VAC 30MCG TRS-SUC IM: CPT

## 2023-12-26 PROCEDURE — 96372 THER/PROPH/DIAG INJ SC/IM: CPT | Performed by: FAMILY MEDICINE

## 2023-12-26 PROCEDURE — 90682 RIV4 VACC RECOMBINANT DNA IM: CPT

## 2023-12-26 PROCEDURE — 90480 ADMN SARSCOV2 VAC 1/ONLY CMP: CPT

## 2023-12-26 RX ADMIN — MEDROXYPROGESTERONE ACETATE 150 MG: 150 INJECTION, SUSPENSION INTRAMUSCULAR at 10:23

## 2023-12-26 NOTE — PROGRESS NOTES
Clinic Administered Medication Documentation      Depo Provera Documentation    Depo-Provera Standing Order inclusion/exclusion criteria reviewed.     Is this the initial or subsequent dose of Depo Provera? Subsequent dose - patient is not within the acceptable window of time (11-15 weeks) for subsequent injection. Pregnancy test is indicated. Pregnancy test result: negative       Patient meets: inclusion criteria     Is there an active order (written within the past 365 days, with administrations remaining, not ) in the chart? Yes.     Prior to injection, verified patient identity using patient's name and date of birth. Medication was administered. Please see MAR and medication order for additional information.     Vial/Syringe: Single dose vial. Was entire vial of medication used? Yes    Patient instructed to remain in clinic for 15 minutes and report any adverse reaction to staff immediately.  NEXT INJECTION DUE: 3/12/24 - 24    Verified that the patient has refills remaining in their prescription.

## 2024-04-08 ENCOUNTER — ALLIED HEALTH/NURSE VISIT (OUTPATIENT)
Dept: FAMILY MEDICINE | Facility: CLINIC | Age: 54
End: 2024-04-08
Payer: COMMERCIAL

## 2024-04-08 DIAGNOSIS — Z30.42 DEPO-PROVERA CONTRACEPTIVE STATUS: Primary | ICD-10-CM

## 2024-04-08 PROCEDURE — 99207 PR NO CHARGE NURSE ONLY: CPT

## 2024-04-08 PROCEDURE — 96372 THER/PROPH/DIAG INJ SC/IM: CPT | Performed by: FAMILY MEDICINE

## 2024-04-08 RX ADMIN — MEDROXYPROGESTERONE ACETATE 150 MG: 150 INJECTION, SUSPENSION INTRAMUSCULAR at 09:25

## 2024-04-08 NOTE — PROGRESS NOTES
Clinic Administered Medication Documentation      Depo Provera Documentation    Depo-Provera Standing Order inclusion/exclusion criteria reviewed.     Is this the initial or subsequent dose of Depo Provera? Subsequent dose - patient is within the acceptable window of time (11-15 weeks) for subsequent injection. Pregnancy test not indicated.    Patient meets: inclusion criteria     Is there an active order (written within the past 365 days, with administrations remaining, not ) in the chart? Yes.     Prior to injection, verified patient identity using patient's name and date of birth. Medication was administered. Please see MAR and medication order for additional information.     Vial/Syringe: Single dose vial. Was entire vial of medication used? Yes    Patient instructed to remain in clinic for 15 minutes and report any adverse reaction to staff immediately.  NEXT INJECTION DUE: 24 - 24    Verified that the patient has refills remaining in their prescription.    Breann Shields MA

## 2024-04-19 ENCOUNTER — TRANSFERRED RECORDS (OUTPATIENT)
Dept: HEALTH INFORMATION MANAGEMENT | Facility: CLINIC | Age: 54
End: 2024-04-19
Payer: COMMERCIAL

## 2024-05-15 ENCOUNTER — PATIENT OUTREACH (OUTPATIENT)
Dept: GASTROENTEROLOGY | Facility: CLINIC | Age: 54
End: 2024-05-15
Payer: COMMERCIAL

## 2024-05-15 DIAGNOSIS — Z12.11 SPECIAL SCREENING FOR MALIGNANT NEOPLASMS, COLON: Primary | ICD-10-CM

## 2024-05-15 NOTE — PROGRESS NOTES
"Patients last colonoscopy on file was on 6/20/23 and was recommended to repeat in 1 year.   [facility] Screening Colonoscopy Referral Review    Patient meets the inclusion criteria for screening colonoscopy standing order.    Ordering/Referring Provider: Jh Marks MD      BMI: Estimated body mass index is 41.61 kg/m  as calculated from the following:    Height as of 7/19/22: 1.604 m (5' 3.15\").    Weight as of 12/26/23: 107 kg (236 lb).     Sedation:  Does patient have any of the following conditions affecting sedation?  No medical conditions affecting sedation.    Previous Scopes:  Any previous recommendations or follow up needs based on previous scope?  na / No recommendations.    Medical Concerns to Postpone Order:  Does patient have any of the following medical concerns that should postpone/delay colonoscopy referral?  No medical conditions affecting colonoscopy referral.    Final Referral Details:  Based on patient's medical history patient is appropriate for referral order with moderate sedation. If patient's BMI > 50 do not schedule in [facility].  "

## 2024-05-29 ENCOUNTER — TELEPHONE (OUTPATIENT)
Dept: GASTROENTEROLOGY | Facility: CLINIC | Age: 54
End: 2024-05-29
Payer: COMMERCIAL

## 2024-05-29 ENCOUNTER — HOSPITAL ENCOUNTER (OUTPATIENT)
Facility: AMBULATORY SURGERY CENTER | Age: 54
End: 2024-05-29
Attending: INTERNAL MEDICINE
Payer: COMMERCIAL

## 2024-05-29 DIAGNOSIS — Z12.11 SPECIAL SCREENING FOR MALIGNANT NEOPLASMS, COLON: Primary | ICD-10-CM

## 2024-05-29 NOTE — TELEPHONE ENCOUNTER
"Previous colonoscopy was completed with MAC, sent to RN for review to double check patient is scheduled correctly.    Endoscopy Scheduling Screen    Have you had a positive Covid test in the last 14 days?  No    What is your communication preference for Instructions and/or Bowel Prep?   MyChart    What insurance is in the chart?  Other:  [facility]    Ordering/Referring Provider: Jh Marks MD     (If ordering provider performs procedure, schedule with ordering provider unless otherwise instructed. )    BMI: Estimated body mass index is 41.61 kg/m  as calculated from the following:    Height as of 7/19/22: 1.604 m (5' 3.15\").    Weight as of 12/26/23: 107 kg (236 lb).     Sedation Ordered  moderate sedation.   If patient BMI > 50 do not schedule in [facility].    If patient BMI > 45 do not schedule at [facility].    Are you taking methadone or Suboxone?  No    Have you had difficulties, pain, or discomfort during past endoscopy procedures?  No    Are you taking any prescription medications for pain 3 or more times per week?   NO, No RN review required.    Do you have a history of malignant hyperthermia?  No    (Females) Are you currently pregnant?   No     Have you been diagnosed or told you have pulmonary hypertension?   No    Do you have an LVAD?  No    Have you been told you have moderate to severe sleep apnea?  No    Have you been told you have COPD, asthma, or any other lung disease?  Yes     What breathing problems do you have?  Asthma     Do you use home oxygen?  No    Have your breathing problems required an ED visit or hospitalization in the last year?  No    Do you have any heart conditions?  No     Have you ever had or are you waiting for an organ transplant?  No. Continue scheduling, no site restrictions.    Have you had a stroke or transient ischemic attack (TIA aka \"mini stroke\" in the last 6 months?   No    Have you been diagnosed with or been told you have cirrhosis of the liver?   No    Are you " "currently on dialysis?   No    Do you need assistance transferring?   No    BMI: Estimated body mass index is 41.61 kg/m  as calculated from the following:    Height as of 7/19/22: 1.604 m (5' 3.15\").    Weight as of 12/26/23: 107 kg (236 lb).     Is patients BMI > 40 and scheduling location [facility]?  No    Do you take an injectable medication for weight loss or diabetes (excluding insulin)?  No    Do you take the medication Naltrexone?  No    Do you take blood thinners?  No       Prep   Are you currently on dialysis or do you have chronic kidney disease?  No    Do you have a diagnosis of diabetes?  No    Do you have a diagnosis of cystic fibrosis (CF)?  No    On a regular basis do you go 3 -5 days between bowel movements?  No    BMI > 40?  Yes (Extended Prep)    Preferred Pharmacy:      [id number] - [facility] [address] [place] [address]  [address]  [facility] [address]  Phone: [phone] Fax: [phone]      Final Scheduling Details     Procedure scheduled  Colonoscopy    Surgeon:  SORAYA     Date of procedure:  7/22     Pre-OP / PAC:   No - Not required for this site.    Location  [facility] - [facility] - Patient preference.    Sedation   Moderate Sedation - Per order.      Patient Reminders:   You will receive a call from a Nurse to review instructions and health history.  This assessment must be completed prior to your procedure.  Failure to complete the Nurse assessment may result in the procedure being cancelled.      On the day of your procedure, please designate an adult(s) who can drive you home stay with you for the next 24 hours. The medicines used in the exam will make you sleepy. You will not be able to drive.      You cannot take public transportation, ride share services, or non-medical taxi service without a responsible caregiver.  Medical transport services are allowed with the requirement that a responsible caregiver will receive you at your destination.  " We require that drivers and caregivers are confirmed prior to your procedure.

## 2024-05-30 ENCOUNTER — TRANSFERRED RECORDS (OUTPATIENT)
Dept: HEALTH INFORMATION MANAGEMENT | Facility: CLINIC | Age: 54
End: 2024-05-30
Payer: COMMERCIAL

## 2024-06-03 SDOH — HEALTH STABILITY: PHYSICAL HEALTH: ON AVERAGE, HOW MANY MINUTES DO YOU ENGAGE IN EXERCISE AT THIS LEVEL?: 20 MIN

## 2024-06-03 SDOH — HEALTH STABILITY: PHYSICAL HEALTH: ON AVERAGE, HOW MANY DAYS PER WEEK DO YOU ENGAGE IN MODERATE TO STRENUOUS EXERCISE (LIKE A BRISK WALK)?: 0 DAYS

## 2024-06-03 ASSESSMENT — ASTHMA QUESTIONNAIRES
ACT_TOTALSCORE: 24
QUESTION_3 LAST FOUR WEEKS HOW OFTEN DID YOUR ASTHMA SYMPTOMS (WHEEZING, COUGHING, SHORTNESS OF BREATH, CHEST TIGHTNESS OR PAIN) WAKE YOU UP AT NIGHT OR EARLIER THAN USUAL IN THE MORNING: NOT AT ALL
QUESTION_4 LAST FOUR WEEKS HOW OFTEN HAVE YOU USED YOUR RESCUE INHALER OR NEBULIZER MEDICATION (SUCH AS ALBUTEROL): NOT AT ALL
QUESTION_1 LAST FOUR WEEKS HOW MUCH OF THE TIME DID YOUR ASTHMA KEEP YOU FROM GETTING AS MUCH DONE AT WORK, SCHOOL OR AT HOME: NONE OF THE TIME
ACT_TOTALSCORE: 24
QUESTION_5 LAST FOUR WEEKS HOW WOULD YOU RATE YOUR ASTHMA CONTROL: WELL CONTROLLED
QUESTION_2 LAST FOUR WEEKS HOW OFTEN HAVE YOU HAD SHORTNESS OF BREATH: NOT AT ALL

## 2024-06-03 ASSESSMENT — SOCIAL DETERMINANTS OF HEALTH (SDOH): HOW OFTEN DO YOU GET TOGETHER WITH FRIENDS OR RELATIVES?: MORE THAN THREE TIMES A WEEK

## 2024-06-06 ENCOUNTER — OFFICE VISIT (OUTPATIENT)
Dept: FAMILY MEDICINE | Facility: CLINIC | Age: 54
End: 2024-06-06
Payer: COMMERCIAL

## 2024-06-06 VITALS
BODY MASS INDEX: 41.11 KG/M2 | HEIGHT: 63 IN | SYSTOLIC BLOOD PRESSURE: 123 MMHG | TEMPERATURE: 98 F | OXYGEN SATURATION: 97 % | DIASTOLIC BLOOD PRESSURE: 70 MMHG | HEART RATE: 92 BPM | WEIGHT: 232 LBS | RESPIRATION RATE: 16 BRPM

## 2024-06-06 DIAGNOSIS — H93.13 TINNITUS, BILATERAL: ICD-10-CM

## 2024-06-06 DIAGNOSIS — Z12.31 ENCOUNTER FOR SCREENING MAMMOGRAM FOR MALIGNANT NEOPLASM OF BREAST: ICD-10-CM

## 2024-06-06 DIAGNOSIS — H91.93 HEARING DEFICIT, BILATERAL: ICD-10-CM

## 2024-06-06 DIAGNOSIS — Z13.6 ENCOUNTER FOR LIPID SCREENING FOR CARDIOVASCULAR DISEASE: ICD-10-CM

## 2024-06-06 DIAGNOSIS — Z12.31 VISIT FOR SCREENING MAMMOGRAM: ICD-10-CM

## 2024-06-06 DIAGNOSIS — F32.A DEPRESSION, UNSPECIFIED DEPRESSION TYPE: ICD-10-CM

## 2024-06-06 DIAGNOSIS — F17.200 NICOTINE DEPENDENCE, UNCOMPLICATED, UNSPECIFIED NICOTINE PRODUCT TYPE: ICD-10-CM

## 2024-06-06 DIAGNOSIS — J45.20 MILD INTERMITTENT ASTHMA WITHOUT COMPLICATION: ICD-10-CM

## 2024-06-06 DIAGNOSIS — Z00.00 WELL ADULT EXAM: Primary | ICD-10-CM

## 2024-06-06 DIAGNOSIS — Z13.220 ENCOUNTER FOR LIPID SCREENING FOR CARDIOVASCULAR DISEASE: ICD-10-CM

## 2024-06-06 DIAGNOSIS — Z87.891 PERSONAL HISTORY OF TOBACCO USE: ICD-10-CM

## 2024-06-06 DIAGNOSIS — R22.40 MASS OF GREAT TOE: ICD-10-CM

## 2024-06-06 DIAGNOSIS — Z23 NEED FOR VACCINATION: ICD-10-CM

## 2024-06-06 LAB
ALBUMIN SERPL BCG-MCNC: 4.8 G/DL (ref 3.5–5.2)
ALP SERPL-CCNC: 99 U/L (ref 40–150)
ALT SERPL W P-5'-P-CCNC: 16 U/L (ref 0–50)
ANION GAP SERPL CALCULATED.3IONS-SCNC: 10 MMOL/L (ref 7–15)
AST SERPL W P-5'-P-CCNC: 16 U/L (ref 0–45)
BILIRUB SERPL-MCNC: 0.4 MG/DL
BUN SERPL-MCNC: 10.6 MG/DL (ref 6–20)
CALCIUM SERPL-MCNC: 9.7 MG/DL (ref 8.6–10)
CHLORIDE SERPL-SCNC: 105 MMOL/L (ref 98–107)
CHOLEST SERPL-MCNC: 207 MG/DL
CREAT SERPL-MCNC: 0.93 MG/DL (ref 0.51–0.95)
DEPRECATED HCO3 PLAS-SCNC: 23 MMOL/L (ref 22–29)
EGFRCR SERPLBLD CKD-EPI 2021: 74 ML/MIN/1.73M2
FASTING STATUS PATIENT QL REPORTED: YES
FASTING STATUS PATIENT QL REPORTED: YES
GLUCOSE SERPL-MCNC: 127 MG/DL (ref 70–99)
HBA1C MFR BLD: 5.6 % (ref 0–5.6)
HDLC SERPL-MCNC: 54 MG/DL
LDLC SERPL CALC-MCNC: 132 MG/DL
NONHDLC SERPL-MCNC: 153 MG/DL
POTASSIUM SERPL-SCNC: 5 MMOL/L (ref 3.4–5.3)
PROT SERPL-MCNC: 7.5 G/DL (ref 6.4–8.3)
SODIUM SERPL-SCNC: 138 MMOL/L (ref 135–145)
TRIGL SERPL-MCNC: 106 MG/DL

## 2024-06-06 PROCEDURE — 83036 HEMOGLOBIN GLYCOSYLATED A1C: CPT | Performed by: FAMILY MEDICINE

## 2024-06-06 PROCEDURE — 90715 TDAP VACCINE 7 YRS/> IM: CPT | Performed by: FAMILY MEDICINE

## 2024-06-06 PROCEDURE — G0296 VISIT TO DETERM LDCT ELIG: HCPCS | Performed by: FAMILY MEDICINE

## 2024-06-06 PROCEDURE — 80053 COMPREHEN METABOLIC PANEL: CPT | Performed by: FAMILY MEDICINE

## 2024-06-06 PROCEDURE — 80061 LIPID PANEL: CPT | Performed by: FAMILY MEDICINE

## 2024-06-06 PROCEDURE — 90471 IMMUNIZATION ADMIN: CPT | Performed by: FAMILY MEDICINE

## 2024-06-06 PROCEDURE — 99213 OFFICE O/P EST LOW 20 MIN: CPT | Mod: 25 | Performed by: FAMILY MEDICINE

## 2024-06-06 PROCEDURE — 36415 COLL VENOUS BLD VENIPUNCTURE: CPT | Performed by: FAMILY MEDICINE

## 2024-06-06 PROCEDURE — 99396 PREV VISIT EST AGE 40-64: CPT | Mod: 25 | Performed by: FAMILY MEDICINE

## 2024-06-06 RX ORDER — ALBUTEROL SULFATE 90 UG/1
2 AEROSOL, METERED RESPIRATORY (INHALATION) EVERY 6 HOURS
Qty: 18 G | Refills: 3 | Status: SHIPPED | OUTPATIENT
Start: 2024-06-06

## 2024-06-06 RX ORDER — NICOTINE 21 MG/24HR
1 PATCH, TRANSDERMAL 24 HOURS TRANSDERMAL EVERY 24 HOURS
COMMUNITY
Start: 2024-07-18

## 2024-06-06 RX ORDER — NICOTINE 21 MG/24HR
1 PATCH, TRANSDERMAL 24 HOURS TRANSDERMAL EVERY 24 HOURS
COMMUNITY
Start: 2024-06-06

## 2024-06-06 NOTE — PATIENT INSTRUCTIONS
Lung Cancer Screening   Frequently Asked Questions  If you are at high-risk for lung cancer, getting screened with low-dose computed tomography (LDCT) every year can help save your life. This handout offers answers to some of the most common questions about lung cancer screening. If you have other questions, please call [phone] ([phone]).     What is it?  Lung cancer screening uses special X-ray technology to create an image of your lung tissue. The exam is quick and easy and takes less than 10 seconds. We don t give you any medicine or use any needles. You can eat before and after the exam. You don t need to change your clothes as long as the clothing on your chest doesn t contain metal. But, you do need to be able to hold your breath for at least 6 seconds during the exam.    What is the goal of lung cancer screening?  The goal of lung cancer screening is to save lives. Many times, lung cancer is not found until a person starts having physical symptoms. Lung cancer screening can help detect lung cancer in the earliest stages when it may be easier to treat.    Who should be screened for lung cancer?  We suggest lung cancer screening for anyone who is at high-risk for lung cancer. You are in the high-risk group if you:      are between the ages of 55 and 79, and    have smoked at least 1 pack of cigarettes a day for 20 or more years, and    still smoke or have quit within the past 15 years.    However, if you have a new cough or shortness of breath, you should talk to your doctor before being screened.    Why does it matter if I have symptoms?  Certain symptoms can be a sign that you have a condition in your lungs that should be checked and treated by your doctor. These symptoms include fever, chest pain, a new or changing cough, shortness of breath that you have never felt before, coughing up blood or unexplained weight loss. Having any of these symptoms can greatly affect the results of lung  cancer screening.       Should all smokers get an LDCT lung cancer screening exam?  It depends. Lung cancer screening is for a very specific group of men and women who have a history of heavy smoking over a long period of time (see  Who should be screened for lung cancer  above).  I am in the high-risk group, but have been diagnosed with cancer in the past. Is LDCT lung cancer screening right for me?  In some cases, you should not have LDCT lung screening, such as when your doctor is already following your cancer with CT scan studies. Your doctor will help you decide if LDCT lung screening is right for you.  Do I need to have a screening exam every year?  Yes. If you are in the high-risk group described earlier, you should get an LDCT lung cancer screening exam every year until you are 79, or are no longer willing or able to undergo screening and possible procedures to diagnose and treat lung cancer.  How effective is LDCT at preventing death from lung cancer?  Studies have shown that LDCT lung cancer screening can lower the risk of death from lung cancer by 20 percent in people who are at high-risk.  What are the risks?  There are some risks and limitations of LDCT lung cancer screening. We want to make sure you understand the risks and benefits, so please let us know if you have any questions. Your doctor may want to talk with you more about these risks.    Radiation exposure: As with any exam that uses radiation, there is a very small increased risk of cancer. The amount of radiation in LDCT is small--about the same amount a person would get from a mammogram. Your doctor orders the exam when he or she feels the potential benefits outweigh the risks.    False negatives: No test is perfect, including LDCT. It is possible that you may have a medical condition, including lung cancer, that is not found during your exam. This is called a false negative result.    False positives and more testing: LDCT very often finds  something in the lung that could be cancer, but in fact is not. This is called a false positive result. False positive tests often cause anxiety. To make sure these findings are not cancer, you may need to have more tests. These tests will be done only if you give us permission. Sometimes patients need a treatment that can have side effects, such as a biopsy. For more information on false positives, see  What can I expect from the results?     Findings not related to lung cancer: Your LDCT exam also takes pictures of areas of your body next to your lungs. In a very small number of cases, the CT scan will show an abnormal finding in one of these areas, such as your kidneys, adrenal glands, liver or thyroid. This finding may not be serious, but you may need more tests. Your doctor can help you decide what other tests you may need, if any.  What can I expect from the results?  About 1 out of 4 LDCT exams will find something that may need more tests. Most of the time, these findings are lung nodules. Lung nodules are very small collections of tissue in the lung. These nodules are very common, and the vast majority--more than 97 percent--are not cancer (benign). Most are normal lymph nodes or small areas of scarring from past infections.  But, if a small lung nodule is found to be cancer, the cancer can be cured more than 90 percent of the time. To know if the nodule is cancer, we may need to get more images before your next yearly screening exam. If the nodule has suspicious features (for example, it is large, has an odd shape or grows over time), we will refer you to a specialist for further testing.  Will my doctor also get the results?  Yes. Your doctor will get a copy of your results.  Is it okay to keep smoking now that there s a cancer screening exam?  No. Tobacco is one of the strongest cancer-causing agents. It causes not only lung cancer, but other cancers and cardiovascular (heart) diseases as well. The damage  caused by smoking builds over time. This means that the longer you smoke, the higher your risk of disease. While it is never too late to quit, the sooner you quit, the better.  Where can I find help to quit smoking?  The best way to prevent lung cancer is to stop smoking. If you have already quit smoking, congratulations and keep it up! For help on quitting smoking, please call [employer] at 5-352-QUITNOW (1-911.801.8971) or the American [employer] Society at 1-517.723.5678 to find local resources near you.  One-on-one health coaching:  If you d prefer to work individually with a health care provider on tobacco cessation, we offer:      Medication Therapy Management:  Our specially trained pharmacists work closely with you and your doctor to help you quit smoking.  Call [phone] or [phone] (toll free).        Nicotine Transdermal System   Habitrol, Nicoderm C-Q    Uses  For quitting smoking.    Instructions  DO NOT take this medicine by mouth.    Avoid placing the patch near the breast.    Remove the patch after 24 hours.    Keep the medicine at room temperature. Avoid heat and direct light.    This patch should not be cut.    Wash your hands before and after handling this medicine.    Remove old patch before applying new one. Change the location of the new patch.    If you have vivid dreams or trouble sleeping, you may remove the patch before going to sleep.    Ask your doctor or pharmacist about locations on your body where this patch can be used.    Remove the plastic liner that protects the sticky side of the patch before applying to the skin.    Be sure the area of skin is clean and dry before putting on a new patch.    Apply the patch to a clean, dry, hairless area.    Press the patch firmly for a few seconds to make sure it stays in place.    After removing the patch, fold it together and discard it out of reach of children and pets.    Please ask your doctor or pharmacist how you can safely dispose of  used patches.    If the skin under the patch becomes irritated, remove the patch. Do not apply a new patch to the area until the skin feels better.    To avoid irritating your skin, use a different location for a new patch.    Apply the patch only to normal looking skin. Avoid areas of the skin that are red, have scrapes, or damaged.    If the patch falls off, apply a new a patch on a different location of the body.    Please tell your doctor and pharmacist about all the medicines you take. Include both prescription and over-the-counter medicines. Also tell them about any vitamins, herbal medicines, or anything else you take for your health.    If you need to stop this medicine, your doctor may wish to gradually reduce the dosage before stopping.    Do not use more than 1 patch at any one time.    Cautions  Tell your doctor and pharmacist if you ever had an allergic reaction to a medicine. Symptoms of an allergic reaction can include trouble breathing, skin rash, itching, swelling, or severe dizziness.    Do not use the medication any more than instructed.    Avoid smoking while on this medicine. Smoking may increase your risk for stroke, heart attack, blood clots, high blood pressure, and other diseases of the heart and blood vessels.    Tell the doctor or pharmacist if you are pregnant, planning to be pregnant, or breastfeeding.    Ask your pharmacist if this medicine can interact with any of your other medicines. Be sure to tell them about all the medicines you take.    Please tell all your doctors and dentists that you are on this medicine before they provide care.    Side Effects  The following is a list of some common side effects from this medicine. Please speak with your doctor about what you should do if you experience these or other side effects.    skin irritation where medicine is applied    If you have any of the following side effects, you may be getting too much medicine. Please contact your doctor to  let them know about these side effects.    diarrhea  dizziness  nausea  rapid heartbeat  vomiting    A few people may have an allergic reaction to this medicine. Symptoms can include difficulty breathing, skin rash, itching, swelling, or severe dizziness. If you notice any of these symptoms, seek medical help quickly.    Extra  Please speak with your doctor, nurse, or pharmacist if you have any questions about this medicine.      [email or web address]  IMPORTANT NOTE: This document tells you briefly how to take your medicine, but it does not tell you all there is to know about it. Your doctor or pharmacist may give you other documents about your medicine. Please talk to them if you have any questions. Always follow their advice. There is a more complete description of this medicine available in English. Scan this code on your smartphone or tablet or use the web address below. You can also ask your pharmacist for a printout. If you have any questions, please ask your pharmacist.   2021 [employer].      6187-6581 [employer]. All rights reserved. This information is not intended as a substitute for professional medical care. Always follow your healthcare professional's instructions.    Nicotine Chewing Gum (NICOTINE GUM - BUCCAL)  For quitting smoking.  Brand Name(s): Nicorelief, Nicorette, Thrive  Generic Name: Nicotine  Instructions  Chew the gum when you feel the urge to smoke. Chew very slowly until it tingles, then move it to the space between your cheek and gum. Keep it there until it stops tingling. When the tingle is gone, begin chewing the gum again until the tingle returns. Most of the nicotine will be gone after 30 minutes.  Do not eat or drink for 15 minutes before or during use of the gum.  Store at room temperature away from heat, light, and moisture. Do not keep in the bathroom.  Tell your doctor and pharmacist about all your medicines. Include  prescription and over-the-counter medicines, vitamins, and herbal medicines.  Keep using this medicine for the full number of days that it is prescribed. Do not stop the medicine even if you start to feel better.  This medicine contains sodium. If you are on a low sodium diet, consider this as part of your total sodium diet.  If you have been told to follow a low sodium diet, speak to your doctor before using this medicine.  Do not take the medicine more than 24 times during 24 hours.  Cautions  Tell your doctor and pharmacist if you ever had an allergic reaction to a medicine.  Do not use the medication any more than instructed.  Avoid smoking while on this medicine. Smoking may increase your risk for stroke, heart attack, blood clots, high blood pressure, and other diseases of the heart and blood vessels.  Tell the doctor or pharmacist if you are pregnant, planning to be pregnant, or breastfeeding.  Please tell all your doctors and dentists that you are on this medicine before they provide care.  Side Effects  The following is a list of some common side effects from this medicine. Please speak with your doctor about what you should do if you experience these or other side effects.  jaw pain  irritation of mouth and gum tissue  If you have any of the following side effects, you may be getting too much medicine. Please contact your doctor to let them know about these side effects.  diarrhea  dizziness  rapid heartbeat  nausea and vomiting  weakness  A few people may have an allergic reaction to this medicine. Symptoms can include difficulty breathing, skin rash, itching, swelling, or severe dizziness. If you notice any of these symptoms, seek medical help quickly.  Please speak with your doctor, nurse, or pharmacist if you have any questions about this medicine.  IMPORTANT NOTE: This document tells you briefly how to take your medicine, but it does not tell you all there is to know about it. Your doctor or  pharmacist may give you other documents about your medicine. Please talk to them if you have any questions. Always follow their advice.  There is a more complete description of this medicine available in English. Scan this code on your smartphone or tablet or use the web address below. You can also ask your pharmacist for a printout. If you have any questions, please ask your pharmacist.  The display and use of this drug information is subject to Terms of Use.  More information about NICOTINE GUM - BUCCAL      Copyright(c) 2023 [employer]  Selected from data included with permission and copyright by [employer]. This copyrighted material has been downloaded from a licensed data provider and is not for distribution, except as may be authorized by the applicable terms of use.  Conditions of Use: The information in this database is intended to supplement, not substitute for the expertise and judgment of healthcare professionals. The information is not intended to cover all possible uses, directions, precautions, drug interactions or adverse effects nor should it be construed to indicate that use of a particular drug is safe, appropriate or effective for you or anyone else. A healthcare professional should be consulted before taking any drug, changing any diet or commencing or discontinuing any course of treatment. The display and use of this drug information is subject to express Terms of Use.  Care instructions adapted under license by your healthcare professional. If you have questions about a medical condition or this instruction, always ask your healthcare professional. [employer] disclaims any warranty or liability for your use of this information.        Nicotine (Nicorette) Oral Lozenge     Uses  For quitting smoking.    Instructions  Dissolve the tablet completely in your mouth, and swallow the medicine as it dissolves. Do not chew or swallow the tablet whole.    Change the  location of the medicine in the mouth with each dose to avoid irritation.    Do not eat or drink for 15 minutes before and while using this medicine.    Store at room temperature in a dry place. Do not keep in the bathroom.    Keep the medicine away from heat and light.    Please tell your doctor and pharmacist about all the medicines you take. Include both prescription and over-the-counter medicines. Also tell them about any vitamins, herbal medicines, or anything else you take for your health.    If your symptoms do not improve or they worsen while on this medicine, contact your doctor.    It is very important that you continue using this medicine for the full number of days that it is prescribed. Please do not stop the medicine even if you start to feel better after the first few days.    This medicine contains sodium. If you are on a low sodium diet, consider this as part of your total sodium diet.    Do not use more than 20 doses in 24 hours.    Cautions  Tell your doctor and pharmacist if you ever had an allergic reaction to a medicine. Symptoms of an allergic reaction can include trouble breathing, skin rash, itching, swelling, or severe dizziness.    Do not use the medication any more than instructed.    Avoid smoking while on this medicine. Smoking may increase your risk for stroke, heart attack, blood clots, high blood pressure, and other diseases of the heart and blood vessels.    Tell the doctor or pharmacist if you are pregnant, planning to be pregnant, or breastfeeding.    This medicine may contain aspartame (phenylalanine). Check with your doctor or pharmacist if you have a medical condition that requires you to limit or avoid this ingredient.    Ask your pharmacist if this medicine can interact with any of your other medicines. Be sure to tell them about all the medicines you take.    Please tell all your doctors and dentists that you are on this medicine before they provide care.    Do not start or  stop any other medicines without first speaking to your doctor or pharmacist.    Side Effects  The following is a list of some common side effects from this medicine. Please speak with your doctor about what you should do if you experience these or other side effects.    headaches  hiccups  mouth sores or irritation  nausea  sore throat  stomach upset or abdominal pain    Call your doctor or get medical help right away if you notice any of these more serious side effects:    chest pain  confusion  dizziness  swelling of the legs, feet, and hands  severe or persistent headache  fast or irregular heart beats  mood changes  feeling of numbness or tingling in your hands and feet  slurred speech  weakness on one side of the body    A few people may have an allergic reaction to this medicine. Symptoms can include difficulty breathing, skin rash, itching, swelling, or severe dizziness. If you notice any of these symptoms, seek medical help quickly.    Extra  Please speak with your doctor, nurse, or pharmacist if you have any questions about this medicine.        [email or web address]  IMPORTANT NOTE: This document tells you briefly how to take your medicine, but it does not tell you all there is to know about it. Your doctor or pharmacist may give you other documents about your medicine. Please talk to them if you have any questions. Always follow their advice. There is a more complete description of this medicine available in English. Scan this code on your smartphone or tablet or use the web address below. You can also ask your pharmacist for a printout. If you have any questions, please ask your pharmacist.   2021 [email or web address].      6428-5002 [employer]. All rights reserved. This information is not intended as a substitute for professional medical care. Always follow your healthcare professional's instructions.  Quitting Tobacco: Care Instructions  Quitting tobacco is much  harder than simply changing a habit. Nicotine cravings make it hard to quit, but you can do it. Your doctor will help you set up the plan that best meets your needs.    You will miss the nicotine at first. You may feel short-tempered and grumpy. You may have trouble sleeping or thinking clearly. The urge to use tobacco may continue for a time.    Combining tools can raise your chances of success. You can use medicine along with counseling. And you can join a quit-tobacco program, such as the American Lung Association's Freedom from Smoking program.    Get support.  Reach out to family and friends, and find a counselor to help you quit. Join a support group, such as Nicotine Anonymous. Go to www.smokefree.gov to sign up for text messaging support.     Talk to your doctor or pharmacist about medicines that can help you quit.  Medicines can help with cravings and withdrawal symptoms. There are several over-the-counter choices, such as nicotine patches, gum, and lozenges.     After you quit, do not use tobacco again, not even once.  Get rid of all tobacco products and anything that reminds you of tobacco, such as ashtrays.     Avoid things that make you reach for tobacco.  Change your daily routine. Take a different route to work, or eat a meal in a different place.     Try to cut down on stress.  Find ways to calm yourself, such as taking a hot bath or doing deep breathing exercises.     Eat a healthy diet, and get regular exercise.  Having healthy habits may help you quit using tobacco.     Don't give up on quitting if you use tobacco again.  Most people quit and restart a few times before they quit for good.   Follow-up care is a key part of your treatment and safety. Be sure to make and go to all appointments, and call your doctor if you are having problems. It's also a good idea to know your test results and keep a list of the medicines you take.  Where can you learn more?  Go to  "https://www.[employer].net/patiented  Enter Y522 in the search box to learn more about \"Quitting Tobacco: Care Instructions.\"  Current as of: November 15, 2023               Content Version:  [employer].   Care instructions adapted under license by your healthcare professional. If you have questions about a medical condition or this instruction, always ask your healthcare professional. [employer] disclaims any warranty or liability for your use of this information.      Learning About Benefits of Quitting Smoking  Quitting smoking helps your body in many ways. Quitting lowers your risk for cancer, lung disease, heart attack, stroke, blood vessel disease, and blindness. You'll also get sick less often and heal faster. And after you quit, you may find that your mood is better and you are less stressed.  When and how will you feel healthier after quitting smoking?    In the first hours or days:    Your blood pressure and heart rate go down.  Your oxygen levels increase.    Within weeks or months:    You will cough less and breathe deeper. It may be easier to be active.  Your sense of taste and smell should return.    Over the years:    Your risks of heart disease, heart attack, and stroke are lower.  Your risk of lung cancer is cut by about half after about 10 years. And your risk for other cancers is lower too.  How would quitting help others in your life?    Their heart, lung, and cancer risks may drop, much like yours. They will also be sick less.   If you are or will be pregnant someday, quitting smoking means a healthier .   Where can you learn more?  Go to https://www.[employer].net/patiented  Enter O319 in the search box to learn more about \"Learning About Benefits of Quitting Smoking.\"  Current as of: November 15, 2023               Content Version:  [employer].   Care instructions adapted under license by your healthcare " professional. If you have questions about a medical condition or this instruction, always ask your healthcare professional. [employer] disclaims any warranty or liability for your use of this information.

## 2024-06-06 NOTE — PROGRESS NOTES
"Preventive Care Visit  [facility] BRI Avalos MD, Family Medicine  Jun 6, 2024      Assessment & Plan       ICD-10-CM    1. Visit for screening mammogram  Z12.31 MA Screening Bilateral w/ Jordan      2. Mass of great toe  R22.40 Adult Dermatology  Referral      3. Depression, unspecified depression type  F32.A Adult Mental Health  Referral      4. Hearing deficit, bilateral  H91.93 Adult Audiology  Referral      5. Tinnitus, bilateral  H93.13 Adult Audiology  Referral      6. Encounter for screening mammogram for malignant neoplasm of breast  Z12.31       7. Personal history of tobacco use  Z87.891 Prof fee: Shared Decision Making for Lung Cancer Screening     CT Chest Lung Cancer Scrn Low Dose wo      8. Nicotine dependence, uncomplicated, unspecified nicotine product type  F17.200 [place] Quit Partner Referral     SMOKING CESSATION COUNSELING 3-10 MIN      9. Need for vaccination  Z23       10. Well adult exam  Z00.00 Hemoglobin A1c     Lipid panel reflex to direct LDL Fasting     Comprehensive metabolic panel      11. Encounter for lipid screening for cardiovascular disease  Z13.220 Lipid panel reflex to direct LDL Fasting    Z13.6       12. Mild intermittent asthma without complication  J45.20 albuterol (PROAIR HFA/PROVENTIL HFA/VENTOLIN HFA) 108 (90 Base) MCG/ACT inhaler        Weight loss cara - \"lose it!\"  Low impact workouts    Patient has been advised of split billing requirements and indicates understanding: Yes        Nicotine/Tobacco Cessation  She reports that she has been smoking cigarettes. She has a 30 pack-year smoking history. She has never used smokeless tobacco.  Nicotine/Tobacco Cessation Plan  Phone counseling: Place order for Quit Partner Referral      BMI  Estimated body mass index is 40.9 kg/m  as calculated from the following:    Height as of this encounter: 1.604 m (5' 3.15\").    Weight as of this encounter: 105.2 kg (232 lb). "   Weight management plan: Discussed healthy diet and exercise guidelines    Counseling  Appropriate preventive services were discussed with this patient, including applicable screening as appropriate for fall prevention, nutrition, physical activity, Tobacco-use cessation, weight loss and cognition.  Checklist reviewing preventive services available has been given to the patient.  Reviewed patient's diet, addressing concerns and/or questions.   The patient was instructed to see the dentist every 6 months.       Patient Instructions   Lung Cancer Screening   Frequently Asked Questions  If you are at high-risk for lung cancer, getting screened with low-dose computed tomography (LDCT) every year can help save your life. This handout offers answers to some of the most common questions about lung cancer screening. If you have other questions, please call [phone] ([phone]).     What is it?  Lung cancer screening uses special X-ray technology to create an image of your lung tissue. The exam is quick and easy and takes less than 10 seconds. We don t give you any medicine or use any needles. You can eat before and after the exam. You don t need to change your clothes as long as the clothing on your chest doesn t contain metal. But, you do need to be able to hold your breath for at least 6 seconds during the exam.    What is the goal of lung cancer screening?  The goal of lung cancer screening is to save lives. Many times, lung cancer is not found until a person starts having physical symptoms. Lung cancer screening can help detect lung cancer in the earliest stages when it may be easier to treat.    Who should be screened for lung cancer?  We suggest lung cancer screening for anyone who is at high-risk for lung cancer. You are in the high-risk group if you:     are between the ages of 55 and 79, and   have smoked at least 1 pack of cigarettes a day for 20 or more years, and   still smoke or have quit within  the past 15 years.    However, if you have a new cough or shortness of breath, you should talk to your doctor before being screened.    Why does it matter if I have symptoms?  Certain symptoms can be a sign that you have a condition in your lungs that should be checked and treated by your doctor. These symptoms include fever, chest pain, a new or changing cough, shortness of breath that you have never felt before, coughing up blood or unexplained weight loss. Having any of these symptoms can greatly affect the results of lung cancer screening.       Should all smokers get an LDCT lung cancer screening exam?  It depends. Lung cancer screening is for a very specific group of men and women who have a history of heavy smoking over a long period of time (see  Who should be screened for lung cancer  above).  I am in the high-risk group, but have been diagnosed with cancer in the past. Is LDCT lung cancer screening right for me?  In some cases, you should not have LDCT lung screening, such as when your doctor is already following your cancer with CT scan studies. Your doctor will help you decide if LDCT lung screening is right for you.  Do I need to have a screening exam every year?  Yes. If you are in the high-risk group described earlier, you should get an LDCT lung cancer screening exam every year until you are 79, or are no longer willing or able to undergo screening and possible procedures to diagnose and treat lung cancer.  How effective is LDCT at preventing death from lung cancer?  Studies have shown that LDCT lung cancer screening can lower the risk of death from lung cancer by 20 percent in people who are at high-risk.  What are the risks?  There are some risks and limitations of LDCT lung cancer screening. We want to make sure you understand the risks and benefits, so please let us know if you have any questions. Your doctor may want to talk with you more about these risks.   Radiation exposure: As with any exam  that uses radiation, there is a very small increased risk of cancer. The amount of radiation in LDCT is small--about the same amount a person would get from a mammogram. Your doctor orders the exam when he or she feels the potential benefits outweigh the risks.   False negatives: No test is perfect, including LDCT. It is possible that you may have a medical condition, including lung cancer, that is not found during your exam. This is called a false negative result.   False positives and more testing: LDCT very often finds something in the lung that could be cancer, but in fact is not. This is called a false positive result. False positive tests often cause anxiety. To make sure these findings are not cancer, you may need to have more tests. These tests will be done only if you give us permission. Sometimes patients need a treatment that can have side effects, such as a biopsy. For more information on false positives, see  What can I expect from the results?    Findings not related to lung cancer: Your LDCT exam also takes pictures of areas of your body next to your lungs. In a very small number of cases, the CT scan will show an abnormal finding in one of these areas, such as your kidneys, adrenal glands, liver or thyroid. This finding may not be serious, but you may need more tests. Your doctor can help you decide what other tests you may need, if any.  What can I expect from the results?  About 1 out of 4 LDCT exams will find something that may need more tests. Most of the time, these findings are lung nodules. Lung nodules are very small collections of tissue in the lung. These nodules are very common, and the vast majority--more than 97 percent--are not cancer (benign). Most are normal lymph nodes or small areas of scarring from past infections.  But, if a small lung nodule is found to be cancer, the cancer can be cured more than 90 percent of the time. To know if the nodule is cancer, we may need to get more  images before your next yearly screening exam. If the nodule has suspicious features (for example, it is large, has an odd shape or grows over time), we will refer you to a specialist for further testing.  Will my doctor also get the results?  Yes. Your doctor will get a copy of your results.  Is it okay to keep smoking now that there s a cancer screening exam?  No. Tobacco is one of the strongest cancer-causing agents. It causes not only lung cancer, but other cancers and cardiovascular (heart) diseases as well. The damage caused by smoking builds over time. This means that the longer you smoke, the higher your risk of disease. While it is never too late to quit, the sooner you quit, the better.  Where can I find help to quit smoking?  The best way to prevent lung cancer is to stop smoking. If you have already quit smoking, congratulations and keep it up! For help on quitting smoking, please call [employer] at 7-451-QUITNOW (1-406.458.9095) or the American Cancer Society at 1-514.141.3651 to find local resources near you.  One-on-one health coaching:  If you d prefer to work individually with a health care provider on tobacco cessation, we offer:     Medication Therapy Management:  Our specially trained pharmacists work closely with you and your doctor to help you quit smoking.  Call [phone] or [phone] (toll free).        Nicotine Transdermal System   Habitrol, Nicoderm C-Q    Uses  For quitting smoking.    Instructions  DO NOT take this medicine by mouth.    Avoid placing the patch near the breast.    Remove the patch after 24 hours.    Keep the medicine at room temperature. Avoid heat and direct light.    This patch should not be cut.    Wash your hands before and after handling this medicine.    Remove old patch before applying new one. Change the location of the new patch.    If you have vivid dreams or trouble sleeping, you may remove the patch before going to sleep.    Ask your doctor or  pharmacist about locations on your body where this patch can be used.    Remove the plastic liner that protects the sticky side of the patch before applying to the skin.    Be sure the area of skin is clean and dry before putting on a new patch.    Apply the patch to a clean, dry, hairless area.    Press the patch firmly for a few seconds to make sure it stays in place.    After removing the patch, fold it together and discard it out of reach of children and pets.    Please ask your doctor or pharmacist how you can safely dispose of used patches.    If the skin under the patch becomes irritated, remove the patch. Do not apply a new patch to the area until the skin feels better.    To avoid irritating your skin, use a different location for a new patch.    Apply the patch only to normal looking skin. Avoid areas of the skin that are red, have scrapes, or damaged.    If the patch falls off, apply a new a patch on a different location of the body.    Please tell your doctor and pharmacist about all the medicines you take. Include both prescription and over-the-counter medicines. Also tell them about any vitamins, herbal medicines, or anything else you take for your health.    If you need to stop this medicine, your doctor may wish to gradually reduce the dosage before stopping.    Do not use more than 1 patch at any one time.    Cautions  Tell your doctor and pharmacist if you ever had an allergic reaction to a medicine. Symptoms of an allergic reaction can include trouble breathing, skin rash, itching, swelling, or severe dizziness.    Do not use the medication any more than instructed.    Avoid smoking while on this medicine. Smoking may increase your risk for stroke, heart attack, blood clots, high blood pressure, and other diseases of the heart and blood vessels.    Tell the doctor or pharmacist if you are pregnant, planning to be pregnant, or breastfeeding.    Ask your pharmacist if this medicine can interact with  any of your other medicines. Be sure to tell them about all the medicines you take.    Please tell all your doctors and dentists that you are on this medicine before they provide care.    Side Effects  The following is a list of some common side effects from this medicine. Please speak with your doctor about what you should do if you experience these or other side effects.    skin irritation where medicine is applied    If you have any of the following side effects, you may be getting too much medicine. Please contact your doctor to let them know about these side effects.    diarrhea  dizziness  nausea  rapid heartbeat  vomiting    A few people may have an allergic reaction to this medicine. Symptoms can include difficulty breathing, skin rash, itching, swelling, or severe dizziness. If you notice any of these symptoms, seek medical help quickly.    Extra  Please speak with your doctor, nurse, or pharmacist if you have any questions about this medicine.      [email or web address]  IMPORTANT NOTE: This document tells you briefly how to take your medicine, but it does not tell you all there is to know about it. Your doctor or pharmacist may give you other documents about your medicine. Please talk to them if you have any questions. Always follow their advice. There is a more complete description of this medicine available in English. Scan this code on your smartphone or tablet or use the web address below. You can also ask your pharmacist for a printout. If you have any questions, please ask your pharmacist.   2021 [employer].      5367-3625 [employer]. All rights reserved. This information is not intended as a substitute for professional medical care. Always follow your healthcare professional's instructions.    Nicotine Chewing Gum (NICOTINE GUM - BUCCAL)  For quitting smoking.  Brand Name(s): Nicorelief, Nicorette, Thrive  Generic Name: Nicotine  Instructions  Chew  the gum when you feel the urge to smoke. Chew very slowly until it tingles, then move it to the space between your cheek and gum. Keep it there until it stops tingling. When the tingle is gone, begin chewing the gum again until the tingle returns. Most of the nicotine will be gone after 30 minutes.  Do not eat or drink for 15 minutes before or during use of the gum.  Store at room temperature away from heat, light, and moisture. Do not keep in the bathroom.  Tell your doctor and pharmacist about all your medicines. Include prescription and over-the-counter medicines, vitamins, and herbal medicines.  Keep using this medicine for the full number of days that it is prescribed. Do not stop the medicine even if you start to feel better.  This medicine contains sodium. If you are on a low sodium diet, consider this as part of your total sodium diet.  If you have been told to follow a low sodium diet, speak to your doctor before using this medicine.  Do not take the medicine more than 24 times during 24 hours.  Cautions  Tell your doctor and pharmacist if you ever had an allergic reaction to a medicine.  Do not use the medication any more than instructed.  Avoid smoking while on this medicine. Smoking may increase your risk for stroke, heart attack, blood clots, high blood pressure, and other diseases of the heart and blood vessels.  Tell the doctor or pharmacist if you are pregnant, planning to be pregnant, or breastfeeding.  Please tell all your doctors and dentists that you are on this medicine before they provide care.  Side Effects  The following is a list of some common side effects from this medicine. Please speak with your doctor about what you should do if you experience these or other side effects.  jaw pain  irritation of mouth and gum tissue  If you have any of the following side effects, you may be getting too much medicine. Please contact your doctor to let them know about these side  effects.  diarrhea  dizziness  rapid heartbeat  nausea and vomiting  weakness  A few people may have an allergic reaction to this medicine. Symptoms can include difficulty breathing, skin rash, itching, swelling, or severe dizziness. If you notice any of these symptoms, seek medical help quickly.  Please speak with your doctor, nurse, or pharmacist if you have any questions about this medicine.  IMPORTANT NOTE: This document tells you briefly how to take your medicine, but it does not tell you all there is to know about it. Your doctor or pharmacist may give you other documents about your medicine. Please talk to them if you have any questions. Always follow their advice.  There is a more complete description of this medicine available in English. Scan this code on your smartphone or tablet or use the web address below. You can also ask your pharmacist for a printout. If you have any questions, please ask your pharmacist.  The display and use of this drug information is subject to Terms of Use.  More information about NICOTINE GUM - BUCCAL      Copyright(c) 2023 [employer]  Selected from data included with permission and copyright by [employer]. This copyrighted material has been downloaded from a licensed data provider and is not for distribution, except as may be authorized by the applicable terms of use.  Conditions of Use: The information in this database is intended to supplement, not substitute for the expertise and judgment of healthcare professionals. The information is not intended to cover all possible uses, directions, precautions, drug interactions or adverse effects nor should it be construed to indicate that use of a particular drug is safe, appropriate or effective for you or anyone else. A healthcare professional should be consulted before taking any drug, changing any diet or commencing or discontinuing any course of treatment. The display and use of this drug information is  subject to express Terms of Use.  Care instructions adapted under license by your healthcare professional. If you have questions about a medical condition or this instruction, always ask your healthcare professional. [employer] disclaims any warranty or liability for your use of this information.        Nicotine (Nicorette) Oral Lozenge     Uses  For quitting smoking.    Instructions  Dissolve the tablet completely in your mouth, and swallow the medicine as it dissolves. Do not chew or swallow the tablet whole.    Change the location of the medicine in the mouth with each dose to avoid irritation.    Do not eat or drink for 15 minutes before and while using this medicine.    Store at room temperature in a dry place. Do not keep in the bathroom.    Keep the medicine away from heat and light.    Please tell your doctor and pharmacist about all the medicines you take. Include both prescription and over-the-counter medicines. Also tell them about any vitamins, herbal medicines, or anything else you take for your health.    If your symptoms do not improve or they worsen while on this medicine, contact your doctor.    It is very important that you continue using this medicine for the full number of days that it is prescribed. Please do not stop the medicine even if you start to feel better after the first few days.    This medicine contains sodium. If you are on a low sodium diet, consider this as part of your total sodium diet.    Do not use more than 20 doses in 24 hours.    Cautions  Tell your doctor and pharmacist if you ever had an allergic reaction to a medicine. Symptoms of an allergic reaction can include trouble breathing, skin rash, itching, swelling, or severe dizziness.    Do not use the medication any more than instructed.    Avoid smoking while on this medicine. Smoking may increase your risk for stroke, heart attack, blood clots, high blood pressure, and other diseases of the heart and blood  vessels.    Tell the doctor or pharmacist if you are pregnant, planning to be pregnant, or breastfeeding.    This medicine may contain aspartame (phenylalanine). Check with your doctor or pharmacist if you have a medical condition that requires you to limit or avoid this ingredient.    Ask your pharmacist if this medicine can interact with any of your other medicines. Be sure to tell them about all the medicines you take.    Please tell all your doctors and dentists that you are on this medicine before they provide care.    Do not start or stop any other medicines without first speaking to your doctor or pharmacist.    Side Effects  The following is a list of some common side effects from this medicine. Please speak with your doctor about what you should do if you experience these or other side effects.    headaches  hiccups  mouth sores or irritation  nausea  sore throat  stomach upset or abdominal pain    Call your doctor or get medical help right away if you notice any of these more serious side effects:    chest pain  confusion  dizziness  swelling of the legs, feet, and hands  severe or persistent headache  fast or irregular heart beats  mood changes  feeling of numbness or tingling in your hands and feet  slurred speech  weakness on one side of the body    A few people may have an allergic reaction to this medicine. Symptoms can include difficulty breathing, skin rash, itching, swelling, or severe dizziness. If you notice any of these symptoms, seek medical help quickly.    Extra  Please speak with your doctor, nurse, or pharmacist if you have any questions about this medicine.        [email or web address]  IMPORTANT NOTE: This document tells you briefly how to take your medicine, but it does not tell you all there is to know about it. Your doctor or pharmacist may give you other documents about your medicine. Please talk to them if you have any questions. Always follow their advice.  There is a more complete description of this medicine available in English. Scan this code on your smartphone or tablet or use the web address below. You can also ask your pharmacist for a printout. If you have any questions, please ask your pharmacist.   2021 [employer].      [phone] [employer]. All rights reserved. This information is not intended as a substitute for professional medical care. Always follow your healthcare professional's instructions.  Quitting Tobacco: Care Instructions  Quitting tobacco is much harder than simply changing a habit. Nicotine cravings make it hard to quit, but you can do it. Your doctor will help you set up the plan that best meets your needs.    You will miss the nicotine at first. You may feel short-tempered and grumpy. You may have trouble sleeping or thinking clearly. The urge to use tobacco may continue for a time.    Combining tools can raise your chances of success. You can use medicine along with counseling. And you can join a quit-tobacco program, such as the American Lung Association's Freedom from Smoking program.    Get support.  Reach out to family and friends, and find a counselor to help you quit. Join a support group, such as Nicotine Anonymous. Go to www.smokefree.gov to sign up for text messaging support.     Talk to your doctor or pharmacist about medicines that can help you quit.  Medicines can help with cravings and withdrawal symptoms. There are several over-the-counter choices, such as nicotine patches, gum, and lozenges.     After you quit, do not use tobacco again, not even once.  Get rid of all tobacco products and anything that reminds you of tobacco, such as ashtrays.     Avoid things that make you reach for tobacco.  Change your daily routine. Take a different route to work, or eat a meal in a different place.     Try to cut down on stress.  Find ways to calm yourself, such as taking a hot bath or doing deep breathing  "exercises.     Eat a healthy diet, and get regular exercise.  Having healthy habits may help you quit using tobacco.     Don't give up on quitting if you use tobacco again.  Most people quit and restart a few times before they quit for good.   Follow-up care is a key part of your treatment and safety. Be sure to make and go to all appointments, and call your doctor if you are having problems. It's also a good idea to know your test results and keep a list of the medicines you take.  Where can you learn more?  Go to https://www.[employer].net/patiented  Enter Y522 in the search box to learn more about \"Quitting Tobacco: Care Instructions.\"  Current as of: November 15, 2023               Content Version: 14.0    [id number] [employer].   Care instructions adapted under license by your healthcare professional. If you have questions about a medical condition or this instruction, always ask your healthcare professional. [employer] disclaims any warranty or liability for your use of this information.      Learning About Benefits of Quitting Smoking  Quitting smoking helps your body in many ways. Quitting lowers your risk for cancer, lung disease, heart attack, stroke, blood vessel disease, and blindness. You'll also get sick less often and heal faster. And after you quit, you may find that your mood is better and you are less stressed.  When and how will you feel healthier after quitting smoking?    In the first hours or days:    Your blood pressure and heart rate go down.  Your oxygen levels increase.    Within weeks or months:    You will cough less and breathe deeper. It may be easier to be active.  Your sense of taste and smell should return.    Over the years:    Your risks of heart disease, heart attack, and stroke are lower.  Your risk of lung cancer is cut by about half after about 10 years. And your risk for other cancers is lower too.  How would quitting help others in your " "life?    Their heart, lung, and cancer risks may drop, much like yours. They will also be sick less.   If you are or will be pregnant someday, quitting smoking means a healthier .   Where can you learn more?  Go to https://www.[employer].net/patiented  Enter O319 in the search box to learn more about \"Learning About Benefits of Quitting Smoking.\"  Current as of: November 15, 2023               Content Version: 14.0    [id number] [employer].   Care instructions adapted under license by your healthcare professional. If you have questions about a medical condition or this instruction, always ask your healthcare professional. [employer] disclaims any warranty or liability for your use of this information.         Angel Camacho is a 53 year old, presenting for the following:  Physical        2024     6:52 AM   Additional Questions   Roomed by Erica   Accompanied by none         2024     6:52 AM   Patient Reported Additional Medications   Patient reports taking the following new medications none       Health Care Directive  Patient does not have a Health Care Directive or Living Will: Discussed advance care planning with patient; however, patient declined at this time.    HPI    Chronic hip pain  due to labrum tear  Plans to see orthopedics  Limiting exercise and ability to lose weight  Affecting mood    Growth on left big toe  Since she was about 21yrs old  Getting larger    Hearing deficit  Ringing in the ears  History of ear tubes    Help with weight loss    Asthma  Needs medication refill    Wt Readings from Last 4 Encounters:   24 105.2 kg (232 lb)   23 107 kg (236 lb)   10/21/22 98.4 kg (217 lb)   22 98.8 kg (217 lb 12.8 oz)     BP Readings from Last 6 Encounters:   24 123/70   23 132/83   23 101/80   10/28/22 (!) 128/92   10/21/22 135/85   22 133/83                       6/3/2024   General Health   How would you rate your " overall physical health? (!) FAIR   Feel stress (tense, anxious, or unable to sleep) To some extent   (!) STRESS CONCERN      6/3/2024   Nutrition   Three or more servings of calcium each day? (!) NO   Diet: Regular (no restrictions)   How many servings of fruit and vegetables per day? (!) 2-3   How many sweetened beverages each day? (!) 3         6/3/2024   Exercise   Days per week of moderate/strenous exercise 0 days   Average minutes spent exercising at this level 20 min   (!) EXERCISE CONCERN      6/3/2024   Social Factors   Frequency of gathering with friends or relatives More than three times a week   Worry food won't last until get money to buy more No   Food not last or not have enough money for food? No   Do you have housing?  Yes   Are you worried about losing your housing? No   Lack of transportation? No   Unable to get utilities (heat,electricity)? No         6/3/2024   Fall Risk   Fallen 2 or more times in the past year? No   Trouble with walking or balance? Yes           6/3/2024   Dental   Dentist two times every year? (!) NO         6/3/2024   TB Screening   Were you born outside of the [place]? No         Today's PHQ-2 Score:       6/5/2024    10:12 AM   PHQ-2 ( 1999 [employer])   Q1: Little interest or pleasure in doing things 0   Q2: Feeling down, depressed or hopeless 0   PHQ-2 Score 0   Q1: Little interest or pleasure in doing things Not at all   Q2: Feeling down, depressed or hopeless Not at all   PHQ-2 Score 0           6/3/2024   Substance Use   Alcohol more than 3/day or more than 7/wk No   Do you use any other substances recreationally? No     Social History     Tobacco Use    Smoking status: Every Day     Current packs/day: 1.00     Average packs/day: 1 pack/day for 30.0 years (30.0 ttl pk-yrs)     Types: Cigarettes    Smokeless tobacco: Never    Tobacco comments:     I ve tried quitting numerous times. Only thing that ever worked was pregnancy.   Substance Use Topics    Alcohol use: Yes      "Alcohol/week: 4.0 standard drinks of alcohol     Types: 4 Cans of beer per week     Comment: 4 drinks a week    Drug use: Yes     Types: Marijuana     Comment: Helps my back pain           6/7/2022   LAST FHS-7 RESULTS   1st degree relative breast or ovarian cancer No   Any relative bilateral breast cancer No   Any male have breast cancer No   Any ONE woman have BOTH breast AND ovarian cancer No   Any woman with breast cancer before 50yrs No   2 or more relatives with breast AND/OR ovarian cancer No   2 or more relatives with breast AND/OR bowel cancer No                  6/3/2024   One time HIV Screening   Previous HIV test? Yes         6/3/2024   STI Screening   New sexual partner(s) since last STI/HIV test? No     History of abnormal Pap smear: YES - reflected in Problem List and Health Maintenance accordingly        Latest Ref Rng & Units 7/19/2022     3:18 PM   PAP / HPV   PAP  Low-grade squamous intraepithelial lesion (LSIL) encompassing HPV/mild dysplasia/CIN1    HPV 16 DNA Negative Negative    HPV 18 DNA Negative Negative    Other HR HPV Negative Positive      ASCVD Risk   The 10-year ASCVD risk score (Flaquita MOROCHO, et al., 2019) is: 3%    Values used to calculate the score:      Age: 53 years      Sex: Female      Is Non- : No      Diabetic: No      Tobacco smoker: Yes      Systolic Blood Pressure: 123 mmHg      Is BP treated: No      HDL Cholesterol: 79 mg/dL      Total Cholesterol: 235 mg/dL           Reviewed and updated as needed this visit by Provider                             Objective    Exam  /70   Pulse 92   Temp 98  F (36.7  C) (Oral)   Resp 16   Ht 1.604 m (5' 3.15\")   Wt 105.2 kg (232 lb)   SpO2 97%   BMI 40.90 kg/m     Estimated body mass index is 40.9 kg/m  as calculated from the following:    Height as of this encounter: 1.604 m (5' 3.15\").    Weight as of this encounter: 105.2 kg (232 lb).    Physical Exam  Constitutional:       General: She is " not in acute distress.     Appearance: Normal appearance. She is not ill-appearing.   HENT:      Head: Normocephalic and atraumatic.      Right Ear: Ear canal normal.      Left Ear: Tympanic membrane and ear canal normal.      Ears:      Comments: Segmentation/scarring of right TM     Nose: Nose normal.      Mouth/Throat:      Mouth: Mucous membranes are moist.      Pharynx: Oropharynx is clear.   Eyes:      General: No scleral icterus.     Extraocular Movements: Extraocular movements intact.   Cardiovascular:      Rate and Rhythm: Normal rate and regular rhythm.      Heart sounds: Normal heart sounds. No murmur heard.  Pulmonary:      Effort: Pulmonary effort is normal. No respiratory distress.      Breath sounds: Normal breath sounds. No wheezing.   Abdominal:      General: Abdomen is flat.      Palpations: Abdomen is soft.      Tenderness: There is no abdominal tenderness.   Musculoskeletal:         General: Normal range of motion.      Cervical back: Normal range of motion.   Skin:     General: Skin is warm.      Findings: Lesion present. No erythema.      Comments: Large nodule tip of left big toe   Neurological:      General: No focal deficit present.      Mental Status: She is alert and oriented to person, place, and time.   Psychiatric:         Mood and Affect: Mood normal.         Behavior: Behavior normal.         Thought Content: Thought content normal.               Signed Electronically by: Jh Avalos MD  Lung Cancer Screening Shared Decision Making Visit     Cheyenne Schulte, a 53 year old female, is eligible for lung cancer screening    History   Smoking Status    Every Day    Types: Cigarettes   Smokeless Tobacco    Never       I have discussed with patient the risks and benefits of screening for lung cancer with low-dose CT.     The risks include:    radiation exposure: one low dose chest CT has as much ionizing radiation as about 15 chest x-rays, or 6 months of background radiation living in  [place]      false positives: most findings/nodules are NOT cancer, but some might still require additional diagnostic evaluation, including biopsy    over-diagnosis: some slow growing cancers that might never have been clinically significant will be detected and treated unnecessarily     The benefit of early detection of lung cancer is contingent upon adherence to annual screening or more frequent follow up if indicated.     Furthermore, to benefit from screening, Candida must be willing and able to undergo diagnostic procedures, if indicated. Although no specific guide is available for determining severity of comorbidities, it is reasonable to withhold screening in patients who have greater mortality risk from other diseases.     We did discuss that the best way to prevent lung cancer is to not smoke.    Some patients may value a numeric estimation of lung cancer risk when evaluating if lung cancer screening is right for them, here is one calculator:    ShouldIScreen

## 2024-06-10 NOTE — PATIENT INSTRUCTIONS
If you have any questions regarding your visit, Please contact your care team.    To Schedule an Appointment 24/7  Call: [phone]  Women s Health  TELEPHONE NUMBER   Armando Hsieh M.D.    [place]-Medical Assistant    Radha Esposito-Surgery Scheduler  [place]ait[place]- Tuesday-[place]                   7:30 a.m.-3:30 p.m.  Wednesday-[place]             8:00 a.m.-4:30 p.m.  Thursday-[place]     8:00 a.m.-4:00 p.m.  Friday-[place]                       7:30 a.m.-1:00 p.m. [facility]  [address] [place] [address].  [place] [address] [phone] Phone  [phone] Fax    Imaging Scheduling all locations  [phone]      [facility] Delivery  [address]  [place], [place] [address] [phone]    [facility]  [address] [place] [id number] [phone] ask for Women's Clinic     **Surgeries** Our Surgery Schedulers will contact you to schedule. If you do not receive a call within 3 business days, please call [phone].    Urgent Care locations:  [facility] Monday-Friday                 10 am - 8 pm  Saturday and Sunday        9 am - 5 pm   [phone]   If you need a medication refill, please contact your pharmacy. Please allow 3 business days for your refill to be completed.  As always, Thank you for trusting us with your healthcare needs!  If you have any questions regarding your visit, Please contact your care team.    [employer] [facility]: [phone]    To Schedule an Appointment 24/7  Call: [phone]    see additional instructions from your care team below

## 2024-06-14 ENCOUNTER — PRE VISIT (OUTPATIENT)
Dept: PSYCHIATRY | Facility: CLINIC | Age: 54
End: 2024-06-14
Payer: COMMERCIAL

## 2024-06-14 NOTE — TELEPHONE ENCOUNTER
PSYCHIATRY CLINIC PHONE INTAKE     SERVICES REQUESTED / INTERESTED IN          Med Management    Presenting Problem and Brief History                              What would you like to be seen for? (brief description):    Switching med management. Current provider has patient on 300 mg effexor each morning, and 40 mg xr adderall, 30 mg buspar (1 in am 1 in pm). Patient wants to reevaluate effexor dose.      Have you received a mental health diagnosis? Yes   Which one (s): ADHD, anxiety/depression  Is there any history of developmental delay?  No   Are you currently seeing a mental health provider?  Yes            Who / month last seen:  private practice med prescriber, ananth sellers  Do you have mental health records elsewhere?  Yes - just private practice doc  Will you sign a release so we can obtain them?  Yes    Have you ever been hospitalized for psychiatric reasons?  No  Describe:  na    Do you have current thoughts of self-harm?  No    Do you currently have thoughts of harming others?  No    Do you have any safety concerns? No   If yes to these, offer to reach out to a  for follow up.      Substance Use History     Do you have any history of alcohol  or other substance use?  No  Describe:  na (casual beer, nothing habitual)  Have you ever received treatment for this?  No    Describe:  na     Social History     Who is the patient's a guardian?  No    Name / number: na  Have you had an ACT team in last 12 months?  No  Describe: na   OK to leave a detailed voicemail?  Yes        Medical/ Surgical History                                   Patient Active Problem List   Diagnosis    Intermittent asthma    Cervical high risk HPV (human papillomavirus) test positive    Papanicolaou smear of cervix with low grade squamous intraepithelial lesion (LGSIL)          Medications             Have you taken >3 psychiatric medications in your past?  y  Do you currently take 5 or more medications, including  prescriptions, supplements, and other over the counter products?  n    If YES to at least one of these questions:   As part of your evaluation in our clinic, we have specially trained pharmacists as part of your care team. Your provider would like for you to meet with one of our pharmacists to review your current and past medications, ensure your med list is up to date, and queue up any questions or concerns you have about medications. They will review all of your medications, not just for mental health, to help ensure you know what you re taking and that everything is working together.     Please schedule patient in [facility] (Meghana Hitchcock or Gris Estrada) for 60m MTM in any green space as virtual (video), telephone, or in person (designated in person days per Epic templates).  -Appt notes can say  Psych eval on xx/xx   -Route telephone encounter to the pharmacist who will be seeing the patient.  If patient has questions about insurance coverage or billing, please still schedule the visit and refer them to call the [facility] coordinators at [phone].    Current Outpatient Medications   Medication Sig Dispense Refill    acetaminophen (TYLENOL) 650 MG CR tablet Take 1 tablet (650 mg) by mouth every 8 hours as needed for pain 60 tablet 1    albuterol (PROAIR HFA/PROVENTIL HFA/VENTOLIN HFA) 108 (90 Base) MCG/ACT inhaler Inhale 2 puffs into the lungs every 6 hours 18 g 3    amphetamine-dextroamphetamine (ADDERALL XR) 30 MG 24 hr capsule Take 30 mg by mouth daily.      busPIRone HCl (BUSPAR) 30 MG tablet Take 1 tablet by mouth daily      [START ON 7/18/2024] nicotine (NICODERM CQ) 14 MG/24HR 24 hr patch Place 1 patch onto the skin every 24 hours      nicotine (NICODERM CQ) 21 MG/24HR 24 hr patch Place 1 patch onto the skin every 24 hours      [START ON 8/2/2024] nicotine (NICODERM CQ) 7 MG/24HR 24 hr patch Place 1 patch onto the skin every 24 hours      nicotine polacrilex (NICORETTE) 4 MG gum How to take  "it: When the urge to smoke occurs, chew gum until you feel a tingle, then \"park\" the gum in your cheek until the tingle is gone. Re-chew every few minutes and \"park\" again, chewing one piece for 30 minutes. Do not eat or drink while chewing. Follow this schedule: Weeks 1 to 6: One piece of gum every 1 to 2 hours. Use at least 9 pieces a day, but no more than 24. Weeks 7 to 9: One piece of gum every 2 to 4 hours. Weeks 10 to 12: One piece of gum every 4 to 8 hours.      venlafaxine (EFFEXOR XR) 150 MG 24 hr capsule Take 150 mg by mouth daily Take 2 by mouth every morning           DISPOSITION      Phone screen completed with patient. Scheduled for MTM on 7/2/24, ROSA MARIA Healy 8/16/24. Both virtual. New patient packet emailed    Complex  Amelie Loja  "

## 2024-06-20 ENCOUNTER — TELEPHONE (OUTPATIENT)
Dept: PSYCHIATRY | Facility: CLINIC | Age: 54
End: 2024-06-20
Payer: COMMERCIAL

## 2024-06-20 RX ORDER — BISACODYL 5 MG/1
TABLET, DELAYED RELEASE ORAL
Qty: 4 TABLET | Refills: 0 | Status: SHIPPED | OUTPATIENT
Start: 2024-06-20

## 2024-06-20 NOTE — TELEPHONE ENCOUNTER
On 6/14/24 the patient signed an TRINIDAD authorizing the release of all pertinent records from Dr. Feliberto House to [facility] for the purpose of continuing care.  I faxed this TRINIDAD to [phone] on 6/20/24, stamped that it was faxed, dated and sent to scanning. I held a copy in clinic until scanning complete/confirmed. Nickie PEÑA

## 2024-06-20 NOTE — TELEPHONE ENCOUNTER
Extended Golytely Bowel Prep  recommended due to BMI > 40.  Instructions were sent via [employer]. Bowel prep was sent 6/20/2024 to [facility][id number] [address].

## 2024-06-25 ENCOUNTER — ANCILLARY PROCEDURE (OUTPATIENT)
Dept: MAMMOGRAPHY | Facility: CLINIC | Age: 54
End: 2024-06-25
Attending: FAMILY MEDICINE
Payer: COMMERCIAL

## 2024-06-25 ENCOUNTER — ANCILLARY PROCEDURE (OUTPATIENT)
Dept: CT IMAGING | Facility: CLINIC | Age: 54
End: 2024-06-25
Attending: FAMILY MEDICINE
Payer: COMMERCIAL

## 2024-06-25 ENCOUNTER — OFFICE VISIT (OUTPATIENT)
Dept: OBGYN | Facility: CLINIC | Age: 54
End: 2024-06-25
Payer: COMMERCIAL

## 2024-06-25 VITALS
OXYGEN SATURATION: 95 % | WEIGHT: 234.6 LBS | DIASTOLIC BLOOD PRESSURE: 81 MMHG | HEART RATE: 92 BPM | BODY MASS INDEX: 41.36 KG/M2 | SYSTOLIC BLOOD PRESSURE: 135 MMHG

## 2024-06-25 DIAGNOSIS — Z87.891 PERSONAL HISTORY OF TOBACCO USE: ICD-10-CM

## 2024-06-25 DIAGNOSIS — Z01.419 ENCOUNTER FOR GYNECOLOGICAL EXAMINATION WITHOUT ABNORMAL FINDING: Primary | ICD-10-CM

## 2024-06-25 DIAGNOSIS — R87.810 CERVICAL HIGH RISK HPV (HUMAN PAPILLOMAVIRUS) TEST POSITIVE: ICD-10-CM

## 2024-06-25 DIAGNOSIS — R87.612 LGSIL ON PAP SMEAR OF CERVIX: ICD-10-CM

## 2024-06-25 DIAGNOSIS — Z12.31 VISIT FOR SCREENING MAMMOGRAM: ICD-10-CM

## 2024-06-25 DIAGNOSIS — Z30.42 ENCOUNTER FOR DEPO-PROVERA CONTRACEPTION: ICD-10-CM

## 2024-06-25 PROCEDURE — 77067 SCR MAMMO BI INCL CAD: CPT | Mod: TC | Performed by: RADIOLOGY

## 2024-06-25 PROCEDURE — 99396 PREV VISIT EST AGE 40-64: CPT | Mod: 25 | Performed by: OBSTETRICS & GYNECOLOGY

## 2024-06-25 PROCEDURE — 87624 HPV HI-RISK TYP POOLED RSLT: CPT | Performed by: OBSTETRICS & GYNECOLOGY

## 2024-06-25 PROCEDURE — G0145 SCR C/V CYTO,THINLAYER,RESCR: HCPCS | Performed by: OBSTETRICS & GYNECOLOGY

## 2024-06-25 PROCEDURE — 71271 CT THORAX LUNG CANCER SCR C-: CPT | Mod: TC | Performed by: RADIOLOGY

## 2024-06-25 PROCEDURE — 96372 THER/PROPH/DIAG INJ SC/IM: CPT | Performed by: OBSTETRICS & GYNECOLOGY

## 2024-06-25 PROCEDURE — 77063 BREAST TOMOSYNTHESIS BI: CPT | Mod: TC | Performed by: RADIOLOGY

## 2024-06-25 RX ADMIN — MEDROXYPROGESTERONE ACETATE 150 MG: 150 INJECTION, SUSPENSION INTRAMUSCULAR at 11:04

## 2024-06-25 NOTE — PROGRESS NOTES
Cheyenne Schulte is a 53 year old female , who presents for annual gyn exam.   No incontinence, or unusual discharge.   She is currently using Depo Provera for contraception.  She does not have any apparent contraindications to use.  She has not had any apparent complications with it's use, although she has occasionally had breakthrough bleeding, a couple of times a year.    Last cholesterol:   Recent Labs   Lab Test 24  0821 22  0845   CHOL 207* 235*   HDL 54 79   * 120*   TRIG 106 182*     Past Medical History:   Diagnosis Date    ADHD (attention deficit hyperactivity disorder)     sees psych     Arthritis 23    It s probably in my right hip too    Depressive disorder  s    Diabetes (H) 2008    Gestational 2008    Family history of colon cancer     Intermittent asthma     Mild major depression (H24)     sees psych        Past Surgical History:   Procedure Laterality Date    ABDOMEN SURGERY      Hernia repair    COLONOSCOPY N/A 2022    Procedure: COLONOSCOPY, FLEXIBLE, WITH LESION REMOVAL USING SNARE;  Surgeon: Shai Da Silva DO;  Location: [facility]    COLONOSCOPY N/A 2022    Procedure: COLONOSCOPY, WITH POLYPECTOMY AND BIOPSY;  Surgeon: Shai Da Silva DO;  Location[facility]    COLONOSCOPY N/A 10/28/2022    Procedure: COLONOSCOPY, FLEXIBLE, WITH LESION REMOVAL USING SNARE;  Surgeon: Jennifer Ash DO;  Location[facility]    COLONOSCOPY N/A 10/28/2022    Procedure: COLONOSCOPY, WITH POLYPECTOMY AND BIOPSY;  Surgeon: Jennifer Ash DO;  [facility]    COLONOSCOPY N/A 2023    Procedure: COLONOSCOPY, WITH POLYPECTOMY AND BIOPSY;  Surgeon: eJnnifer Ash DO;  [facility]    COLONOSCOPY N/A 2023    Procedure: COLONOSCOPY, FLEXIBLE, WITH LESION REMOVAL USING SNARE;  Surgeon: Jennifer Ash DO;  [facility]    COLONOSCOPY WITH CO2 INSUFFLATION N/A 2022    Procedure: COLONOSCOPY, WITH CO2 INSUFFLATION;  Surgeon: Shai Da Silva DO;   [facility]    COLONOSCOPY WITH CO2 INSUFFLATION N/A 10/28/2022    Procedure: COLONOSCOPY, WITH CO2 INSUFFLATION;  Surgeon: Jennifer Ash DO;  [facility]    COLONOSCOPY WITH CO2 INSUFFLATION N/A 2023    Procedure: Colonoscopy with CO2 insufflation;  Surgeon: Jennifer Ash DO;  Location: [facility]    DILATION AND CURETTAGE      AB    GYN SURGERY      c section    HERNIA REPAIR      umbilical    ORTHOPEDIC SURGERY      ORIF right tibia       OB History    Para Term  AB Living   4 2 0 0 2 2   SAB IAB Ectopic Multiple Live Births   1 1 0 0 2      # Outcome Date GA Lbr Jared/2nd Weight Sex Type Anes PTL Lv   4 SAB            3 Para      CS-LTranv      2 Para            1 IAB                Gyn History:  Gynecological History            Patient's last menstrual period was 2024 (approximate).         history of abnormal pap smear:  yes  Pap smear history:    , , ,  NIL paps  08 ASCUS, neg HPV  10/2/12 NIL  Above per Care Everywhere  Gap in care/records  22 LSIL, +HR HPV (not 16/18).          Current Outpatient Medications   Medication Sig Dispense Refill    acetaminophen (TYLENOL) 650 MG CR tablet Take 1 tablet (650 mg) by mouth every 8 hours as needed for pain 60 tablet 1    albuterol (PROAIR HFA/PROVENTIL HFA/VENTOLIN HFA) 108 (90 Base) MCG/ACT inhaler Inhale 2 puffs into the lungs every 6 hours 18 g 3    amphetamine-dextroamphetamine (ADDERALL XR) 30 MG 24 hr capsule Take 30 mg by mouth daily.      busPIRone HCl (BUSPAR) 30 MG tablet Take 1 tablet by mouth daily      venlafaxine (EFFEXOR XR) 150 MG 24 hr capsule Take 150 mg by mouth daily Take 2 by mouth every morning      bisacodyl (DULCOLAX) 5 MG EC tablet Two days prior to exam take two (2) tablets at 4pm. One day prior to exam take two (2) tablets at 4pm (Patient not taking: Reported on 2024) 4 tablet 0    [START ON 2024] nicotine (NICODERM CQ) 14 MG/24HR 24 hr patch Place 1 patch onto the  "skin every 24 hours (Patient not taking: Reported on 6/25/2024)      nicotine (NICODERM CQ) 21 MG/24HR 24 hr patch Place 1 patch onto the skin every 24 hours (Patient not taking: Reported on 6/25/2024)      [START ON 8/2/2024] nicotine (NICODERM CQ) 7 MG/24HR 24 hr patch Place 1 patch onto the skin every 24 hours (Patient not taking: Reported on 6/25/2024)      nicotine polacrilex (NICORETTE) 4 MG gum How to take it: When the urge to smoke occurs, chew gum until you feel a tingle, then \"park\" the gum in your cheek until the tingle is gone. Re-chew every few minutes and \"park\" again, chewing one piece for 30 minutes. Do not eat or drink while chewing. Follow this schedule: Weeks 1 to 6: One piece of gum every 1 to 2 hours. Use at least 9 pieces a day, but no more than 24. Weeks 7 to 9: One piece of gum every 2 to 4 hours. Weeks 10 to 12: One piece of gum every 4 to 8 hours. (Patient not taking: Reported on 6/25/2024)      polyethylene glycol (GOLYTELY) 236 g suspension Two days before procedure at 5PM fill first container with water. Mix and drink an 8 oz glass every 10-15 minutes until HALF of the container is gone. Place the remainder in the refrigerator. One day before procedure at 5PM drink second half of bowel prep. Drink an 8 oz glass every 10-15 minutes until it is gone. Day of procedure 6 hours before arrival time fill the 2nd container with water. Mix and drink an 8 oz glass every 10-15 minutes until HALF of the container is gone. Discard the remaining solution. (Patient not taking: Reported on 6/25/2024) 8000 mL 0       Allergies   Allergen Reactions    Theophylline Cr     Zyban [Bupropion Hydrobromide]     Bupropion Rash       Social History     Socioeconomic History    Marital status:      Spouse name: Not on file    Number of children: Not on file    Years of education: Not on file    Highest education level: Not on file   Occupational History    Not on file   Tobacco Use    Smoking status: Every " Day     Current packs/day: 1.00     Average packs/day: 1 pack/day for 30.0 years (30.0 ttl pk-yrs)     Types: Cigarettes    Smokeless tobacco: Never    Tobacco comments:     I ve tried quitting numerous times. Only thing that ever worked was pregnancy.   Vaping Use    Vaping status: Never Used   Substance and Sexual Activity    Alcohol use: Yes     Alcohol/week: 4.0 standard drinks of alcohol     Types: 4 Cans of beer per week     Comment: 4 drinks a week    Drug use: Yes     Types: Marijuana     Comment: Helps my back pain    Sexual activity: Yes     Partners: Male     Birth control/protection: Injection     Comment: I would like to get on birth control if possible.   Other Topics Concern    Parent/sibling w/ CABG, MI or angioplasty before 65F 55M? No   Social History Narrative    Not on file     Social Determinants of Health     Financial Resource Strain: Low Risk  (6/3/2024)    Financial Resource Strain     Within the past 12 months, have you or your family members you live with been unable to get utilities (heat, electricity) when it was really needed?: No   Food Insecurity: Low Risk  (6/3/2024)    Food Insecurity     Within the past 12 months, did you worry that your food would run out before you got money to buy more?: No     Within the past 12 months, did the food you bought just not last and you didn t have money to get more?: No   Transportation Needs: Low Risk  (6/3/2024)    Transportation Needs     Within the past 12 months, has lack of transportation kept you from medical appointments, getting your medicines, non-medical meetings or appointments, work, or from getting things that you need?: No   Physical Activity: Inactive (6/3/2024)    Exercise Vital Sign     Days of Exercise per Week: 0 days     Minutes of Exercise per Session: 20 min   Stress: Stress Concern Present (6/3/2024)    [employer] [place] [employer] - Occupational Stress Questionnaire     Feeling of Stress : To some extent    Social Connections: Unknown (6/3/2024)    Social Connection and Isolation Panel [NHANES]     Frequency of Communication with Friends and Family: Not on file     Frequency of Social Gatherings with Friends and Family: More than three times a week     Attends [employer] Services: Not on file     Active Member of Clubs or Organizations: Not on file     Attends Club or Organization Meetings: Not on file     Marital Status: Not on file   Interpersonal Safety: Low Risk  (6/6/2024)    Interpersonal Safety     Do you feel physically and emotionally safe where you currently live?: Yes     Within the past 12 months, have you been hit, slapped, kicked or otherwise physically hurt by someone?: No     Within the past 12 months, have you been humiliated or emotionally abused in other ways by your partner or ex-partner?: No   Housing Stability: Low Risk  (6/3/2024)    Housing Stability     Do you have housing? : Yes     Are you worried about losing your housing?: No       Family History   Problem Relation Age of Onset    Arthritis Mother     Colon Cancer Mother     C.A.D. Father     Diabetes Father     Alcohol/Drug Father     Heart Disease Father     Coronary Artery Disease Father     Substance Abuse Father     Obesity Father          ROS:  All negative except as above.      EXAM:  /81 (BP Location: Right arm, Patient Position: Sitting, Cuff Size: Adult Large)   Pulse 92   Wt 106.4 kg (234 lb 9.6 oz)   LMP 01/31/2024 (Approximate)   SpO2 95%   BMI 41.36 kg/m    General:  WNWD female, NAD  Alert  Oriented x 3  Gait:  Normal  Skin:  Normal skin turgor  HEENT:  NC/AT, EOMI  Lungs:  good Respiratory effort   Breasts:  Declined   Abdomen:  Non-tender, non-distended.  Vulva: No external lesions, normal hair distribution, no adenopathy  BUS:  Normal, no masses noted  Urethra:  No hypermobility noted  Urethral meatus:  No masses noted  Vagina: Moist, pink, no abnormal discharge, well rugated, no lesions  Cervix: Smooth, pink,  no visible lesions  Uterus: Normal size, anteverted, non-tender, mobile  Ovaries: No mass, non-tender, mobile  Perianal:  No masses noted.  Hemorrhoids  Rectal exam: Declined.  She has Colonoscopy in a couple of weeks  Extremities:  No clubbing, cyanosis, or edema      ASSESSMENT/PLAN   Annual Gynecologic examination   LSIL pap smear / HR HPV of cervix:  pap smear performed today   She had her mammogram today.   Low fat diet, weight bearing exercises and self breast exams on a monthly basis have been recommended.  I have discussed with patient the risks, benefits, medications, treatment options and modalities.   I have instructed the patient to call or schedule a follow-up appointment if any problems.

## 2024-06-25 NOTE — PROGRESS NOTES
Clinic Administered Medication Documentation      Depo Provera Documentation    Depo-Provera Standing Order inclusion/exclusion criteria reviewed.     Is this the initial or subsequent dose of Depo Provera? Subsequent dose - patient is within the acceptable window of time (11-15 weeks) for subsequent injection. Pregnancy test not indicated.    Patient meets: inclusion criteria     Is there an active order (written within the past 365 days, with administrations remaining, not ) in the chart? Yes.     Prior to injection, verified patient identity using patient's name and date of birth. Medication was administered. Please see MAR and medication order for additional information.     Vial/Syringe: Single dose vial. Was entire vial of medication used? Yes    Patient instructed to remain in clinic for 15 minutes and report any adverse reaction to staff immediately.  NEXT INJECTION DUE: 9/10/24 - 10/8/24

## 2024-06-26 ENCOUNTER — MYC MEDICAL ADVICE (OUTPATIENT)
Dept: OBGYN | Facility: CLINIC | Age: 54
End: 2024-06-26
Payer: COMMERCIAL

## 2024-06-27 LAB
HPV HR 12 DNA CVX QL NAA+PROBE: NEGATIVE
HPV16 DNA CVX QL NAA+PROBE: NEGATIVE
HPV18 DNA CVX QL NAA+PROBE: NEGATIVE
HUMAN PAPILLOMA VIRUS FINAL DIAGNOSIS: NORMAL

## 2024-07-01 PROBLEM — R87.612 PAPANICOLAOU SMEAR OF CERVIX WITH LOW GRADE SQUAMOUS INTRAEPITHELIAL LESION (LGSIL): Status: ACTIVE | Noted: 2022-07-19

## 2024-07-01 LAB
BKR LAB AP GYN ADEQUACY: NORMAL
BKR LAB AP GYN INTERPRETATION: NORMAL
BKR LAB AP LMP: NORMAL
BKR LAB AP PREVIOUS ABNL DX: NORMAL
BKR LAB AP PREVIOUS ABNORMAL: NORMAL
PATH REPORT.COMMENTS IMP SPEC: NORMAL
PATH REPORT.COMMENTS IMP SPEC: NORMAL
PATH REPORT.RELEVANT HX SPEC: NORMAL

## 2024-07-01 NOTE — PROGRESS NOTES
"Disease State Management Encounter:                          Janice Schulte is a 53 year old female contacted via secure video for an initial visit. She was referred to me from psychiatry intake.     Reason for visit: med rec    Psychiatry: Dr. Feliberto House (private practice)     Medication Adherence/Access: no issues reported  She does notice \"zinging\" in her head if its been 36 hours since last venlafaxine (this happens very infrequently)    Depression/anxiety/ADHD:   -venlafaxine ER 300mg (2 x150mg) every morning  -buspirone 30mg BID  -Adderall ER 40mg (2 x 20mg) daily    Patient reported that depression and anxiety has been well managed on current medications. She has been taking them at current doses for multiple years. She has taken antidepressants since her 20s. Meds were changed a bit during pregnancy, but has taken venlafaxine for the last 15-20 years.   Adderall has been quite helpful for concentration and focus and being able to function at work.   Current provider is in private practice, who has been increasingly difficult to contact and patient feels that appointments have not focused on her care. She recalls that \"doses just kept increasing\" when going through divorce and her mother's death. Pt is interested in reducing doses to what is necessary or if other medication may be preferred, but is very nervous to make med changes. She prefers to stay on current meds until psychiatry intake. She only has three weeks left of medication and psychiatry is in 6 weeks. She has been having lots of difficulty getting in touch with psychiatrist to obtain records.      Past Medication Trials    Medications Max dose Dates/Duration Discontinuation Reason Other Notes   citalopram  1990s Pregnancy?    bupropion  Early 2000s Wasn't sleeping for 5 days, then developed rash Used to quit smoking   Escitalopram   1990s         Assessment/Plan:    Doing well, but open to med changes. Would be reasonable to first reduce " venlafaxine and reassess. Will reach out to PCP to see if ok to bridge prescription before psychiatry visit.      Follow-up: as needed  Psychiatry 8/16/24    I spent 20 minutes with this patient today. A copy of the visit note was provided to the patient's provider(s).    A summary of these recommendations was sent via clinic portal.    Gris Estrada PharmD  Medication Therapy Management Pharmacist  [facility]       Medication Therapy Recommendations  No medication therapy recommendations to display

## 2024-07-02 ENCOUNTER — VIRTUAL VISIT (OUTPATIENT)
Dept: PHARMACY | Facility: CLINIC | Age: 54
End: 2024-07-02
Payer: COMMERCIAL

## 2024-07-02 DIAGNOSIS — F41.1 GAD (GENERALIZED ANXIETY DISORDER): ICD-10-CM

## 2024-07-02 DIAGNOSIS — F33.42 RECURRENT MAJOR DEPRESSIVE DISORDER, IN FULL REMISSION (H): Primary | ICD-10-CM

## 2024-07-02 DIAGNOSIS — F90.0 ATTENTION DEFICIT HYPERACTIVITY DISORDER (ADHD), PREDOMINANTLY INATTENTIVE TYPE: ICD-10-CM

## 2024-07-02 PROCEDURE — 99207 PR NO CHARGE LOS: CPT | Performed by: PHARMACIST

## 2024-07-02 RX ORDER — VENLAFAXINE HYDROCHLORIDE 150 MG/1
300 CAPSULE, EXTENDED RELEASE ORAL DAILY
Qty: 60 CAPSULE | Refills: 0 | Status: SHIPPED | OUTPATIENT
Start: 2024-07-02 | End: 2024-07-03

## 2024-07-02 RX ORDER — BUSPIRONE HYDROCHLORIDE 30 MG/1
30 TABLET ORAL 2 TIMES DAILY
Qty: 60 TABLET | Refills: 0 | Status: SHIPPED | OUTPATIENT
Start: 2024-07-02 | End: 2024-07-03

## 2024-07-02 RX ORDER — DEXTROAMPHETAMINE SACCHARATE, AMPHETAMINE ASPARTATE MONOHYDRATE, DEXTROAMPHETAMINE SULFATE AND AMPHETAMINE SULFATE 5; 5; 5; 5 MG/1; MG/1; MG/1; MG/1
40 CAPSULE, EXTENDED RELEASE ORAL DAILY
COMMUNITY
End: 2024-07-03

## 2024-07-02 NOTE — Clinical Note
It's not working to leave it pended here, so please see forthcoming telephone encounter for pended order instead.

## 2024-07-02 NOTE — Clinical Note
Hello, I met with this patient to review medications prior to her upcoming psychiatry intake with us. She has been seeing psychiatry in private practice for a number of years, but reported having increasing issues with communication and quality of care. She has three weeks left of meds (venlafaxine ER 300mg, buspirone 30mg BID, and Adderall 40mg daily) and psych intake is in 6 weeks. Are you ok with providing a one month supply of these meds to get her through? See note for details and let me know what you think.  Thanks, Gris Estrada, PharmD Medication Therapy Management Pharmacist [facility]

## 2024-07-02 NOTE — PATIENT INSTRUCTIONS
Recommendations from today's disease management visit:                                                      Continue current medication.  I will send message to your primary care to see about bridging prescriptions until your psychiatry visit in 6 weesk    Follow-up: as needed    To schedule another MTM appointment, please call the clinic directly or you may call the MTM scheduling line at [phone] or toll-free at [phone].     My Clinical Pharmacist's contact information:                                                      Please feel free to contact me with any questions or concerns you have.      Gris Estrada, PharmD  Medication Therapy Management Pharmacist  [facility] Neu[facility]

## 2024-07-02 NOTE — Clinical Note
Thank you! I have sent the venlafaxine and buspirone. Adderall is pended in this encounter for you to please sign.  -Gris

## 2024-07-03 DIAGNOSIS — R41.840 INATTENTION: Primary | ICD-10-CM

## 2024-07-03 RX ORDER — BUSPIRONE HYDROCHLORIDE 30 MG/1
30 TABLET ORAL 2 TIMES DAILY
Qty: 60 TABLET | Refills: 0 | Status: SHIPPED | OUTPATIENT
Start: 2024-07-03 | End: 2024-08-16

## 2024-07-03 RX ORDER — VENLAFAXINE HYDROCHLORIDE 150 MG/1
300 CAPSULE, EXTENDED RELEASE ORAL DAILY
Qty: 60 CAPSULE | Refills: 0 | Status: SHIPPED | OUTPATIENT
Start: 2024-07-03 | End: 2024-08-16

## 2024-07-03 NOTE — PROGRESS NOTES
Rx's for venlafaxine and buspirone sent. Rx for Adderall pended and routed to provider to sign.  [employer] message sent to patient with update.    Gris Estrada PharmD  Medication Therapy Management Pharmacist  [facility] Neu[facility]

## 2024-07-05 RX ORDER — DEXTROAMPHETAMINE SACCHARATE, AMPHETAMINE ASPARTATE MONOHYDRATE, DEXTROAMPHETAMINE SULFATE AND AMPHETAMINE SULFATE 5; 5; 5; 5 MG/1; MG/1; MG/1; MG/1
40 CAPSULE, EXTENDED RELEASE ORAL DAILY
Qty: 60 CAPSULE | Refills: 0 | Status: SHIPPED | OUTPATIENT
Start: 2024-07-05 | End: 2024-08-16

## 2024-07-10 ENCOUNTER — TELEPHONE (OUTPATIENT)
Dept: GASTROENTEROLOGY | Facility: CLINIC | Age: 54
End: 2024-07-10

## 2024-07-10 DIAGNOSIS — R11.2 NAUSEA WITH VOMITING: Primary | ICD-10-CM

## 2024-07-10 NOTE — TELEPHONE ENCOUNTER
Pre visit planning completed.      Procedure details:    Patient scheduled for Colonoscopy  on 7.22.24.     Arrival time: 0935. Procedure time 1020    Facility location: [facility]; [address], [place] [address]. Check in location: 2nd Floor at Surgery desk.    Sedation type: Conscious sedation     Pre op exam needed? N/A    Indication for procedure: screening      Chart review:     Electronic implanted devices? No    Recent diagnosis of diverticulitis within the last 6 weeks? No    Diabetic? No      Medication review:    Anticoagulants? No    NSAIDS? No NSAID medications per patient's medication list.  RN will verify with pre-assessment call.    Other medication HOLDING recommendations:  N/A      Prep for procedure:     Bowel prep recommendation: Extended Golytely. Bowel prep prescription sent to [facility] #[id number] [address] OF [place] [address]   Due to: BMI > 40.     Prep instructions sent via taya Okeefe RN  Endoscopy Procedure Pre Assessment RN  [phone] option 4

## 2024-07-11 ENCOUNTER — TELEPHONE (OUTPATIENT)
Dept: GASTROENTEROLOGY | Facility: CLINIC | Age: 54
End: 2024-07-11
Payer: COMMERCIAL

## 2024-07-11 NOTE — TELEPHONE ENCOUNTER
"Contacted patient to complete pre assessment.    At the beginning of call the patient asks if she can have lower volume prep like \"last time\".  Upon review of  notes recommendation for MAC for colonoscopies d/t to past high dose of CS  4/20/23 [facility]:   \"Jennifer Ash, Jennifer Arvizu, JAMAR; CAYDEN Vargas Rn Gi Procedures; P Endoscopy Scheduling Pool  She needs MAC - she's had high medication requirements with conscious sedation in the past and I think MAC would allow her to have the best possible exam.  You can send her in some zofran to take with the prep to help.  Its not urgent that she come in so whenever MAC works with her schedule. \"    This writer put note in blue sticky note requiring MAC for all future endoscopies.  Upon discussion the patient should be scheduled with MAC and agrees to be transferred to scheduling.    The patient states she has never tried an anti nausea medication with prep, however, is willing to try this.  Writer put note in blue sticky to have anti nausea medication with prep for future.    The patient warm transferred to scheduling to r/s with MAC.    Corinne Kliber, RN  Endoscopy Procedure Pre Assessment RN  [phone] option 4    "

## 2024-07-11 NOTE — TELEPHONE ENCOUNTER
Caller: Cheyenne     Reason for Reschedule/Cancellation   (please be detailed, any staff messages or encounters to note?): Needs MAC per RN Assessment      Prior to reschedule please review:  Ordering Provider: Jh Marks MD  Sedation Determined: MAC  Does patient have any ASC Exclusions, please identify?: N      Notes on Cancelled Procedure:  Procedure: Lower Endoscopy [Colonoscopy]   Date: 07/22/2024  Location: [facility]; [address], [place] [id number]   Surgeon: Nilsa      Rescheduled: Yes,   Procedure: Lower Endoscopy [Colonoscopy]    Date: 10/02/2024   Location: [facility]; [address], [place] [id number]   Surgeon: Dawn   Sedation Level Scheduled  MAC ,  Reason for Sedation Level Location   Instructions updated and sent: y     Does patient need PAC or Pre -Op Rescheduled? : n       Did you cancel or rescheduled an EUS procedure? No.

## 2024-08-02 ENCOUNTER — OFFICE VISIT (OUTPATIENT)
Dept: AUDIOLOGY | Facility: CLINIC | Age: 54
End: 2024-08-02
Attending: FAMILY MEDICINE
Payer: COMMERCIAL

## 2024-08-02 DIAGNOSIS — H69.93 NEGATIVE MIDDLE EAR PRESSURE OF BOTH EARS: ICD-10-CM

## 2024-08-02 DIAGNOSIS — H90.6 MIXED HEARING LOSS, BILATERAL: Primary | ICD-10-CM

## 2024-08-02 DIAGNOSIS — H91.93 HEARING DEFICIT, BILATERAL: ICD-10-CM

## 2024-08-02 DIAGNOSIS — H93.13 TINNITUS, BILATERAL: ICD-10-CM

## 2024-08-02 PROCEDURE — 92557 COMPREHENSIVE HEARING TEST: CPT | Performed by: AUDIOLOGIST

## 2024-08-02 PROCEDURE — 92567 TYMPANOMETRY: CPT | Performed by: AUDIOLOGIST

## 2024-08-02 NOTE — PROGRESS NOTES
AUDIOLOGY REPORT    SUBJECTIVE:  Cheyenne Schulte is a 54 year old female who was seen in the Audiology Clinic at the [facility] for audiologic evaluation, referred by Jh Marks MD. The patient reports constant bilateral tinnitus that has been present for years. She also notes worsening hearing, especially in background noise. 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 She reports that her father and grandfather both have hearing loss in one ear. The patient reports that her hearing improves temporarily if she is able to pop her ears. She reports a history of ear infections, PE tubes, and ruptured tympanic membranes as a child, but she has not had ear problems since then. She does not have ear pain or pressure. The patient was unaccompanied to today's appointment.     OBJECTIVE:  Otoscopic exam indicates ears are clear of cerumen bilaterally     Pure Tone Thresholds assessed using conventional audiometry with good  reliability from 250-8000 Hz bilaterally using insert earphones and circumaural headphones     RIGHT:  normal hearing sensitivity through 2000 Hz sloping to  mild to moderately severe  mixed hearing loss    LEFT:    mild to moderate mixed hearing loss with normal hearing sensitivity at 500-2000 Hz    Tympanogram:    RIGHT: negative pressure     LEFT:   negative pressure     Reflexes (reported by stimulus ear):  Could not seal    Speech Reception Threshold:    RIGHT: 30 dB HL    LEFT:   25 dB HL    Word Recognition Score:     RIGHT: 100% at 70 dB HL using NU-6 recorded word list.    LEFT:   100% at 70 dB HL using NU-6 recorded word list.      ASSESSMENT:     ICD-10-CM    1. Mixed hearing loss, bilateral  H90.6 [facility] Audi[facility] Thrshld Eval & Speech Recog (20703)     Tympanometry (impedance - testing) (06022)      2. Hearing deficit, bilateral  H91.93 Adult Audiology  Referral       3. Tinnitus, bilateral  H93.13 Adult Audiology  Referral     C[facility]pr[facility] Audiometry Thrshld Eval & Speech Recog (45867)     Tympanometry (impedance - testing) (10859)      4. Negative middle ear pressure of both ears  H69.93 [facility] Audiometry Thrshld Eval & Speech Recog (58246)     Tympanometry (impedance - testing) (75782)        Today s results were discussed with the patient in detail.     PLAN:  Patient was counseled regarding hearing loss and impact on communication. It is recommended that the patient follow up with ENT regarding the mixed hearing loss and negative middle ear pressure. She was provided with scheduling information. Please call this clinic with questions regarding these results or recommendations.      Berna Delgado, CCC-A  [place] Licensed Audiologist #[id number]  8/2/2024

## 2024-08-16 ENCOUNTER — VIRTUAL VISIT (OUTPATIENT)
Dept: PSYCHIATRY | Facility: CLINIC | Age: 54
End: 2024-08-16
Payer: COMMERCIAL

## 2024-08-16 DIAGNOSIS — F33.1 MDD (MAJOR DEPRESSIVE DISORDER), RECURRENT EPISODE, MODERATE (H): Primary | ICD-10-CM

## 2024-08-16 DIAGNOSIS — F41.1 GAD (GENERALIZED ANXIETY DISORDER): ICD-10-CM

## 2024-08-16 PROCEDURE — 99417 PROLNG OP E/M EACH 15 MIN: CPT | Mod: 95

## 2024-08-16 PROCEDURE — 99205 OFFICE O/P NEW HI 60 MIN: CPT | Mod: 95

## 2024-08-16 PROCEDURE — G2211 COMPLEX E/M VISIT ADD ON: HCPCS | Mod: 95

## 2024-08-16 RX ORDER — VENLAFAXINE HYDROCHLORIDE 225 MG/1
225 TABLET, EXTENDED RELEASE ORAL DAILY
Qty: 30 TABLET | Refills: 1 | Status: SHIPPED | OUTPATIENT
Start: 2024-08-16 | End: 2024-09-23 | Stop reason: DRUGHIGH

## 2024-08-16 RX ORDER — BUSPIRONE HYDROCHLORIDE 30 MG/1
30 TABLET ORAL 2 TIMES DAILY
Qty: 60 TABLET | Refills: 0 | Status: SHIPPED | OUTPATIENT
Start: 2024-08-16 | End: 2024-08-16

## 2024-08-16 RX ORDER — BUSPIRONE HYDROCHLORIDE 30 MG/1
30 TABLET ORAL 2 TIMES DAILY
Qty: 60 TABLET | Refills: 1 | Status: SHIPPED | OUTPATIENT
Start: 2024-08-16 | End: 2024-09-23

## 2024-08-16 RX ORDER — VENLAFAXINE HYDROCHLORIDE 37.5 MG/1
37.5 TABLET, EXTENDED RELEASE ORAL DAILY
Qty: 30 TABLET | Refills: 1 | Status: SHIPPED | OUTPATIENT
Start: 2024-08-16 | End: 2024-09-23 | Stop reason: DRUGHIGH

## 2024-08-16 ASSESSMENT — PATIENT HEALTH QUESTIONNAIRE - PHQ9
SUM OF ALL RESPONSES TO PHQ QUESTIONS 1-9: 4
10. IF YOU CHECKED OFF ANY PROBLEMS, HOW DIFFICULT HAVE THESE PROBLEMS MADE IT FOR YOU TO DO YOUR WORK, TAKE CARE OF THINGS AT HOME, OR GET ALONG WITH OTHER PEOPLE: SOMEWHAT DIFFICULT
SUM OF ALL RESPONSES TO PHQ QUESTIONS 1-9: 4

## 2024-08-16 NOTE — NURSING NOTE
Current patient location: [address]   [facility] [address]    Is the patient currently in the state of [place]? YES    Visit mode:VIDEO    If the visit is dropped, the patient can be reconnected by: VIDEO VISIT: Send to e-mail at: [email or web address]    Will anyone else be joining the visit? NO  (If patient encounters technical issues they should call [phone] :[phone])    How would you like to obtain your AVS? MyChart    Are changes needed to the allergy or medication list? Pt stated no changes to allergies and Pt stated no med changes    Are refills needed on medications prescribed by this physician? NO    Rooming Documentation:  Questionnaire(s) completed      Reason for visit: RECHECK    Susan VILLAF

## 2024-08-16 NOTE — PROGRESS NOTES
"Virtual Visit Details    Type of service:  Video Visit     Originating Location (pt. Location): Home    Distant Location (provider location):  Off-site  Platform used for Video Visit: [facility]  NEW PATIENT DIAGNOSTIC ASSESSMENT       Cheyenne Schulte is a 54 year old referred by Jh Bustamante * for evaluation of: \"high dose effexor, wants to cross taper, has had trouble weaning in the past.\" Patient prefers the name Janice.     Initial consultation on 08/16/24.   Who referred you to care?: primary care clinic  CARE TEAM:   PCP- [facility]  Therapist- None              Chief Complaint   Janice identified the reason for seeking services at this time as: \"Changing providers. Either lowering Effexor dosage or changing to new med\". Reported this as beginning approximately 01/01/22.              Assessment & Plan     History and interview support the following diagnoses:   Generalized anxiety disorder  Major depressive disorder, recurrent, moderate    Janice is a 54-year-old adult with psychiatric history of depression, anxiety, and ADHD (by history) who presents for psychiatric evaluation.     Overall Janice is doing well. Her most recent major depressive episode was several years ago after  from ex-. She does endorse mild symptoms of suboptimally treated depression including anhedonia and amotivation and there is room for improvement in his area. She denies SI/HI/NSSI/AH/VH. Substance use, which includes regular cannabis use and alcohol use on the weekends, does not appear to be impacting the clinical picture. She completed ADHD testing several years ago and agrees to forward these results to clinic if she is able to locate a copy of the testing.     Psychotherapy discussion: Primary recommendation for the treatment of mood symptoms, especially anxiety. Supportive therapy can be useful for reducing " the impact of acute or chronic psychosocial stressors. CBT can be particularly helpful for ruminative thinking and addressing maladaptive coping mechanisms that may worsen anxiety. Referral for therapy was placed today, 08/16/24.     Medication discussion: Janice notes that she is interested in reducing venlafaxine. Overall she feels that her previous provider just kept increasing her medications and she is unsure as to whether she is on the most effective regimen. She denies ASE from current medications but is prone to discontinuation symptoms with venlafaxine. She is prescribed higher doses of Buspar, Effexor, and Adderall (although she hasn't been taking Adderall for quite some time; unable to recall the last time she took a dose). Given current serotonergic load, patient may experience symptom improvement with moderate venlafaxine dose reduction. Further, her blood pressures, although stable, have more recently been on the higher end, another reason to move forward with a dose reduction.     Plan is to first reduce venlafaxine and monitor for worsening of anxiety or depressive symptoms. We also discussed the potential for discontinuation symptoms which she has experienced in the past with previous missed doses. Will plan to reduce by increment of 37.5mg to reduce risk of discontinuation symptoms. Patient in agreement with plan.     PSYCHOTROPIC DRUG INTERACTIONS: **Italicized interactions are for treatment plan options not currently implemented**  ADDITIVE SEROTONERGIC: Buspar, venlafaxine, Adderall    MANAGEMENT:  use lowest therapeutic doses of psychotropic medications, routine monitoring, and MTM completed  07/02/24    [place] was [place] today: indicates no dispenses of Adderall over the past 1 year              Plan    1) PSYCHOTROPIC MEDICATION RECOMMENDATIONS:  -Continue melatonin 3mg nightly (take at least 1 hour before intended bedtime)  -Decrease venlafaxine to 262.5mg daily  -Continue Buspar 30mg twice  "daily   -Hold Adderall (not currently using)    2) THERAPY: Psychotherapy is a primary recommendation. Referral placed 08/16/24).     3) NEXT DUE:   Labs- Routine monitoring is not indicated for current psychotropic medication regimen   ECG- Routine monitoring is not indicated for current psychotropic medication regimen   Rating Scales- N/A    4) REFERRALS / COORDINATION: Therapy referral through [facility] (prefers female)    5) DISPOSITION: 09/23/24 at 1:30p    Treatment Risk Statement:  The patient understands the risks, benefits, adverse effects and alternatives. Agrees to treatment with the capacity to do so. No medical contraindications to treatment. Agrees to contact provider for any problems. The patient understands to call 911 or go to the nearest ED if urgent or life threatening symptoms occur. Crisis contact numbers are provided routinely in the After Visit Summary.    Suicide Risk Assessment:  Janice did not appear to be an imminent safety risk to self or others.    PROVIDER:  ALTHEA Ramey CNP              Pertinent Background  Candida notes history of depression and was started on Lexapro in her 20s. Anxiety developed after her pregnancies. Last experienced SI around 2021 in the context of marital problems and separation.   Psych pertinent item history includes suicidal ideation              History of Present Illness     Patient was previously followed by Dr. House in private practice.     Has been on venlafaxine for at least 15 years. Initially was effective \"to a point.\" Was increased to 300mg daily about 1.5 years ago. Currently is unsure as to whether the medication is providing benefit. She also experiences withdrawal with a missed dose. Discontinuation symptoms: brain zaps, flustered.     We discussed the following today:     Depression-  -Generally stable; tends to experience intermittent frustration and anger. Most recent depressive episode was a couple years ago after " "splitting with partner.   -When depression is bad she tends to spend a lot of time in bed crying.   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     Anxiety-  -Tends to worry about a variety of topics.  -\"I've always been kind of a worrier.\"   -Hx of panic attacks but none recently.     Sleep-  -Tends to have a difficult time falling asleep; lots of tossing and turning. Has a hard time shutting her anxious thoughts off at bedtime.  -Currently getting about 8 hours of sleep per night.   -Reports frequent fatigue. Adderall doesn't seem to help with this.   -She tends to snore.   -Every now and then will take melatonin which tends to help.     ADHD-  -Completed testing 7-10 years ago but unclear whether she was actually diagnosed with ADHD.  -Has been treated with Adderall for many years. Not currently taking . Doesn't recall the last time she took it.   -Adderall does help with motivation and productivity at work.     Stressors: Mom passed away January 2023.     Per [facility] visit with Gris Estrada formerly [facility] on 07/02/24:  \"Patient reported that depression and anxiety has been well managed on current medications. She has been taking them at current doses for multiple years. She has taken antidepressants since her 20s. Meds were changed a bit during pregnancy, but has taken venlafaxine for the last 15-20 years. Adderall has been quite helpful for concentration and focus and being able to function at work.   Current provider is in private practice, who has been increasingly difficult to contact and patient feels that appointments have not focused on her care. She recalls that \"doses just kept increasing\" when going through divorce and her mother's death. Pt is interested in reducing doses to what is necessary or if other medication may be preferred, but is very nervous to make med changes. She prefers to stay on current meds until psychiatry intake. She only has three weeks left of " "medication and psychiatry is in 6 weeks. She has been having lots of difficulty getting in touch with psychiatrist to obtain records.\"    Current psychotropic medications:  -venlafaxine ER 300mg (2 x150mg) every morning  -buspirone 30mg BID -   -Adderall ER 40mg (2 x 20mg) daily - hasn't been taking (unable to recall the last time she took this medication)    Medication ASE: Denies   Medication adherence: Denies concerns    Recent Psych Symptoms:   Depression:   amotivation, fatigue, anhedonia  Elevated:  none  Psychosis:  none  Anxiety:  excessive worry  Trauma Related:  none  Insomnia:  Yes: problems with sleep initiation  Other:  No    Pertinent Negatives: No suicidal or violent ideation, psychosis, or hypo/kemal.      Pertinent Social Hx:  FINANCIAL SUPPORT- Works in [employer] for [employer].  LIVING SITUATION / RELATIONSHIPS- Lives with 15-year-old son who splits time with his father. Currently involved with romantic partner. Feels safe and supported in this relationship.   SOCIAL/ SPIRITUAL SUPPORT- Partner, children    Pertinent Substance Use  Alcohol- Drinks beer on the weekends.   Nicotine- 1 PPD. Not quite ready to quit.   Caffeine- Daily tea drinker  Opioids- None  Narcan Kit- N/A  THC/CBD- Smokes cannabis about 3-4 times per week largely due to pain.   Other Illicit Drugs- none    Substance Use History  Past Use- Denies  Treatment [#, most recent]- None  Medical Consequences [withdrawal, sz etc]- None  Legal Consequences- None              Medical Review of Systems   Dizziness/orthostasis- Denies dizziness or recent falls.   Headaches- Hx of migraines; treated about 10-15 years ago for this. No recent problems.   Respiratory- Hx of asthma; uses rescue inhaler but tends to only use with URIs  Cardiovascular- History of \"heart fluttering.\" Occurs very rarely.   Gastrointestinal- Denies nausea, vomiting, constipation, diarrhea  Pain- chronic hip pain; receives cortisone injections  Sexual health " concerns- None. LMP January 2024.  A comprehensive review of systems was performed and is negative other than noted above.              Past Psychiatric History   Have you been previously diagnosed with any of these mental health condition(s)?: ADHD;an anxiety disorder;depression;a personality disorder What mental health services have you received in the past?: therapy;psychiatry Currently, are you receiving any of the following mental health services?: none    Self injurious behavior [method, most recent]- None  Suicide attempt [#, most recent, method]- None  Suicidal ideation [passive, active]- Endorses hx of passive SI; last occurred in 2021. Kids are significantly protective.   History of violence/aggression/HI- Denies history of violence towards others.   What in the following list protects you from causing harm to yourself or others?: forward or future oriented thinking;dedication to family or friends;safe and stable environment;regular sleep;sense of belonging;help seeking behaviors when distressed;adherence with prescribed medication;effective problem solving skills;commitment to well being;positive social skills;access to a variety of clinical interventions and pets, Are there firearms in your home?: are not    Psychosis hx- None  Psych hosp [ #, most recent, committed]- None  ECT/TMS [#, most recent]- None    Eating disorder hx- Denies  Trauma hx- Denies  Outpatient programs [Day treatment, DBT, eating disorder tx, etc]- Individual psychotherapy               Past Psychotropic Medications     Medications Max dose Dates/Duration Discontinuation Reason Other Notes   citalopram   1990s Pregnancy?     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 : , What is your sexual orientation?: heterosexual  Children- Do you have children?: yes, How many children do you have?: 2, Ages of boys:: 21, 15  Social/spiritual support- Who are the most supportive people in your life?  : friends  Cultural- What is your cultural background? : , What are ethnic, cultural, or Yazdanism influences that may be useful to know about you (for example history of experiencing discrimination, growing up rural/urban, valuing culturally specific treatments)?  : None, What is your preferred language?  : English  Education- What is your highest education? : some college  Early history- Where did you grow up?: other, If other, please list below:: [place], [place], Who took care of you as a child?: biological mother;stepfather  Raised by- How would you describe your parent's relationships?: one or both remarried, How old were you when they remarried?: 11  Siblings- Do you have siblings?: yes, How many half siblings do you have?: 1  Quality of family relationships- How would you describe your current family relationships?: good  Legal- Have you been involved with the legal system (child custody, order for protection, DWI, etc.)?: have not, Do you have a ?  : does not              Family History    Maternal side of the family: 17 y/o nephew  via suicide , uncle  via suicide when patient was 11 y/o.   Paternal cousin: bipolar disorder  Oldest son: anxiety              Past Medical History     Medication allergies:    Allergies   Allergen Reactions    Theophylline Cr     Zyban [Bupropion Hydrobromide]     Bupropion Rash     Neurologic Hx [head injury, seizures, etc.]: Denies hx of concussion or seizure.   Patient Active Problem List   Diagnosis    Intermittent asthma    Cervical high risk HPV (human papillomavirus) test positive    Papanicolaou smear of cervix with low grade squamous intraepithelial lesion (LGSIL)     Past Medical History:   Diagnosis Date    ADHD (attention deficit hyperactivity disorder)     sees psych     Arthritis 24    It s probably in my right hip too    Depressive disorder 1990    Diabetes (H) 2008    Gestational 2008    Family history of colon cancer     Intermittent asthma     Mild major depression (H24)     sees psych      Contraception- Depo Provera              Medications   Current Outpatient Medications   Medication Sig Dispense Refill    albuterol (PROAIR HFA/PROVENTIL HFA/VENTOLIN HFA) 108 (90 Base) MCG/ACT inhaler Inhale 2 puffs into the lungs every 6 hours 18 g 3    amphetamine-dextroamphetamine (ADDERALL XR) 20 MG 24 hr capsule Take 2 capsules (40 mg) by mouth daily 60 capsule 0    bisacodyl (DULCOLAX) 5 MG EC tablet Two days prior to exam take two (2) tablets at 4pm. One day prior to exam take two (2) tablets at 4pm 4 tablet 0    busPIRone HCl (BUSPAR) 30 MG tablet Take 1 tablet (30 mg) by mouth 2 times daily 60 tablet 0    nicotine (NICODERM CQ) 14 MG/24HR 24 hr patch Place 1 patch onto the skin every 24 hours      nicotine (NICODERM CQ) 21 MG/24HR 24 hr patch Place 1 patch onto the skin every 24 hours      nicotine (NICODERM CQ) 7 MG/24HR 24 hr patch Place 1 patch onto the skin every 24 hours      nicotine polacrilex (NICORETTE) 4 MG gum How to take it:  "When the urge to smoke occurs, chew gum until you feel a tingle, then \"park\" the gum in your cheek until the tingle is gone. Re-chew every few minutes and \"park\" again, chewing one piece for 30 minutes. Do not eat or drink while chewing. Follow this schedule: Weeks 1 to 6: One piece of gum every 1 to 2 hours. Use at least 9 pieces a day, but no more than 24. Weeks 7 to 9: One piece of gum every 2 to 4 hours. Weeks 10 to 12: One piece of gum every 4 to 8 hours.      polyethylene glycol (GOLYTELY) 236 g suspension Two days before procedure at 5PM fill first container with water. Mix and drink an 8 oz glass every 10-15 minutes until HALF of the container is gone. Place the remainder in the refrigerator. One day before procedure at 5PM drink second half of bowel prep. Drink an 8 oz glass every 10-15 minutes until it is gone. Day of procedure 6 hours before arrival time fill the 2nd container with water. Mix and drink an 8 oz glass every 10-15 minutes until HALF of the container is gone. Discard the remaining solution. 8000 mL 0    venlafaxine (EFFEXOR XR) 150 MG 24 hr capsule Take 2 capsules (300 mg) by mouth daily 60 capsule 0                 Physical Exam  (Vitals Only)  There were no vitals taken for this visit.    Pulse Readings from Last 5 Encounters:   06/25/24 92   06/06/24 92   12/26/23 81   06/20/23 78   06/20/23 81     Wt Readings from Last 5 Encounters:   06/25/24 106.4 kg (234 lb 9.6 oz)   06/06/24 105.2 kg (232 lb)   12/26/23 107 kg (236 lb)   10/21/22 98.4 kg (217 lb)   07/19/22 98.8 kg (217 lb 12.8 oz)     BP Readings from Last 5 Encounters:   06/25/24 135/81   06/06/24 123/70   12/26/23 132/83   06/20/23 101/80   10/28/22 (!) 128/92                 Mental Status Exam  Alertness: alert  and oriented  Appearance: adequately groomed  Behavior/Demeanor: cooperative, pleasant, and calm, with good eye contact   Speech: normal and regular rate and rhythm  Language: intact and no problems  Psychomotor: normal or " "unremarkable  Mood:  \"deng at times\"  Affect: full range and appropriate; was congruent to mood  Thought Process/Associations: unremarkable  Thought Content:  Reports none;  Denies suicidal ideation, violent ideation, and delusions  Perception:  Reports none;  Denies auditory hallucinations and visual hallucinations  Insight: good  Judgment: good  Cognition: does  appear grossly intact; formal cognitive testing was not done  oriented: time, person, and place  Gait and Station:  N/A (telehealth)                Data       2024     8:11 AM   PROMIS-10 Total Score w/o Sub Scores   PROMIS TOTAL - SUBSCORES 28         2024     8:12 AM   CAGE-AID Total Score   Total Score 0   Total Score MyChart 0 (A total score of 2 or greater is considered clinically significant)         2022     9:10 AM 2024     9:44 AM   PHQ   PHQ-9 Total Score 3 4   Q9: Thoughts of better off dead/self-harm past 2 weeks Not at all Not at all         2022     9:10 AM   SERGIO-7 SCORE   Total Score 4         Liver/kidney function Metabolic Blood counts   Recent Labs   Lab Test 24  0821 22  0845   CR 0.93 0.82   AST 16 4   ALT 16 22   ALKPHOS 99 75    Recent Labs   Lab Test 24  0821   CHOL 207*   TRIG 106   *   HDL 54   A1C 5.6    No lab results found.     No results found for this or any previous visit.    Administrative Billin minutes spent on the date of the encounter doing chart review and reviewing referral documents, history and exam of patient, documentation and further activities as noted above.    Video/Phone Start Time: 958   Video/Phone End Time:      The longitudinal plan of care for the diagnosis(es)/condition(s) as documented above were addressed during this visit. Due to the added complexity in care, I will continue to support Janice in the subsequent management and with ongoing continuity of care.  "

## 2024-08-16 NOTE — PATIENT INSTRUCTIONS
Medication Plan  -Continue melatonin 3mg nightly (take at least 1 hour before intended bedtime)  -Decrease venlafaxine to 262.5mg daily  -Continue Buspar 30mg twice daily   -Hold Adderall     **For crisis resources, please see the information at the end of this document**   Patient Education    Thank you for coming to the [facility] Page CLINIC.     Lab Testing:  If you had lab testing today and your results are reassuring or normal they will be mailed to you or sent through [employer] within 7 days. If the lab tests need quick action we will call you with the results. The phone number we will call with results is # [phone]. If this is not the best number please call our clinic and change the number.     Medication Refills:  If you need any refills please call your pharmacy and they will contact us. Our fax number for refills is [phone].   Three business days of notice are needed for general medication refill requests.   Five business days of notice are needed for controlled substance refill requests.   If you need to change to a different pharmacy, please contact the new pharmacy directly. The new pharmacy will help you get your medications transferred.     Contact Us:  Please call [phone] during business hours (8-5:00 M-F).   If you have medication related questions after clinic hours, or on the weekend, please call [phone].     Financial Assistance [phone]   Medical Records [phone]       MENTAL HEALTH CRISIS RESOURCES:  For a emergency help, please call 911 or go to the nearest Emergency Department.     Emergency Walk-In Options:   EmPATH Unit @ [facility] ([place]): [phone] - Specialized mental health emergency area designed to be calming  [facility] ([place]): [phone]  [facility] ([place]): [phone]  [facility]): [phone]    [facility] Crisis Information:    [place]: [phone]  [place]: [phone]  Saurabh (ANGELA) - Adult: [phone]     Child: [phone]  [place] - Adult: [phone]     Child: [phone]  [place]: [phone]  List of all [facility] resources:   https://[place].gov/dhs/people-we-serve/adults/health-care/mental-health/resources/crisis-contacts.jsp    National Crisis Information:   Crisis Text Line: Text  [place]  to 700882  Suicide & Crisis Lifeline: 988  National Suicide Prevention Lifeline: 6-574-827-TALK (1-247.172.8417)       For online chat options, visit https://suicidepreventionlifeline.org/chat/  Poison Control Center: 9-494-637-9479  Trans Lifeline: 1-361.987.9108 - Hotline for transgender people of all ages  The [employer] Project: 6-968-426-1265 - Hotline for LGBT youth     For Non-Emergency Support:   Fast Tracker: Mental Health & Substance Use Disorder Resources -   https://www.[facility] HealthtrackRevolucionadolabsn.org/

## 2024-08-20 ENCOUNTER — TELEPHONE (OUTPATIENT)
Dept: PSYCHIATRY | Facility: CLINIC | Age: 54
End: 2024-08-20
Payer: COMMERCIAL

## 2024-08-20 DIAGNOSIS — F41.1 GAD (GENERALIZED ANXIETY DISORDER): ICD-10-CM

## 2024-08-20 DIAGNOSIS — F33.1 MDD (MAJOR DEPRESSIVE DISORDER), RECURRENT EPISODE, MODERATE (H): Primary | ICD-10-CM

## 2024-08-20 RX ORDER — VENLAFAXINE HYDROCHLORIDE 150 MG/1
150 CAPSULE, EXTENDED RELEASE ORAL DAILY
Qty: 30 CAPSULE | Refills: 0 | Status: SHIPPED | OUTPATIENT
Start: 2024-08-20 | End: 2024-09-23

## 2024-08-20 RX ORDER — VENLAFAXINE HYDROCHLORIDE 75 MG/1
75 CAPSULE, EXTENDED RELEASE ORAL DAILY
Qty: 30 CAPSULE | Refills: 0 | Status: SHIPPED | OUTPATIENT
Start: 2024-08-20 | End: 2024-09-23

## 2024-08-20 RX ORDER — VENLAFAXINE HYDROCHLORIDE 37.5 MG/1
37.5 CAPSULE, EXTENDED RELEASE ORAL DAILY
Qty: 30 CAPSULE | Refills: 0 | Status: SHIPPED | OUTPATIENT
Start: 2024-08-20 | End: 2024-09-23 | Stop reason: DRUGHIGH

## 2024-08-20 NOTE — TELEPHONE ENCOUNTER
Called pharmacy at Pharmacy: [id number] - [place], [place] - [address]       They confirmed that the 3 new scripts were able to be processed.      Called the patient and updated her.  She had no further questions.

## 2024-08-20 NOTE — TELEPHONE ENCOUNTER
"[facility]    Phone Message    May a detailed message be left on voicemail: yes     Reason for Call: Medication Question or concern regarding medication   Prescription Clarification  Name of Medication: Venlafaxine 225mg/37.5mg   Prescribing Provider: Monet Knott   Pharmacy: [id number] - [place] & Replaced by [facility] [address]    What on the order needs clarification? Caller stated that she saw Monet on Friday and Monet reduced the dosage for the Venlafaxine. Pharmacy is saying there are insurance issues. Caller stated that \"insurance doesn't want to cover the tablets\" and would like to see if perhaps Monet may be able to change the prescription in a way that would work for insurance because the medication out of pocket is $200 a month. Caller stated that this is an urgent matter because she has no more pills as of this morning.      Action Taken: Other: [facility] Psychiatry Nurse [place]    Travel Screening: Not Applicable     Date of Service: 8/20/24                                                                     "

## 2024-08-20 NOTE — TELEPHONE ENCOUNTER
Called [id number]  [place], [place] - [address] AT [place] [address] pharmacy.  Confirmed that patient's insurance does not cover tablets, only the capsule form.  Noted there is no capsule form of venlafaxine (EFFEXOR-ER) 225 MG 24 hr tablet.    Started Prior authorization through [facility] for both the venlafaxine (EFFEXOR-ER) 225 MG 24 hr tablet  and     venlafaxine (EFFEXOR-ER) 37.5 MG 24 hr tablet    .    Notified provider.

## 2024-08-20 NOTE — TELEPHONE ENCOUNTER
I sent in a new rx for venlafaxine capsules (37.5mg + 75mg + 150mg). I've asked my nurse partner to contact the pharmacy to see if this new rx goes through. If insurance will not cover this prescription (the goal is to reduce venlafaxine by 37.5mg daily as pt is prone to discontinuation symptoms) then we will need to considering reducing daily dose by 75mg to 225mg daily prescribed in capsule formulation as insurance does not cover tablets.     ALTHEA Ramey CNP on 8/20/2024 at 12:36 PM

## 2024-09-04 ENCOUNTER — TELEPHONE (OUTPATIENT)
Dept: GASTROENTEROLOGY | Facility: CLINIC | Age: 54
End: 2024-09-04
Payer: COMMERCIAL

## 2024-09-04 NOTE — TELEPHONE ENCOUNTER
Caller: call to Cheyenne Schulte      Reason for Reschedule/Cancellation   (please be detailed, any staff messages or encounters to note?): provider out of office      Prior to reschedule please review:  Ordering Provider: PHUONG  Sedation Determined: MAC  Does patient have any ASC Exclusions, please identify?: NO      Notes on Cancelled Procedure:  Procedure: Lower Endoscopy [Colonoscopy]   Date: 10/2/24  Location: [facility]; [address], [place] [address]  Surgeon: DONI      Rescheduled: Yes,   Procedure: Lower Endoscopy [Colonoscopy]    Date: 11/6/24   Location: [facility]; [address], [place] [address]   Surgeon: DONI   Sedation Level Scheduled  MAC ,  Reason for Sedation Level PER RN REVIEW   Instructions updated and sent: Y     Does patient need PAC or Pre -Op Rescheduled? : N       Did you cancel or rescheduled an EUS procedure? No.

## 2024-09-22 DIAGNOSIS — F33.1 MDD (MAJOR DEPRESSIVE DISORDER), RECURRENT EPISODE, MODERATE (H): ICD-10-CM

## 2024-09-22 DIAGNOSIS — F41.1 GAD (GENERALIZED ANXIETY DISORDER): ICD-10-CM

## 2024-09-22 RX ORDER — VENLAFAXINE HYDROCHLORIDE 150 MG/1
150 CAPSULE, EXTENDED RELEASE ORAL DAILY
Qty: 30 CAPSULE | Refills: 0 | Status: CANCELLED | OUTPATIENT
Start: 2024-09-22

## 2024-09-22 RX ORDER — VENLAFAXINE HYDROCHLORIDE 75 MG/1
75 CAPSULE, EXTENDED RELEASE ORAL DAILY
Qty: 30 CAPSULE | Refills: 0 | Status: CANCELLED | OUTPATIENT
Start: 2024-09-22

## 2024-09-22 NOTE — PROGRESS NOTES
Virtual Visit Details    Type of service:  Video Visit     Originating Location (pt. Location): Home    Distant Location (provider location):  On-site  Platform used for Video Visit: [facility]  NEW PATIENT DIAGNOSTIC ASSESSMENT       Cheyenne Schulte is a 54 year old who prefers the name Janice    Initial consultation on 08/16/24.      CARE TEAM:   PCP- [facility]  Therapist- None              Assessment & Plan     History and interview support the following diagnoses:   Generalized anxiety disorder  Major depressive disorder, recurrent, moderate    Janice is a 54-year-old adult with psychiatric history of depression, anxiety, and ADHD (by history) who presents for psychiatric follow-up. Patient was seen on 08/16/24 for an initial consultation. At that visit we decreased venlafaxine from 300mg to 262.5mg daily.     Today, Janice is doing well. Mood is euthymic and anxiety is well managed. She tolerated the dose reduction from venlafaxine 300mg to 262.5mg without issue. Symptoms have been stable and improved over the last few weeks. We discussed holding at current regimen versus continuing with slight reduction in venlafaxine which was patient's original goal. Janice opts to try further dose reduction of venlafaxine to 225mg daily. She will closely monitor for recurrence of symptoms and contact clinic with any concerns. Will remain off Adderall at this time as patient carries no previous dx of ADHD. Janice is in agreement with plan.     Assessment from initial consultation on 08/16/24:  Overall Janice is doing well. Her most recent major depressive episode was several years ago after  from ex-. She does endorse mild symptoms of suboptimally treated depression including anhedonia and amotivation and there is room for improvement in his area. She denies SI/HI/NSSI/AH/VH. Substance use, which includes regular  cannabis use and alcohol use on the weekends, does not appear to be impacting the clinical picture. She completed ADHD testing several years ago and agrees to forward these results to clinic if she is able to locate a copy of the testing.     Psychotherapy discussion: Primary recommendation for the treatment of mood symptoms, especially anxiety. Supportive therapy can be useful for reducing the impact of acute or chronic psychosocial stressors. CBT can be particularly helpful for ruminative thinking and addressing maladaptive coping mechanisms that may worsen anxiety. Referral for therapy was placed today, 08/16/24.     Medication discussion: Janice notes that she is interested in reducing venlafaxine. Overall she feels that her previous provider just kept increasing her medications and she is unsure as to whether she is on the most effective regimen. She denies ASE from current medications but is prone to discontinuation symptoms with venlafaxine. She is prescribed higher doses of Buspar, Effexor, and Adderall (although she hasn't been taking Adderall for quite some time; unable to recall the last time she took a dose). Given current serotonergic load, patient may experience symptom improvement with moderate venlafaxine dose reduction. Further, her blood pressures, although stable, have more recently been on the higher end, another reason to move forward with a dose reduction.     Plan is to first reduce venlafaxine and monitor for worsening of anxiety or depressive symptoms. We also discussed the potential for discontinuation symptoms which she has experienced in the past with previous missed doses. Will plan to reduce by increment of 37.5mg to reduce risk of discontinuation symptoms. Patient in agreement with plan.     PSYCHOTROPIC DRUG INTERACTIONS: **Italicized interactions are for treatment plan options not currently implemented**  ADDITIVE SEROTONERGIC: Buspar, venlafaxine, Adderall    MANAGEMENT:  use lowest  therapeutic doses of psychotropic medications, routine monitoring, and MTM completed  07/02/24    [place] was [place] today: indicates no dispenses of Adderall over the past 1 year              Plan    1) PSYCHOTROPIC MEDICATION RECOMMENDATIONS:  -Continue melatonin 3mg nightly (take at least 1 hour before intended bedtime)  -Decrease venlafaxine to 225mg daily  -Continue Buspar 30mg twice daily     2) THERAPY: Psychotherapy is a primary recommendation. Referral placed 08/16/24).     3) NEXT DUE:   Labs- Routine monitoring is not indicated for current psychotropic medication regimen   ECG- Routine monitoring is not indicated for current psychotropic medication regimen   Rating Scales- N/A    4) REFERRALS / COORDINATION: Therapy referral through [facility] (prefers female)    5) DISPOSITION: 10/31/24 at     Treatment Risk Statement:  The patient understands the risks, benefits, adverse effects and alternatives. Agrees to treatment with the capacity to do so. No medical contraindications to treatment. Agrees to contact provider for any problems. The patient understands to call 911 or go to the nearest ED if urgent or life threatening symptoms occur. Crisis contact numbers are provided routinely in the After Visit Summary.    Suicide Risk Assessment:  Janice did not appear to be an imminent safety risk to self or others.    PROVIDER:  ALTHEA Ramey CNP              Pertinent Background  Candida notes history of depression and was started on Lexapro in her 20s. Anxiety developed after her pregnancies. Last experienced SI around 2021 in the context of marital problems and separation.   Psych pertinent item history includes suicidal ideation              History of Present Illness     Patient was seen on 08/16/24 for an initial consultation. At that visit we decreased venlafaxine from 300mg to 262.5mg daily. Since the last visit patient reports the following:  -The first couple of weeks following  "venlafaxine decrease she felt a \"bit weird\" but this resolved after 1-2 weeks.  -Mood and anxiety have been \"fine.\" No notable changes. Things are going well. Level of worry has improved.  -Dealing with some increased stressors with her oldest child however feels that anxiety has remained relatively stable.     Stressors: Mom passed away January 2023.     Medication ASE: Denies   Medication adherence: Denies concerns    Recent Psych Symptoms:   Depression:   Denies  Elevated:  none  Psychosis:  none  Anxiety:  excessive worry  Trauma Related:  none  Insomnia:  Yes: problems with sleep initiation  Other:  No    Pertinent Negatives: No suicidal or violent ideation, psychosis, or hypo/kemal.      Pertinent Social Hx:  FINANCIAL SUPPORT- Works in [email or web address].  LIVING SITUATION / RELATIONSHIPS- Lives with 15-year-old son who splits time with his father. Currently involved with romantic partner. Feels safe and supported in this relationship.   SOCIAL/ SPIRITUAL SUPPORT- Partner, children    Pertinent Substance Use  Alcohol- Drinks beer on the weekends.   Nicotine- 1 PPD. Not quite ready to quit.   Caffeine- Daily tea drinker  Opioids- None  Narcan Kit- N/A  THC/CBD- Smokes cannabis about 3-4 times per week largely due to pain.   Other Illicit Drugs- none              Medical Review of Systems   Dizziness/orthostasis- Denies dizziness or recent falls.   Headaches- Hx of migraines; treated about 10-15 years ago for this. No recent problems.   Respiratory- Hx of asthma; uses rescue inhaler but tends to only use with URIs  Cardiovascular- History of \"heart fluttering.\" Occurs very rarely.   Gastrointestinal- Denies nausea, vomiting, constipation, diarrhea  Pain- chronic hip pain; receives cortisone injections  Sexual health concerns- None. LMP January 2024.  A comprehensive review of systems was performed and is negative other than noted above.              Psych Summary Points  08/2024: Initial " consultation 08/2024; decreased venlafaxine from 300mg to 262.5mg daily  09/2024: Decreased venlafaxine from 262.5 to 225mg daily              Past Psychotropic Medications     Medications Max dose Dates/Duration Discontinuation Reason Other Notes   citalopram   1990s Pregnancy?     bupropion   Early 2000s Wasn't sleeping for 5 days, then developed rash Used to quit smoking   Escitalopram    1990s                     Past Medical History     Medication allergies:    Allergies   Allergen Reactions    Theophylline Cr     Zyban [Bupropion Hydrobromide]     Bupropion Rash     Neurologic Hx [head injury, seizures, etc.]: Denies hx of concussion or seizure.   Patient Active Problem List   Diagnosis    Intermittent asthma    Cervical high risk HPV (human papillomavirus) test positive    Papanicolaou smear of cervix with low grade squamous intraepithelial lesion (LGSIL)     Past Medical History:   Diagnosis Date    ADHD (attention deficit hyperactivity disorder)     sees psych     Arthritis 5/26/23    It s probably in my right hip too    Depressive disorder 1990 s    Diabetes (H) 2008    Gestational 2008    Family history of colon cancer     Intermittent asthma     Mild major depression (H24)     sees psych      Contraception- Depo Provera              Medications   Current Outpatient Medications   Medication Sig Dispense Refill    busPIRone HCl (BUSPAR) 30 MG tablet Take 1 tablet (30 mg) by mouth 2 times daily. 60 tablet 1    venlafaxine (EFFEXOR XR) 150 MG 24 hr capsule Take 1 capsule (150 mg) by mouth daily. Take along with a 75mg capsule for a total daily dose of 225mg 30 capsule 1    venlafaxine (EFFEXOR XR) 75 MG 24 hr capsule Take 1 capsule (75 mg) by mouth daily. Take along with with a 150mg capsule for a total daily dose of 225mg 30 capsule 1    albuterol (PROAIR HFA/PROVENTIL HFA/VENTOLIN HFA) 108 (90 Base) MCG/ACT inhaler Inhale 2 puffs into the lungs every 6 hours 18 g 3    bisacodyl (DULCOLAX) 5 MG EC tablet  "Two days prior to exam take two (2) tablets at 4pm. One day prior to exam take two (2) tablets at 4pm 4 tablet 0    nicotine (NICODERM CQ) 14 MG/24HR 24 hr patch Place 1 patch onto the skin every 24 hours      nicotine (NICODERM CQ) 21 MG/24HR 24 hr patch Place 1 patch onto the skin every 24 hours      nicotine (NICODERM CQ) 7 MG/24HR 24 hr patch Place 1 patch onto the skin every 24 hours      nicotine polacrilex (NICORETTE) 4 MG gum How to take it: When the urge to smoke occurs, chew gum until you feel a tingle, then \"park\" the gum in your cheek until the tingle is gone. Re-chew every few minutes and \"park\" again, chewing one piece for 30 minutes. Do not eat or drink while chewing. Follow this schedule: Weeks 1 to 6: One piece of gum every 1 to 2 hours. Use at least 9 pieces a day, but no more than 24. Weeks 7 to 9: One piece of gum every 2 to 4 hours. Weeks 10 to 12: One piece of gum every 4 to 8 hours.      polyethylene glycol (GOLYTELY) 236 g suspension Two days before procedure at 5PM fill first container with water. Mix and drink an 8 oz glass every 10-15 minutes until HALF of the container is gone. Place the remainder in the refrigerator. One day before procedure at 5PM drink second half of bowel prep. Drink an 8 oz glass every 10-15 minutes until it is gone. Day of procedure 6 hours before arrival time fill the 2nd container with water. Mix and drink an 8 oz glass every 10-15 minutes until HALF of the container is gone. Discard the remaining solution. 8000 mL 0                 Physical Exam  (Vitals Only)  There were no vitals taken for this visit.    Pulse Readings from Last 5 Encounters:   06/25/24 92   06/06/24 92   12/26/23 81   06/20/23 78   06/20/23 81     Wt Readings from Last 5 Encounters:   06/25/24 106.4 kg (234 lb 9.6 oz)   06/06/24 105.2 kg (232 lb)   12/26/23 107 kg (236 lb)   10/21/22 98.4 kg (217 lb)   07/19/22 98.8 kg (217 lb 12.8 oz)     BP Readings from Last 5 Encounters:   06/25/24 135/81 " "  06/06/24 123/70   12/26/23 132/83   06/20/23 101/80   10/28/22 (!) 128/92                 Mental Status Exam  Alertness: alert  and oriented  Appearance: adequately groomed  Behavior/Demeanor: cooperative, pleasant, and calm, with good eye contact   Speech: normal and regular rate and rhythm  Language: intact and no problems  Psychomotor: normal or unremarkable  Mood:  \"deng at times\"  Affect: full range and appropriate; was congruent to mood  Thought Process/Associations: unremarkable  Thought Content:  Reports none;  Denies suicidal ideation, violent ideation, and delusions  Perception:  Reports none;  Denies auditory hallucinations and visual hallucinations  Insight: good  Judgment: good  Cognition: does  appear grossly intact; formal cognitive testing was not done  oriented: time, person, and place  [facility] and Station:  [facility]                Data       8/12/2024     8:11 AM   PROMIS-10 Total Score w/o Sub Scores   PROMIS TOTAL - SUBSCORES 28         8/12/2024     8:12 AM   CAGE-AID Total Score   Total Score 0   Total Score MyChart 0 (A total score of 2 or greater is considered clinically significant)         5/26/2022     9:10 AM 8/16/2024     9:44 AM   PHQ   PHQ-9 Total Score 3 4   Q9: Thoughts of better off dead/self-harm past 2 weeks Not at all Not at all         5/26/2022     9:10 AM   SERGIO-7 SCORE   Total Score 4       Liver/kidney function Metabolic Blood counts   Recent Labs   Lab Test 06/06/24  0821 06/07/22  0845   CR 0.93 0.82   AST 16 4   ALT 16 22   ALKPHOS 99 75    Recent Labs   Lab Test 06/06/24  0821   CHOL 207*   TRIG 106   *   HDL 54   A1C 5.6    No lab results found.     No results found for this or any previous visit.    Administrative Billing:     Level of Medical Decision Making:   - At least 1 chronic problem that is not stable  - Engaged in prescription drug management during visit (discussed any medication benefits, side effects, alternatives, etc.)    The longitudinal plan " of care for the diagnosis(es)/condition(s) as documented above were addressed during this visit. Due to the added complexity in care, I will continue to support Janice in the subsequent management and with ongoing continuity of care.

## 2024-09-23 ENCOUNTER — VIRTUAL VISIT (OUTPATIENT)
Dept: PSYCHIATRY | Facility: CLINIC | Age: 54
End: 2024-09-23
Payer: COMMERCIAL

## 2024-09-23 DIAGNOSIS — F41.1 GAD (GENERALIZED ANXIETY DISORDER): ICD-10-CM

## 2024-09-23 DIAGNOSIS — F33.1 MDD (MAJOR DEPRESSIVE DISORDER), RECURRENT EPISODE, MODERATE (H): ICD-10-CM

## 2024-09-23 PROCEDURE — G2211 COMPLEX E/M VISIT ADD ON: HCPCS | Mod: 95

## 2024-09-23 PROCEDURE — 99213 OFFICE O/P EST LOW 20 MIN: CPT | Mod: 95

## 2024-09-23 RX ORDER — VENLAFAXINE HYDROCHLORIDE 150 MG/1
150 CAPSULE, EXTENDED RELEASE ORAL DAILY
Qty: 30 CAPSULE | Refills: 1 | Status: SHIPPED | OUTPATIENT
Start: 2024-09-23

## 2024-09-23 RX ORDER — VENLAFAXINE HYDROCHLORIDE 75 MG/1
75 CAPSULE, EXTENDED RELEASE ORAL DAILY
Qty: 30 CAPSULE | Refills: 1 | Status: SHIPPED | OUTPATIENT
Start: 2024-09-23

## 2024-09-23 RX ORDER — BUSPIRONE HYDROCHLORIDE 30 MG/1
30 TABLET ORAL 2 TIMES DAILY
Qty: 60 TABLET | Refills: 1 | Status: SHIPPED | OUTPATIENT
Start: 2024-09-23

## 2024-09-23 ASSESSMENT — PAIN SCALES - GENERAL: PAINLEVEL: NO PAIN (0)

## 2024-09-23 NOTE — NURSING NOTE
Current patient location: [address]   [facility] [address]    Is the patient currently in the state of [place]? YES    Visit mode:VIDEO    If the visit is dropped, the patient can be reconnected by: VIDEO VISIT: Send to e-mail at: [email or web address]    Will anyone else be joining the visit? NO  (If patient encounters technical issues they should call [phone])    How would you like to obtain your AVS? MyChart    Are changes needed to the allergy or medication list? No    Are refills needed on medications prescribed by this physician? Would like to discuss medications before getting refills    Rooming Documentation:  Questionnaire(s) completed    Reason for visit: RECHECK    Gladis VILLAF

## 2024-09-23 NOTE — PATIENT INSTRUCTIONS
Medication Plan  -Continue melatonin 3mg nightly (take at least 1 hour before intended bedtime)  -Decrease venlafaxine to 225mg daily  -Continue Buspar 30mg twice daily     **For crisis resources, please see the information at the end of this document**   Patient Education    Thank you for coming to the [facility].     Lab Testing:  If you had lab testing today and your results are reassuring or normal they will be mailed to you or sent through Dial[employer] within 7 days. If the lab tests need quick action we will call you with the results. The phone number we will call with results is # [phone]. If this is not the best number please call our clinic and change the number.     Medication Refills:  If you need any refills please call your pharmacy and they will contact us. Our fax number for refills is [phone].   Three business days of notice are needed for general medication refill requests.   Five business days of notice are needed for controlled substance refill requests.   If you need to change to a different pharmacy, please contact the new pharmacy directly. The new pharmacy will help you get your medications transferred.     Contact Us:  Please call [phone] during business hours (8-5:00 M-F).   If you have medication related questions after clinic hours, or on the weekend, please call [phone].     Financial Assistance [phone]   Medical Records [phone]       MENTAL HEALTH CRISIS RESOURCES:  For a emergency help, please call 911 or go to the nearest Emergency Department.     Emergency Walk-In Options:   Em[facility] Unit @ [facility] ([place]): [phone] - Specialized mental health emergency area designed to be calming  [facility] ([place]): [phone]  [facility] ([place]): [phone]  [facility] ([place]): [phone]    [facility] Crisis Information:   [place]:  [phone]  [place]: [phone]  Saurabh (COPE) - Adult: [phone]     Child: [phone]  [place] - Adult: [phone]     Child: [phone]  [place]: [phone]  List of all [facility] resources:   https://[place].gov/dhs/people-we-serve/adults/health-care/mental-health/resources/crisis-contacts.jsp    National Crisis Information:   Crisis Text Line: Text  [place]  to 413336  Suicide & Crisis Lifeline: 988  National Suicide Prevention Lifeline: 0-432-929-TALK (1-885.831.2812)       For online chat options, visit https://suicidepreventionlifeline.org/chat/  Poison Control Center: 7-141-587-6988  Trans Lifeline: 1-379.577.4992 - Hotline for transgender people of all ages  The [employer] Project: 4-247-966-2581 - Hotline for LGBT youth     For Non-Emergency Support:   Fast Tracker: Mental Health & Substance Use Disorder Resources -   https://www.[employer].org/

## 2024-09-24 ENCOUNTER — PRE VISIT (OUTPATIENT)
Dept: PSYCHIATRY | Facility: CLINIC | Age: 54
End: 2024-09-24
Payer: COMMERCIAL

## 2024-09-24 NOTE — TELEPHONE ENCOUNTER
"SERVICES REQUESTED / INTERESTED IN          Individual Psychotherapy     Presenting Problem and Brief History                              What would you like to work on in therapy? Depression & Anxiety, especially anxiety and how pt reacts to it.  What are you looking for in a therapist? Nothing specific  What does your support system look like?  from , not much support there. Pt is currently seeing someone and states they are \"pretty good about stuff\".   Have you done therapy before? Yes, a long time ago              When? 15-20 years             Provider? Pt cannot recall             Modality, if known: Pt cannot recall             What did or didn't work for you in the past with therapy? N/A     Substance Use History     Do you have any history of alcohol / illicit drug use? No  Describe:  N/A  Have you ever received treatment for this? N/A    Describe:  N/A     Social History     Who is the patient's a guardian? Pt is own legal guardian    Name / number: N/A  Have you had an ACT team in last 12 months? No  Describe: N/A  OK to leave a detailed voicemail?  Yes    9/24/24 Phone screen completed, patient scheduled with Dr. Mari Foster on Thursday, 10/3/24 at 4pm for a virtual appointment. New patient paperwork sent to patient via email on file.   Complex , Nickie Sánchez  "

## 2024-10-01 ENCOUNTER — TELEPHONE (OUTPATIENT)
Dept: PSYCHIATRY | Facility: CLINIC | Age: 54
End: 2024-10-01
Payer: COMMERCIAL

## 2024-10-01 NOTE — TELEPHONE ENCOUNTER
Writer called patient's pharmacy and they are currently working on filling venlafaxine prescriptions. Writer called patient to update. No further action required.

## 2024-10-01 NOTE — TELEPHONE ENCOUNTER
[facility] Call Center    Phone Message    May a detailed message be left on voicemail: yes     Reason for Call: Medication Refill Request    Has the patient contacted the pharmacy for the refill? Yes   Name of medication being requested: Venlafaxine 150mg/75mg  Provider who prescribed the medication: Monet Knott   Pharmacy: [id number] - [place], [place] - [address]   Date medication is needed: Patient called because she had an appointment with Monet last week and saw the medication orders went through, but now it has disappeared. Patient had some extra medication to take but has now ran out of that.     Action Taken: Other: [facility]     Travel Screening: Not Applicable     Date of Service: 10/1/24

## 2024-10-02 ENCOUNTER — TRANSFERRED RECORDS (OUTPATIENT)
Dept: HEALTH INFORMATION MANAGEMENT | Facility: CLINIC | Age: 54
End: 2024-10-02
Payer: COMMERCIAL

## 2024-10-03 ENCOUNTER — VIRTUAL VISIT (OUTPATIENT)
Dept: PSYCHIATRY | Facility: CLINIC | Age: 54
End: 2024-10-03
Attending: PSYCHOLOGIST
Payer: COMMERCIAL

## 2024-10-03 DIAGNOSIS — F41.1 GAD (GENERALIZED ANXIETY DISORDER): ICD-10-CM

## 2024-10-03 DIAGNOSIS — F33.1 MDD (MAJOR DEPRESSIVE DISORDER), RECURRENT EPISODE, MODERATE (H): ICD-10-CM

## 2024-10-03 PROCEDURE — 90791 PSYCH DIAGNOSTIC EVALUATION: CPT | Mod: 95

## 2024-10-03 NOTE — CONFIDENTIAL NOTE
"   [facility]    OUTPATIENT PSYCHOTHERAPY DIAGNOSTIC ASSESSMENT    Patient: Cheyenne Schulte (1970), MRN: [id number]    Diagnosis:   1. MDD (major depressive disorder), recurrent episode, moderate (H)    2. SERGIO (generalized anxiety disorder)       Provider: RACHEAL REESE MD  Date of Service:  10/03/2024    Service: 21391 - Psychotherapy (with patient) - 60 (53+*) min  Start Time: 4:00 pm End Time: 5:04 pm  In- Person at the [facility]: No    Extended Session (60+ minutes): No  Interactive Complexity: No  Crisis: No    Video Visit Details:   Telemedicine Visit: The patient's condition can be safely assessed and treated via synchronous audio and visual telemedicine encounter.  Reason for Video Visit: Patient has requested telehealth visit  Originating location (patient location):  [facility]   Location- Patient's home  Distant Site (provider location): HIPAA compliant [facility] MENTAL [facility] ADDICTION [facility]  Platform used for video visit:  Secure real time interactive audio and visual telecommunication system via FoxyP2    Individuals Present:   Client attended alone    Identifying Data:  Cheyenne Schulte is a 54 year old female who prefers the name of \"Janice\"     Sources of Information: gathered by clinical interview, self-report forms, and chart review.  The patient was a good historian.    CHIEF COMPLAINT     \"Bad day\"      HISTORY OF PRESENT ILLNESS        Janice joins virtually from her apartment in [place]. Had been upset and crying right before meeting as she got some bad news regarding her hip surgery. The surgery center she wants to schedule her hip replacement through is very strict with BMI requirements and let her know that she will have to lose 10 lbs before she can have surgery. Historically losing weight has been impossible for her so this news feels like a huge barrier. She also has " so much pain with her hip that it is difficult for her to get adequate exercise. She is also fearful of injuring herself further. Reports that October of last year she stumbled as result of her hip giving out then fell and broke her elbow. She is feeling a bit overwhelmed now with the thought of both quitting cigarettes and figuring out weight loss strategies to prepare for surgery.    Reports that mood prior to today has been generally 'great.' She has previous diagnoses of major depression and generalized anxiety. Recently switched psychiatrists and has made a lot of progress overall while going down on her effexor dose. Feels that previous provider only had one solution, which was to increase her medications. She was treated for ADHD with adderall for multiple years, but she feels was misdiagnosed. Has not taken adderall for the last two years. She has had depression since her 20's. Has had multiple bouts of severe depression over the course of her life. Most recent severe episode was a few years ago. It was quite prolonged as she describes it spanning the last few years of her marriage. She would function okay at work but then would spend the rest of her time in bed. She would self isolate, spend lots of time crying, severe anhedonia. Ex  could not deal with her mental health issues and would often ignore her completely. She reports that he even influenced her kids to start ignoring her as well. She has since  from Ex  David. They have been  for around 2 years but are still formalizing the divorce paperwork. Is on good terms with him and there are no custody issues with children.    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 She now works from home since covid. She grew up in [place]. Was raised by mother and step father. Did not have a relationship with biologic father. Mother passed away around 1 year ago from colon  cancer. Step father passed away from a heart attack around 5 years ago. Step father had also suffered from two severe traumatic brain injuries in his last few years of life. Her mother had to serve as his caretaker while undergoing cancer treatment. This was a stressful time for entire family.     She graduated from [employer], attended 2 years of college but did not complete degree. Has played guitar since childhood. 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 Going to concerts and traveling to see favorite artists used to be a large part of her life. She has not had desire to play guitar or go to music shows in months-years. This is something she would eventually like to get back to as it brought her lots of stew. She was also in a punk band in the 90s.      Her oldest child Neo is nonbinary, 21 years old and they live with their dad. They come to visit every Tuesday evening. Janice and Neo eat dinner together and watch crime shows together. Younger son oRwdy is 15 years old and spends every other week with Janice. They also get along well. They enjoy lots of activities together including disc golf, [place] festivals, video games, anime, music.  Another strong support person for Janice is her partner Chase. They have been dating for around 2 years. She describes him as 'Mr. Positive,' while she is the 'worry wart.' They enjoy playing dungeons and dragons together, trivia nights, and going to [employer].    Anxiety has been more distressing problem for Janice more recently. Has frequent worries and stressors throughout the day. She often has trouble sleeping at night. Has racing thoughts and thinks through lots of negative scenarios. Often worries a lot about her oldest child Neo. At times she can get overwhelmed with anxiety and feels she 'shuts down.' Does not feel they are full blown panic attacks as she does not experience shortness of breath or rapid heart rate, but these episodes are  extremely distressing. She will feel completely overwhelmed and not remember what is going on.gives an example where something small went wrong before attending a festival, she then shut down and was not able to attend the event. Estimates she has had a panic episode 4-5 times in the last 6 months.     Their child Neo has depression, anxiety, and is gender fluid with some dysphoria at times. Janice is often worried about Neo and their decisions as she feels they are naive and easily influenced by others. Neo was recently extorted for hundreds of dollars by someone they met online. They also had to switch psych providers recentlty and they had stopped medications abruptly without telling Janice. They also have met a new friend and are curious about being polyamorous and Janice worries about safe sex practices. Janice also does not like her current living situation at large apartment complex. She plans to buy a home once she has finalized her divorce.     Janice has no history of suicide attempts or SIB. No psychiatric hospitalizations. Not many formal mental health diagnoses in her family but reports depression and anxiety likely in multiple family members. Has one cousin with bipolar disorder. Her nephew completed suicide last year by jumping off [employer]. He was 16 years old. The father, her brother, has struggled with alcohol use disorder since this event.An uncle completed suicide by shooting himself many years ago.       Janice has no history of seizures. She may have had mild concussion in 2013. No legal issues.       PSYCHIATRIC AND DIAGNOSTIC INTERVIEW     Depression: difficulty with sleep, low energy, feeling bad about self      Sleep: racing thoughts at night    Eating Disorder: no previous diagnoses, has poor relationship with food in general    History of Depression (prior episodes): Patient recalls first feeling depressed in her 20s. See HPI for more details.    Mini/hypomania: no hx of mini or  hypomanic symptoms    Panic:   Symptoms include: Patient reports 'shutting down,' derealization, fear of losing control, inability to complete tasks or go to events during these times     Patient denied worries about future attacks, consequences of attacks, and did not report any changes to behavior due to attacks.    Frequency: 4-5 over the last 6 months      Agoraphobia: none    Social anxiety: none reported    Obsessions:  none reported, denies intrusive or obsessive thoughts.   Compulsions: None reported, never driven to do something repeatedly in response to an obsession or a rigid rule     Trauma history: none, denies sexual, physical, emotional abuse.     PTSD:  none    Generalized Anxiety: Excessive worry about several routine things, anxieties and worries present most days, difficulty controlling worries. During times of anxiety, pt endorsed feeling restless, mind going blank, insomnia.    Psychosis: No history of auditory or visual hallucinations.     Substance use history:      Tobacco:  smokes cigarettes  First Regular Use: age 19  Pattern of Use: 1 pack per day, around last 30 years  Date of Last Use: today    Alcohol:  Drinks 6-8 beers per week on average.     Cannabis:   Uses cannabis vape pen multiple times per week. Reports it helps with hip pain.     Other Illicit Drugs:  denies history of use all other illicit drugs. No history of CD treatment.        SAFETY ASSESSMENT     Suicide:  Level of immediate risk: Low  Ideation/Plan/Intent: none  Deterrents: family, friends, history of seeking help    Self-injurious Behaviors [method, most recent]: none  Suicide Attempt : none    Homicide/Violence:  Assessed level of immediate risk: None  Denies ideation, plan, and intent.    PSYCHIATRIC AND SUBSTANCE USE TREATMENT HISTORY     Current psychiatric medications: Effexor, buspar  Current major medical issues: see notes by medical providers.    Psychiatric treatment history:   Psych Inpatient Hospitalizations  [#, most recent]: none  ECT [#, most recent]: none  Outpatient Programs [DBT, Day Treatment, Eating Disorder Tx, etc, IOP]: none  Individual Therapy: saw individual therapist for a period of time in her late 20s or early 30s. Thinks it was supportive psychotherapy.    Substance use treatment history : none    SOCIAL AND FAMILY HISTORY     Upbringing: Cheyenne Schulte was born and raised in [place]- together with mother, step dad and has one brother.   Head injuries: none  Life Events/Stressors: see HPI, recent death of family members, separation from ex   Academic (e.g., problems in school, learning difficulties, highest education): none reported    Current Living Situation/Family Relationships: Lives in apartment in [place]  Leisure time and interests: trivia, music, shows, spending time with sons and partner, dungeons and dragons  Exercise:  walks the dog  Children: Rowdy age 15, Neo age 21  Marital status:  from Ex , currently in relationship with [facility]  Occupation/ Financial Support: works full time  Cultural/ Social/ Spiritual/ Scientology Support: not addressed today  Legal History: none    Family psychiatric history:     Not many formal mental health diagnoses in her family but reports depression and anxiety likely in multiple family members. Son has depression and anxiety. Has one cousin with bipolar disorder.     Nephew completed suicide last year by jumping off [employer]. He was 16 years old. The father, her brother, has struggled with alcohol use disorder since this event.    An uncle completed suicide by shooting himself many years ago.     MENTAL STATUS EXAM                                                                                       Alertness: alert  and oriented  Appearance: adequately groomed  Behavior/Demeanor: cooperative, pleasant, and calm, with good  eye contact   Speech: normal Intact. Normal volume, tory. No prosody.  Language: intact. Preferred language  identified as English.  Psychomotor: normal or unremarkable  Mood: anxious  Affect: full range; was congruent to mood; was congruent to content  Thought Process/Associations: unremarkable  Thought Content:  Reports none;  Denies suicidal ideation and violent ideation  Perception:  Reports none;  Denies auditory hallucinations and visual hallucinations; intact   Insight: good  Judgment: good  Cognition: (6) does  appear grossly intact; formal cognitive testing was not done  Gait and Station:  not observed, virtual visit    ASSESSMENT DATA                                                                                          5/26/2022     9:10 AM 8/16/2024     9:44 AM   PHQ   PHQ-9 Total Score 3 4   Q9: Thoughts of better off dead/self-harm past 2 weeks Not at all Not at all            5/26/2022     9:10 AM   SERGIO-7 SCORE   Total Score 4        ASSESSMENT SUMMARY   Cheyenne Schulte is a 54 year old  White Not  or  female with psychiatric history of MDD and SERGIO who presented for a comprehensive assessment of psychiatric symptoms in the Department of Psychiatry.  Janice  presented today as a an excellent historian who is able to talk through full history of psych symptoms and previous treatments.  Janice has Good insight in their current circumstances. Diagnosis of MDD in partial remission and generalized anxiety seems supported by patient report and collateral records.  Further diagnostic clarification is not needed at this time.  There are no medical comorbidities which impact this treatment.    Psychosocial factors impacting treatment include Parent/Child Relationship Difficulties, Phase of Life Difficulties, and need for hip replacement . Psychosocial stressors were identified as health issues, mental health symptoms, parent-child stress, and relationship stress. Janice  has evidence of functional impairment including difficulty with home-family and self-care routines. More specifically, has  struggled with increased anxiety in response to daily life stressors, recent grief and loss over the last few years.  Goal is to increase their functioning detailed above and assist Janice to make progress towards their goals.  Janice identified the following strengths that will help them succeed in recovery:  has family support, is medically insured, has a stable living environment, has mental health providers in place, is future oriented, and is gainfully employed.  Things that may interfere with their success include  tendency to procrastinate due to mental health symptoms .    Janice agrees to treatment with the capacity to do so. Agrees to call clinic for any problems. The patient understands to call 911 or come to the nearest ED if life threatening or urgent symptoms present.    Plan     - Weekly supportive therapy / behavioral activation therapy with writer.  - RTC: 1 week        Name of provider: RACHEAL REESE MD  Name of supervisor (if learner): Jourdan Marquis and Dr. Torres  Patient care discussed in supervision with above supervisor, who will review and sign note.

## 2024-10-10 ENCOUNTER — VIRTUAL VISIT (OUTPATIENT)
Dept: PSYCHIATRY | Facility: CLINIC | Age: 54
End: 2024-10-10
Attending: PSYCHOLOGIST
Payer: COMMERCIAL

## 2024-10-10 DIAGNOSIS — F33.1 MDD (MAJOR DEPRESSIVE DISORDER), RECURRENT EPISODE, MODERATE (H): Primary | ICD-10-CM

## 2024-10-10 DIAGNOSIS — F41.1 GAD (GENERALIZED ANXIETY DISORDER): ICD-10-CM

## 2024-10-10 PROCEDURE — 90837 PSYTX W PT 60 MINUTES: CPT | Mod: 95

## 2024-10-10 NOTE — PROGRESS NOTES
[facility]    OUTPATIENT PSYCHOTHERAPY PROGRESS NOTE    Patient: Cheyenne Schulte (1970), MRN: [id number]    Diagnosis:   1. MDD (major depressive disorder), recurrent, in partial remission    2. SERGIO (generalized anxiety disorder)        Provider: RACHEAL REESE MD  Date of Service:  10/10/24    Service: 20078 - Psychotherapy (with patient) - 53+ minutes  Start Time: 4:00  End Time: 5:02  In- Person at the [facility]: No    Extended Session (60+ minutes): No  Interactive Complexity: No  Crisis: No    Video Visit Details:   Telemedicine Visit: The patient's condition can be safely assessed and treated via synchronous audio and visual telemedicine encounter.  Reason for Video Visit: Patient has requested telehealth visit  Originating location (patient location):  [facility]   Location- Patient's home  Distant Site (provider location): HIPAA compliant location - Provider Remote Setting- Home Office  Platform used for video visit:  Secure real time interactive audio and visual telecommunication system via Qwickly    Individuals Present:   Client attended alone    Medication utilization: No current psychiatric medications prescribed , managed by different provider  .     Subjective:  Today Janice provided update on health issues. She is set to get steroid injection in hip which will help her to be more active and prepare for upcoming surgery. Depression has been stable but still experiences some sadness and mood fluctuation. Anxiety mostly around health issues and worries about children. School performance for youngest child is a stressor. Has been social and going out with partner. Went to concert last week and started practicing guitar again. Reflects on negative self talk and ways her family dynamic has been stressful in past and present. Feels she has always had to be the more active parent with discipline and setting  boundaries. She looks forward to going to [place] for [employer] this weekend.     Treatment interventions:      Psychotherapy Interventions - Supportive : Active listening, Encouragement, praise, ego support, Identification of strengths, and Validation    Psychotherapy Interventions - Motivational Interviewing : Assess stage of change         Mental Status Exam    General: alert  adequately groomed  Behavior/Demeanor: cooperative and pleasant, with good  eye contact   Speech: regular rate and rhythm Intact. Normal volume, tory. No prosody.  Psychomotor: normal or unremarkable  Mood:  okay  Affect: full range; was congruent to mood; was congruent to content  Thought Process/Associations: unremarkable  Thought Content:  no evidence of suicidal or violent ideation, no evidence of psychotic thought   Insight: good  Judgment: good    Assessment and Progress:     Behavioral Observations: Janice continues to have anxiety related to health and her children. Has been utilizing support from friends and partner. Has been functioning well at work.  Assessment: Janice is making good progress toward broader treatment goals and is optimistic about facing current challenges. Has been reflecting on triggers for anxiety and avoiding behaviors that worsened mood in the past. No current safety concerns.    Plan:     Next therapy appointment has been scheduled for next week to continue work on treatment goals.    For homework, patient agreed to start brainstorming topics and areas of focus for treatment plan        Name of provider: RACHEAL REESE MD MPH  Name of supervisor (if learner): Jourdan Marquis and Dr. Torres  Patient care discussed in supervision with above supervisor, who will review and sign note.

## 2024-10-15 ENCOUNTER — ALLIED HEALTH/NURSE VISIT (OUTPATIENT)
Dept: FAMILY MEDICINE | Facility: CLINIC | Age: 54
End: 2024-10-15
Payer: COMMERCIAL

## 2024-10-15 DIAGNOSIS — Z30.42 ENCOUNTER FOR MANAGEMENT AND INJECTION OF DEPO-PROVERA: Primary | ICD-10-CM

## 2024-10-15 LAB — HCG UR QL: NEGATIVE

## 2024-10-15 PROCEDURE — 81025 URINE PREGNANCY TEST: CPT

## 2024-10-15 PROCEDURE — 99207 PR NO CHARGE NURSE ONLY: CPT

## 2024-10-15 NOTE — PROGRESS NOTES
Clinic Administered Medication Documentation      Depo Provera Documentation    Depo-Provera Standing Order inclusion/exclusion criteria reviewed.     Is this the initial or subsequent dose of Depo Provera? Subsequent dose - patient is not within the acceptable window of time (11-15 weeks) for subsequent injection. Pregnancy test is indicated. Pregnancy test result: negative       Patient meets: inclusion criteria     Is there an active order (written within the past 365 days, with administrations remaining, not ) in the chart? Yes.     Prior to injection, verified patient identity using patient's name and date of birth. Medication was administered. Please see MAR and medication order for additional information.     Vial/Syringe: Single dose vial. Was entire vial of medication used? Yes    Patient instructed to remain in clinic for 15 minutes and report any adverse reaction to staff immediately.  NEXT INJECTION DUE: 24 - 25    Verified that the patient has refills remaining in their prescription.

## 2024-10-16 ENCOUNTER — TELEPHONE (OUTPATIENT)
Dept: GASTROENTEROLOGY | Facility: CLINIC | Age: 54
End: 2024-10-16
Payer: COMMERCIAL

## 2024-10-16 RX ORDER — ONDANSETRON 4 MG/1
TABLET, FILM COATED ORAL
Qty: 6 TABLET | Refills: 0 | Status: SHIPPED | OUTPATIENT
Start: 2024-10-16 | End: 2024-11-07

## 2024-10-16 NOTE — TELEPHONE ENCOUNTER
Caller: call to Cheyenne Schulte      Reason for Reschedule/Cancellation   (please be detailed, any staff messages or encounters to note?): provider now unavailable in AM on 11/6      Prior to reschedule please review:  Ordering Provider: VERONICA BACA  Sedation Determined: MAC  Does patient have any ASC Exclusions, please identify?: NO      Notes on Cancelled Procedure:  Procedure: Lower Endoscopy [Colonoscopy]   Date: 11/6  Location: [facility]; [address] [place] [address] [address], [place] [address]  Surgeon: DONI      Rescheduled: No, ESCALATED TO IJE & HIGH PRIORITY STAFF MSSG TO PCP, DR BACA, PATIENT REQUESTING EXTERNAL ORDER, SHE IS OVERDUE FOR HER SCREENING COLONOSCOPY & HAS HX OF POLYPS       Did you cancel or rescheduled an EUS procedure? No.

## 2024-10-16 NOTE — CONFIDENTIAL NOTE
"Out bound call to patient Janice Schulte.  Patient expressed frustration that her procedure had been \"bumped.\"  It was explained to her that she was not bumped and the provider was no longer available that day to complete procedures.  Patient expressed concern that given her family history of colon cancer additional delays were unacceptable.  Patient was audibly in distress and upset by the idea of having to reschedule.  She was offered a slot on Thursday, October 24 at the [facility] and declined citing a work conflict.  Patient was offered an overbook slot at the [facility] on Monday, October 28 and accepted.      Procedure Scheduled: Lower Endoscopy [Colonoscopy]  Procedure Date: Monday, October 28, 2024  Site:[facility]; [address] [place] [address], [address] Floor, [place] [address]  Endoscopist: Dr. Raheem Yan  Sedation: MAC ONLY (Required)  Ordering Provider: Dr. Jh Avalos                     "

## 2024-10-16 NOTE — TELEPHONE ENCOUNTER
Rescheduled Colonoscopy   Due to needing MAC sedation    Pre visit planning completed.      Procedure details:    Patient scheduled for Colonoscopy on 10/28/24.     Arrival time: 1115. Procedure time 1215    Facility location: [facility]; [address] [place] [address], [address] Floor, [place] [address]. Check in location: 5th Floor.    Sedation type: MAC    Pre op exam needed? No.    Indication for procedure: Screening      Chart review:     Electronic implanted devices? No    Recent diagnosis of diverticulitis within the last 6 weeks? No      Medication review:    Diabetic? No    Anticoagulants? No    Weight loss medication/injectable? No GLP-1 medication per patient's medication list.  RN will verify with pre-assessment call.    Other medication HOLDING recommendations:  N/A      Prep for procedure:     Bowel prep recommendation: Extended Golytely. Verify if patient picked up from previously scheduled procedure?    Zofran sent in as well per patient request - see documentation below    Bowel prep prescription sent to   [id number] - [address]   Due to: BMI > 40.     Prep instructions sent via Mippinaquiles Morton RN  Endoscopy Procedure Pre Assessment RN  [phone] option 2

## 2024-10-17 ENCOUNTER — MYC MEDICAL ADVICE (OUTPATIENT)
Dept: FAMILY MEDICINE | Facility: CLINIC | Age: 54
End: 2024-10-17
Payer: COMMERCIAL

## 2024-10-17 ENCOUNTER — VIRTUAL VISIT (OUTPATIENT)
Dept: PSYCHIATRY | Facility: CLINIC | Age: 54
End: 2024-10-17
Attending: PSYCHOLOGIST
Payer: COMMERCIAL

## 2024-10-17 DIAGNOSIS — Z12.11 SCREENING FOR COLON CANCER: Primary | ICD-10-CM

## 2024-10-17 DIAGNOSIS — F41.1 GAD (GENERALIZED ANXIETY DISORDER): ICD-10-CM

## 2024-10-17 DIAGNOSIS — F33.1 MDD (MAJOR DEPRESSIVE DISORDER), RECURRENT EPISODE, MODERATE (H): Primary | ICD-10-CM

## 2024-10-17 PROCEDURE — 90837 PSYTX W PT 60 MINUTES: CPT | Mod: 95

## 2024-10-17 NOTE — PROGRESS NOTES
[facility]    OUTPATIENT PSYCHOTHERAPY PROGRESS NOTE    Patient: Cheyenne Schulte (1970), MRN: [id number]    Diagnosis:   1. MDD (major depressive disorder), recurrent, in partial remission    2. SERGIO (generalized anxiety disorder)       Provider: RACHEAL REESE MD  Date of Service:  10/17/24    Service: 63554 - Psychotherapy (with patient) - 53+ minutes  Start Time: 4:02  End Time: 5:03  In- Person at the [facility]: No    Extended Session (60+ minutes): No  Interactive Complexity: No  Crisis: No    Video Visit Details:   Telemedicine Visit: The patient's condition can be safely assessed and treated via synchronous audio and visual telemedicine encounter.  Reason for Video Visit: Patient has requested telehealth visit  Originating location (patient location):  [facility]   Location- Patient's home  Distant Site (provider location): HIPAA compliant location - Provider Remote Setting- Home Office  Platform used for video visit:  Secure real time interactive audio and visual telecommunication system via [employer]    Individuals Present:   Client attended alone    Medication utilization: No changes to current psychiatric medication(s) ; managed by different provider      Subjective:  Today Janice provided update on ongoing health concerns and frustrations with scheduling her yearly colonoscopy. Checked in regarding depression and anxiety symptoms. Went on a trip to [place] with partner last weekend which went well. Continuing to work on strategies to reach goal of hip surgery in the next few months. Explored how her low self esteem impacts interpersonal relationships. Reviewed potential triggers and patterns with emotional dysregulation. Would like to focus on self esteem and anxiety as major parts of treatment plan. Plan made to continue working on self esteem building activities such as playing guitar and to journal about feelings and  objective situation when anxiety is high.      Treatment interventions:    Psychotherapy Interventions - Supportive : Active listening, Encouragement, praise, ego support, Rapport building, and Validation    Psychotherapy Interventions - Motivational Interviewing : Promote hope and positive expectations        Psychotherapy Interventions - Psychodynamic : Encourage nuanced understanding of interpersonal relationships    Mental Status Exam    General: alert  and oriented adequately groomed  Behavior/Demeanor: cooperative and pleasant, with good  eye contact   Speech: regular rate and rhythm Intact. Normal volume, tory. No prosody.  Psychomotor: normal or unremarkable  Mood:  overwhelmed  Affect: tearful at times; intensity heightened, congruent to mood and congruent to content  Thought Process/Associations: unremarkable  Thought Content:  Reports none;  no evidence of suicidal or violent ideation  Insight: good  Judgment: good    Assessment and Progress:     Behavioral Observations: Janice has had difficulty navigating health system, ongoing frustrations with barriers to getting colonoscopy and hip replacement. Has challenges with poor self esteem which impact her sense of self and her relationships with loved ones. Emotional regulation component difficult with higher anxiety levels.  Assessment: Janice is making adequate progress towards treatment goals. No current safety concerns.      Plan:     Next therapy appointment has been scheduled for next week to continue work on treatment goals.    For homework, patient agreed to reflect on patterns that lead to sense of overwhelm and to engage in more music related activities as it helps with self esteem building.     Treatment Plan          SYMPTOMS; PROBLEMS   MEASURABLE GOALS;     INTERVENTIONS;   GAINS MADE DISCHARGE CRITERIA   Emotional regulation and anxiety   -journal/document triggers for anxiety  -reflect on patterns which lead to overwhelm and/or  panic  -develop strategies for thought distraction when ruminating homework assignments  self-care skills marked symptom improvement   Self esteem, interpersonal relationships   report feeling more positive about self   -accept guidance and help from others  -work on doing 1 music related activity per week   building on strengths  psychotherapy marked symptom improvement   Health journey and self care -continue to follow up with GI for colonoscopy    -goal of losing 10 lbs for hip replacement surgery -appointment made with PCP to help with weight loss Improved functional status     Date of most recent treatment plan: 10/17/24  Date next treatment plan due: 3 months    Patient agreed with the above treatment plan on 10/17/24. Discussed case with supervisor who also agreed with the treatment plan. Unable to sign in person due to visit being conducted through telehealth.     Name of provider: RACHEAL REESE MD  Name of supervisor (if learner): Jourdan Marquis and Dr. Torres  Patient care discussed in supervision with above supervisor, who will review and sign note.

## 2024-10-17 NOTE — TELEPHONE ENCOUNTER
Pre assessment completed for upcoming procedure.      Procedure details:    Patient scheduled for Colonoscopy on 10/28/24.     Arrival time: 1115. Procedure time 1215     Facility location: [facility]; [address] [place] [address], [address] Floor, [place] [address]. Check in location: 5th Floor.     Sedation type: MAC     Pre op exam needed? No.    Designated  policy reviewed. Instructed to have someone stay 24 hours post procedure.       Medication review:    Weight loss medication/injectable? No.      Prep for procedure:     Reviewed procedure prep instructions.     Patient verbalized understanding and had no questions or concerns at this time.        Mari Morton RN  Endoscopy Procedure Pre Assessment   [phone] option 2

## 2024-10-18 NOTE — TELEPHONE ENCOUNTER
[employer] message sent to patient.     Thanks,  JAMAR Morton  [facility]

## 2024-10-24 ENCOUNTER — VIRTUAL VISIT (OUTPATIENT)
Dept: PSYCHIATRY | Facility: CLINIC | Age: 54
End: 2024-10-24
Attending: PSYCHOLOGIST
Payer: COMMERCIAL

## 2024-10-24 DIAGNOSIS — F41.1 GAD (GENERALIZED ANXIETY DISORDER): ICD-10-CM

## 2024-10-24 DIAGNOSIS — F33.1 MDD (MAJOR DEPRESSIVE DISORDER), RECURRENT EPISODE, MODERATE (H): Primary | ICD-10-CM

## 2024-10-24 PROCEDURE — 90837 PSYTX W PT 60 MINUTES: CPT | Mod: 95

## 2024-10-24 NOTE — PROGRESS NOTES
[facility]    OUTPATIENT PSYCHOTHERAPY PROGRESS NOTE    Patient: Cheyenne Schulte (1970), MRN: [id number]    Diagnosis:   1. MDD (major depressive disorder), recurrent, in partial remission    2. SERGIO (generalized anxiety disorder)       Provider: RACHEAL ERESE MD  Date of Service:  10/24/24    Service: 20635 - Psychotherapy (with patient) - 53+ minutes  Start Time: 4:01  End Time: 5:04  In- Person at the [facility]: No    Extended Session (60+ minutes): No  Interactive Complexity: No  Crisis: No    Video Visit Details:   Telemedicine Visit: The patient's condition can be safely assessed and treated via synchronous audio and visual telemedicine encounter.  Reason for Video Visit: Patient has requested telehealth visit  Originating location (patient location):  [facility]   Location- Patient's home  Distant Site (provider location): HIPAA compliant location - Provider Remote Setting- Home Office  Platform used for video visit:  Secure real time interactive audio and visual telecommunication system via [employer]    Individuals Present:   Client attended alone    Medication utilization: No changes to current psychiatric medication(s) ; managed by different provider      Subjective:  Today Janice provided update on ongoing health concerns and frustrations with scheduling her yearly colonoscopy. It is now set for Monday. She is coming down with a cold, so trying to rest up in order to make appt. Checked in regarding depression and anxiety symptoms. Had a hard day on Monday after wait was too long at PCP office and she had to leave before being seen. Appt rescheduled for virtual. Continuing to work on strategies to reach goal of hip surgery in the next few months.  Reviewed potential triggers and patterns with emotional dysregulation. Talked through links of thoughts, behaviors, feelings. Also reviewed TIPP skills. Plan made to  continue working on self esteem building activities such as playing guitar and to journal about feelings and objective situation when anxiety is high.      Treatment interventions:    Psychotherapy Interventions - Supportive : Active listening, Encouragement, praise, ego support, Identification of strengths, and Goal-setting    Psychotherapy Interventions - Motivational Interviewing : Promote hope and positive expectations    Coached on coping techniques/relaxation skills to help improve distress tolerance and managing intense emotions. and Taught the link between thoughts, feelings, and behaviors.    Psychotherapy Interventions - Psychodynamic : Explore emotions, especially contradicting/confusing ones    Mental Status Exam    General: alert  and oriented adequately groomed  Behavior/Demeanor: cooperative and pleasant, with good  eye contact   Speech: regular rate and rhythm Intact. Normal volume, tory. No prosody.  Psychomotor: normal or unremarkable  Mood:  overwhelmed  Affect: tearful at times; intensity heightened, congruent to mood and congruent to content  Thought Process/Associations: unremarkable  Thought Content:  Reports none;  no evidence of suicidal or violent ideation  Insight: good  Judgment: good    Assessment and Progress:     Behavioral Observations: Janice has had difficulty navigating health system, ongoing frustrations and health anxiety related to colonoscopy scheduling and planning for hip replacement. Has challenges with poor self esteem which impact her sense of self and her relationships with loved ones. Emotional regulation component difficult with higher anxiety levels and disappointments related to health care.  Assessment: Janice is making adequate progress towards treatment goals. No current safety concerns.      Plan:     Next therapy appointment has been scheduled for next week to continue work on treatment goals.    For homework, patient agreed to reflect on patterns that lead to  sense of overwhelm and to engage in more music related activities as it helps with self esteem building. She will also try to implement TIPP skills for anxiety.    Treatment Plan          SYMPTOMS; PROBLEMS   MEASURABLE GOALS;     INTERVENTIONS;   GAINS MADE DISCHARGE CRITERIA   Emotional regulation and anxiety   1.Develop skills to manage depression and anxiety:  A) Journal/document triggers for anxiety  -B). Reflect on patterns which lead to overwhelm and/or panic (BCA)  C) develop strategies for thought distraction when ruminating  D)Try TIPP skills homework assignments  self-care skills     marked symptom improvement   Self esteem, interpersonal relationships   2. Increase self-validation and self esteem  A)Report feeling more positive about self   B) accept guidance and help from others  C) work on doing 1 music related activity per week   building on strengths  psychotherapy marked symptom improvement      Health journey and self care 3.  Increase self care   A) Continue to follow up with GI for colonoscopy    B) goal of losing 10 lbs for hip replacement surgery -appointment made with PCP to help with weight loss Improved functional status        Date of most recent treatment plan: 10/17/24  Date next treatment plan due: 3 months    Patient agreed with the above treatment plan on 10/17/24. Discussed case with supervisor who also agreed with the treatment plan. Unable to sign in person due to visit being conducted through telehealth.     Name of provider: RACHEAL REESE MD  Name of supervisor (if learner): Jourdan Marquis and Dr. Torres  Patient care discussed in supervision with above supervisor, who will review and sign note.

## 2024-10-25 ENCOUNTER — VIRTUAL VISIT (OUTPATIENT)
Dept: FAMILY MEDICINE | Facility: CLINIC | Age: 54
End: 2024-10-25
Payer: COMMERCIAL

## 2024-10-25 DIAGNOSIS — E66.01 OBESITY, CLASS III, BMI 40-49.9 (MORBID OBESITY) (H): ICD-10-CM

## 2024-10-25 DIAGNOSIS — Z83.3 FAMILY HISTORY OF DIABETES MELLITUS: ICD-10-CM

## 2024-10-25 DIAGNOSIS — Z71.3 WEIGHT LOSS COUNSELING, ENCOUNTER FOR: Primary | ICD-10-CM

## 2024-10-25 DIAGNOSIS — O24.414 INSULIN CONTROLLED GESTATIONAL DIABETES MELLITUS (GDM) DURING PREGNANCY, ANTEPARTUM: ICD-10-CM

## 2024-10-25 PROCEDURE — G2211 COMPLEX E/M VISIT ADD ON: HCPCS | Mod: 95 | Performed by: FAMILY MEDICINE

## 2024-10-25 PROCEDURE — 99214 OFFICE O/P EST MOD 30 MIN: CPT | Mod: 95 | Performed by: FAMILY MEDICINE

## 2024-10-25 RX ORDER — TIRZEPATIDE 2.5 MG/.5ML
2.5 INJECTION, SOLUTION SUBCUTANEOUS
Qty: 2 ML | Refills: 1 | Status: SHIPPED | OUTPATIENT
Start: 2024-10-25 | End: 2024-11-07

## 2024-10-25 NOTE — PROGRESS NOTES
Carlos is a 54 year old who is being evaluated via a billable video visit.    How would you like to obtain your AVS? MyChart  If the video visit is dropped, the invitation should be resent by: Text to cell phone: [phone]  Will anyone else be joining your video visit? No      Assessment & Plan       ICD-10-CM    1. Weight loss counseling, encounter for  Z71.3 MOUNJARO 2.5 MG/0.5ML SOAJ      2. Insulin controlled gestational diabetes mellitus (GDM) during pregnancy, antepartum  O24.414 MOUNJARO 2.5 MG/0.5ML SOAJ      3. Family history of diabetes mellitus  Z83.3 MOUNJARO 2.5 MG/0.5ML SOAJ      4. Obesity, Class III, BMI 40-49.9 (morbid obesity) (H)  E66.01 MOUNJARO 2.5 MG/0.5ML SOAJ                The longitudinal plan of care for the diagnosis(es)/condition(s) as documented were addressed during this visit. Due to the added complexity in care, I will continue to support Janice in the subsequent management and with ongoing continuity of care.     There are no Patient Instructions on file for this visit.    Angel Camacho is a 54 year old, presenting for the following health issues:  Weight Loss      10/25/2024    10:15 AM   Additional Questions   Roomed by Erica   Accompanied by none         10/25/2024    10:15 AM   Patient Reported Additional Medications   Patient reports taking the following new medications none     History of Present Illness       Reason for visit:  Weight loss medication. I have to lose weight before hip surgery in February.   She is taking medications regularly.     Wt Readings from Last 4 Encounters:   06/25/24 106.4 kg (234 lb 9.6 oz)   06/06/24 105.2 kg (232 lb)   12/26/23 107 kg (236 lb)   10/21/22 98.4 kg (217 lb)               Review of Systems  Constitutional, HEENT, cardiovascular, pulmonary, gi and gu systems are negative, except as otherwise noted.      Objective           Vitals:  No vitals were obtained today due to virtual visit.  There is no height or weight on file to  calculate BMI.    Physical Exam   GENERAL: alert and no distress  EYES: Eyes grossly normal to inspection.  No discharge or erythema, or obvious scleral/conjunctival abnormalities.  RESP: No audible wheeze, cough, or visible cyanosis.    SKIN: Visible skin clear. No significant rash, abnormal pigmentation or lesions.  NEURO: Cranial nerves grossly intact.  Mentation and speech appropriate for age.  PSYCH: Appropriate affect, tone, and pace of words          Video-Visit Details    Type of service:  Video Visit   Originating Location (pt. Location): Home    Distant Location (provider location):  On-site  Platform used for Video Visit: Adam  Signed Electronically by: Jh Avalos MD

## 2024-10-28 ENCOUNTER — ANESTHESIA (OUTPATIENT)
Dept: SURGERY | Facility: AMBULATORY SURGERY CENTER | Age: 54
End: 2024-10-28
Payer: COMMERCIAL

## 2024-10-28 ENCOUNTER — ANESTHESIA EVENT (OUTPATIENT)
Dept: SURGERY | Facility: AMBULATORY SURGERY CENTER | Age: 54
End: 2024-10-28
Payer: COMMERCIAL

## 2024-10-28 ENCOUNTER — TELEPHONE (OUTPATIENT)
Dept: FAMILY MEDICINE | Facility: CLINIC | Age: 54
End: 2024-10-28
Payer: COMMERCIAL

## 2024-10-28 ENCOUNTER — HOSPITAL ENCOUNTER (OUTPATIENT)
Facility: AMBULATORY SURGERY CENTER | Age: 54
Discharge: HOME | End: 2024-10-28
Attending: INTERNAL MEDICINE | Admitting: INTERNAL MEDICINE
Payer: COMMERCIAL

## 2024-10-28 VITALS
HEART RATE: 77 BPM | BODY MASS INDEX: 40.29 KG/M2 | RESPIRATION RATE: 20 BRPM | WEIGHT: 236 LBS | DIASTOLIC BLOOD PRESSURE: 66 MMHG | HEIGHT: 64 IN | TEMPERATURE: 97.3 F | OXYGEN SATURATION: 100 % | SYSTOLIC BLOOD PRESSURE: 108 MMHG

## 2024-10-28 VITALS — HEART RATE: 82 BPM

## 2024-10-28 DIAGNOSIS — E66.01 OBESITY, CLASS III, BMI 40-49.9 (MORBID OBESITY) (H): Primary | ICD-10-CM

## 2024-10-28 LAB — COLONOSCOPY: NORMAL

## 2024-10-28 PROCEDURE — 88305 TISSUE EXAM BY PATHOLOGIST: CPT | Mod: TC | Performed by: INTERNAL MEDICINE

## 2024-10-28 PROCEDURE — 45385 COLONOSCOPY W/LESION REMOVAL: CPT | Performed by: ANESTHESIOLOGY

## 2024-10-28 PROCEDURE — 88305 TISSUE EXAM BY PATHOLOGIST: CPT | Mod: 26 | Performed by: STUDENT IN AN ORGANIZED HEALTH CARE EDUCATION/TRAINING PROGRAM

## 2024-10-28 PROCEDURE — 45385 COLONOSCOPY W/LESION REMOVAL: CPT | Performed by: NURSE ANESTHETIST, CERTIFIED REGISTERED

## 2024-10-28 PROCEDURE — 45380 COLONOSCOPY AND BIOPSY: CPT | Mod: 59,33 | Performed by: INTERNAL MEDICINE

## 2024-10-28 PROCEDURE — 45385 COLONOSCOPY W/LESION REMOVAL: CPT | Mod: 33 | Performed by: INTERNAL MEDICINE

## 2024-10-28 RX ORDER — LIDOCAINE 40 MG/G
CREAM TOPICAL
Status: DISCONTINUED | OUTPATIENT
Start: 2024-10-28 | End: 2024-10-29 | Stop reason: HOSPADM

## 2024-10-28 RX ORDER — ONDANSETRON 4 MG/1
4 TABLET, ORALLY DISINTEGRATING ORAL EVERY 6 HOURS PRN
Status: CANCELLED | OUTPATIENT
Start: 2024-10-28

## 2024-10-28 RX ORDER — NALOXONE HYDROCHLORIDE 0.4 MG/ML
0.2 INJECTION, SOLUTION INTRAMUSCULAR; INTRAVENOUS; SUBCUTANEOUS
Status: CANCELLED | OUTPATIENT
Start: 2024-10-28

## 2024-10-28 RX ORDER — NALOXONE HYDROCHLORIDE 0.4 MG/ML
0.4 INJECTION, SOLUTION INTRAMUSCULAR; INTRAVENOUS; SUBCUTANEOUS
Status: CANCELLED | OUTPATIENT
Start: 2024-10-28

## 2024-10-28 RX ORDER — PROPOFOL 10 MG/ML
INJECTION, EMULSION INTRAVENOUS PRN
Status: DISCONTINUED | OUTPATIENT
Start: 2024-10-28 | End: 2024-10-28

## 2024-10-28 RX ORDER — SODIUM CHLORIDE, SODIUM LACTATE, POTASSIUM CHLORIDE, CALCIUM CHLORIDE 600; 310; 30; 20 MG/100ML; MG/100ML; MG/100ML; MG/100ML
INJECTION, SOLUTION INTRAVENOUS CONTINUOUS PRN
Status: DISCONTINUED | OUTPATIENT
Start: 2024-10-28 | End: 2024-10-28

## 2024-10-28 RX ORDER — FLUMAZENIL 0.1 MG/ML
0.2 INJECTION, SOLUTION INTRAVENOUS
Status: CANCELLED | OUTPATIENT
Start: 2024-10-28 | End: 2024-10-28

## 2024-10-28 RX ORDER — PROCHLORPERAZINE MALEATE 10 MG
10 TABLET ORAL EVERY 6 HOURS PRN
Status: CANCELLED | OUTPATIENT
Start: 2024-10-28

## 2024-10-28 RX ORDER — LIDOCAINE HYDROCHLORIDE 20 MG/ML
INJECTION, SOLUTION INFILTRATION; PERINEURAL PRN
Status: DISCONTINUED | OUTPATIENT
Start: 2024-10-28 | End: 2024-10-28

## 2024-10-28 RX ORDER — ONDANSETRON 2 MG/ML
4 INJECTION INTRAMUSCULAR; INTRAVENOUS EVERY 6 HOURS PRN
Status: CANCELLED | OUTPATIENT
Start: 2024-10-28

## 2024-10-28 RX ORDER — PROPOFOL 10 MG/ML
INJECTION, EMULSION INTRAVENOUS CONTINUOUS PRN
Status: DISCONTINUED | OUTPATIENT
Start: 2024-10-28 | End: 2024-10-28

## 2024-10-28 RX ORDER — ONDANSETRON 2 MG/ML
4 INJECTION INTRAMUSCULAR; INTRAVENOUS
Status: DISCONTINUED | OUTPATIENT
Start: 2024-10-28 | End: 2024-10-29 | Stop reason: HOSPADM

## 2024-10-28 RX ADMIN — LIDOCAINE HYDROCHLORIDE 100 MG: 20 INJECTION, SOLUTION INFILTRATION; PERINEURAL at 12:21

## 2024-10-28 RX ADMIN — PROPOFOL 150 MCG/KG/MIN: 10 INJECTION, EMULSION INTRAVENOUS at 12:21

## 2024-10-28 RX ADMIN — SODIUM CHLORIDE, SODIUM LACTATE, POTASSIUM CHLORIDE, CALCIUM CHLORIDE: 600; 310; 30; 20 INJECTION, SOLUTION INTRAVENOUS at 12:18

## 2024-10-28 RX ADMIN — PROPOFOL 70 MG: 10 INJECTION, EMULSION INTRAVENOUS at 12:21

## 2024-10-28 NOTE — H&P
Cheyenne Schulte  [id number]  female  54 year old      Reason for procedure/surgery: serrated polyposis syndrome    Patient Active Problem List   Diagnosis    Intermittent asthma    Cervical high risk HPV (human papillomavirus) test positive    Papanicolaou smear of cervix with low grade squamous intraepithelial lesion (LGSIL)       Past Surgical History:    Past Surgical History:   Procedure Laterality Date    ABDOMEN SURGERY      Hernia repair    COLONOSCOPY N/A 07/13/2022    Procedure: COLONOSCOPY, FLEXIBLE, WITH LESION REMOVAL USING SNARE;  Surgeon: Shai Da Silva DO;  Location: [facility] OR    COLONOSCOPY N/A 07/13/2022    Procedure: COLONOSCOPY, WITH POLYPECTOMY AND BIOPSY;  Surgeon: Shai Da Silva DO;  Location: [facility]    COLONOSCOPY N/A 10/28/2022    Procedure: COLONOSCOPY, FLEXIBLE, WITH LESION REMOVAL USING SNARE;  Surgeon: Jennifer Ash DO;  Location: [facility]    COLONOSCOPY N/A 10/28/2022    Procedure: COLONOSCOPY, WITH POLYPECTOMY AND BIOPSY;  Surgeon: Jennifer Ash DO;  Location: [facility]    COLONOSCOPY N/A 06/20/2023    Procedure: COLONOSCOPY, WITH POLYPECTOMY AND BIOPSY;  Surgeon: Jennifer Ash DO;  Location: [facility]    COLONOSCOPY N/A 06/20/2023    Procedure: COLONOSCOPY, FLEXIBLE, WITH LESION REMOVAL USING SNARE;  Surgeon: Jennifer Ash DO;  Location: [facility]    COLONOSCOPY WITH CO2 INSUFFLATION N/A 07/13/2022    Procedure: COLONOSCOPY, WITH CO2 INSUFFLATION;  Surgeon: Shai Da Silva DO;  Location: [facility]    COLONOSCOPY WITH CO2 INSUFFLATION N/A 10/28/2022    Procedure: COLONOSCOPY, WITH CO2 INSUFFLATION;  Surgeon: Jennifer Ash DO;  Location: [facility]    COLONOSCOPY WITH CO2 INSUFFLATION N/A 06/20/2023    Procedure: Colonoscopy with CO2 insufflation;  Surgeon: Jennifer Ash DO;  Location: [facility] OR    DILATION AND CURETTAGE      AB    GYN SURGERY      c section    HERNIA REPAIR      umbilical    ORTHOPEDIC SURGERY      ORIF right tibia       Past Medical History:   Past Medical History:    Diagnosis Date    ADHD (attention deficit hyperactivity disorder)     sees psych     Arthritis 5/26/23    It s probably in my right hip too    Depressive disorder 1990 s    Diabetes (H) 2008    Gestational 2008    Family history of colon cancer     Intermittent asthma     Mild major depression (H)     sees psych        Social History:   Social History     Tobacco Use    Smoking status: Every Day     Current packs/day: 1.00     Average packs/day: 1 pack/day for 30.0 years (30.0 ttl pk-yrs)     Types: Cigarettes    Smokeless tobacco: Never    Tobacco comments:     I ve tried quitting numerous times. Only thing that ever worked was pregnancy.   Substance Use Topics    Alcohol use: Yes     Alcohol/week: 4.0 standard drinks of alcohol     Types: 4 Cans of beer per week     Comment: 4 drinks a week       Family History:   Family History   Problem Relation Age of Onset    Arthritis Mother     Colon Cancer Mother     C.A.D. Father     Diabetes Father     Alcohol/Drug Father     Heart Disease Father     Coronary Artery Disease Father     Substance Abuse Father     Obesity Father        Allergies:   Allergies   Allergen Reactions    Theophylline Cr     Zyban [Bupropion Hydrobromide]     Bupropion Rash       Active Medications:   Current Outpatient Medications   Medication Sig Dispense Refill    albuterol (PROAIR HFA/PROVENTIL HFA/VENTOLIN HFA) 108 (90 Base) MCG/ACT inhaler Inhale 2 puffs into the lungs every 6 hours 18 g 3    bisacodyl (DULCOLAX) 5 MG EC tablet Two days prior to exam take two (2) tablets at 4pm. One day prior to exam take two (2) tablets at 4pm 4 tablet 0    busPIRone HCl (BUSPAR) 30 MG tablet Take 1 tablet (30 mg) by mouth 2 times daily. 60 tablet 1    ondansetron (ZOFRAN) 4 MG tablet Take one tablet every six hours as needed for nausea during colonoscopy bowel prepping 6 tablet 0    polyethylene glycol (GOLYTELY) 236 g suspension Two days before procedure at 5PM fill first container with water. Mix and  "drink an 8 oz glass every 10-15 minutes until HALF of the container is gone. Place the remainder in the refrigerator. One day before procedure at 5PM drink second half of bowel prep. Drink an 8 oz glass every 10-15 minutes until it is gone. Day of procedure 6 hours before arrival time fill the 2nd container with water. Mix and drink an 8 oz glass every 10-15 minutes until HALF of the container is gone. Discard the remaining solution. 8000 mL 0    venlafaxine (EFFEXOR XR) 150 MG 24 hr capsule Take 1 capsule (150 mg) by mouth daily. Take along with a 75mg capsule for a total daily dose of 225mg 30 capsule 1    venlafaxine (EFFEXOR XR) 75 MG 24 hr capsule Take 1 capsule (75 mg) by mouth daily. Take along with with a 150mg capsule for a total daily dose of 225mg 30 capsule 1    MOUNJARO 2.5 MG/0.5ML SOAJ Inject 0.5 mLs (2.5 mg) subcutaneously every 7 days. 2 mL 1    nicotine (NICODERM CQ) 14 MG/24HR 24 hr patch Place 1 patch onto the skin every 24 hours      nicotine (NICODERM CQ) 21 MG/24HR 24 hr patch Place 1 patch onto the skin every 24 hours      nicotine (NICODERM CQ) 7 MG/24HR 24 hr patch Place 1 patch onto the skin every 24 hours      nicotine polacrilex (NICORETTE) 4 MG gum How to take it: When the urge to smoke occurs, chew gum until you feel a tingle, then \"park\" the gum in your cheek until the tingle is gone. Re-chew every few minutes and \"park\" again, chewing one piece for 30 minutes. Do not eat or drink while chewing. Follow this schedule: Weeks 1 to 6: One piece of gum every 1 to 2 hours. Use at least 9 pieces a day, but no more than 24. Weeks 7 to 9: One piece of gum every 2 to 4 hours. Weeks 10 to 12: One piece of gum every 4 to 8 hours.         Systemic Review:   CONSTITUTIONAL: NEGATIVE for fever, chills, change in weight  ENT/MOUTH: NEGATIVE for ear, mouth and throat problems  RESP: NEGATIVE for significant cough or SOB  CV: NEGATIVE for chest pain, palpitations or peripheral edema    Physical " "Examination:   Vital Signs: /86   Pulse 78   Temp 97.8  F (36.6  C) (Temporal)   Resp 18   Ht 1.619 m (5' 3.75\")   Wt 107 kg (236 lb)   SpO2 97%   BMI 40.83 kg/m    GENERAL: healthy, alert and no distress  NECK: no adenopathy, no asymmetry, masses, or scars  RESP: lungs clear to auscultation - no rales, rhonchi or wheezes  CV: regular rate and rhythm, normal S1 S2, no S3 or S4, no murmur, click or rub, no peripheral edema and peripheral pulses strong  ABDOMEN: soft, nontender, no hepatosplenomegaly, no masses and bowel sounds normal  MS: no gross musculoskeletal defects noted, no edema    Plan: Appropriate to proceed as scheduled.      Raheem Yan MD  10/28/2024    PCP:  [facility]   "

## 2024-10-28 NOTE — ANESTHESIA CARE TRANSFER NOTE
Patient: Cheyenne Schulte    Procedure: Procedure(s):  COLONOSCOPY, WITH POLYPECTOMY AND CLIPPING x4       Diagnosis: Special screening for malignant neoplasms of nervous system [Z12.82]  Diagnosis Additional Information: No value filed.    Anesthesia Type:   No value filed.     Note:      Level of Consciousness: awake  Oxygen Supplementation: room air    Independent Airway: airway patency satisfactory and stable        Patient transferred to: Phase II    Handoff Report: Identifed the Patient, Identified the Reponsible Provider, Reviewed the pertinent medical history, Discussed the surgical course, Reviewed Intra-OP anesthesia mangement and issues during anesthesia, Set expectations for post-procedure period and Allowed opportunity for questions and acknowledgement of understanding  Vitals:  Vitals Value Taken Time   BP     Temp     Pulse     Resp     SpO2 99 % 10/28/24 1306   Vitals shown include unfiled device data.    Electronically Signed By: ALTHEA Randle CRNA  October 28, 2024  1:07 PM

## 2024-10-28 NOTE — TELEPHONE ENCOUNTER
Patient notified of provider message as written. Patient verbalized good understanding.     Smiley FAYEN, RN  [facility]

## 2024-10-28 NOTE — ANESTHESIA PREPROCEDURE EVALUATION
Anesthesia Pre-Procedure Evaluation    Patient: Cheyenne Schulte   MRN: [id number] : 1970        Procedure : Procedure(s):  COLONOSCOPY, WITH POLYPECTOMY AND CLIPPING x4          Past Medical History:   Diagnosis Date    ADHD (attention deficit hyperactivity disorder)     sees psych     Arthritis 24    It s probably in my right hip too    Depressive disorder  s    Diabetes (H) 2008    Gestational 2008    Family history of colon cancer     Intermittent asthma     Mild major depression (H)     sees psych       Past Surgical History:   Procedure Laterality Date    ABDOMEN SURGERY      Hernia repair    COLONOSCOPY N/A 2022    Procedure: COLONOSCOPY, FLEXIBLE, WITH LESION REMOVAL USING SNARE;  Surgeon: Shai Da Silva DO;  Location: [facility]    COLONOSCOPY N/A 2022    Procedure: COLONOSCOPY, WITH POLYPECTOMY AND BIOPSY;  Surgeon: Shai Da Silva DO;  Location: [facility]    COLONOSCOPY N/A 10/28/2022    Procedure: COLONOSCOPY, FLEXIBLE, WITH LESION REMOVAL USING SNARE;  Surgeon: Jennifer Ash DO;  Location: [facility]    COLONOSCOPY N/A 10/28/2022    Procedure: COLONOSCOPY, WITH POLYPECTOMY AND BIOPSY;  Surgeon: Jennifer Ash DO;  Location: [facility]    COLONOSCOPY N/A 2023    Procedure: COLONOSCOPY, WITH POLYPECTOMY AND BIOPSY;  Surgeon: Jennifer Ash DO;  Location[facility]    COLONOSCOPY N/A 2023    Procedure: COLONOSCOPY, FLEXIBLE, WITH LESION REMOVAL USING SNARE;  Surgeon: Jennifer Ash DO;  Location[facility]    COLONOSCOPY WITH CO2 INSUFFLATION N/A 2022    Procedure: COLONOSCOPY, WITH CO2 INSUFFLATION;  Surgeon: Shai Da Silva DO;  [facility]    COLONOSCOPY WITH CO2 INSUFFLATION N/A 10/28/2022    Procedure: COLONOSCOPY, WITH CO2 INSUFFLATION;  Surgeon: Jennifer Ash DO;  Location[facility]    COLONOSCOPY WITH CO2 INSUFFLATION N/A 2023    Procedure: Colonoscopy with CO2 insufflation;  Surgeon: Jennifer Ash DO;  Location: [facility]    DILATION AND CURETTAGE    "   AB    GYN SURGERY      c section    HERNIA REPAIR      umbilical    ORTHOPEDIC SURGERY      ORIF right tibia      Allergies   Allergen Reactions    Theophylline Cr     Zyban [Bupropion Hydrobromide]     Bupropion Rash      Social History     Tobacco Use    Smoking status: Every Day     Current packs/day: 1.00     Average packs/day: 1 pack/day for 30.0 years (30.0 ttl pk-yrs)     Types: Cigarettes    Smokeless tobacco: Never    Tobacco comments:     I ve tried quitting numerous times. Only thing that ever worked was pregnancy.   Substance Use Topics    Alcohol use: Yes     Alcohol/week: 4.0 standard drinks of alcohol     Types: 4 Cans of beer per week     Comment: 4 drinks a week      Wt Readings from Last 1 Encounters:   10/28/24 107 kg (236 lb)           Physical Exam    Airway        Mallampati: II   TM distance: > 3 FB   Neck ROM: full   Mouth opening: > 3 cm    Respiratory Devices and Support         Dental       (+) Minor Abnormalities - some fillings, tiny chips      Cardiovascular   cardiovascular exam normal          Pulmonary   pulmonary exam normal                OUTSIDE LABS:  CBC: No results found for: \"WBC\", \"HGB\", \"HCT\", \"PLT\"  BMP:   Lab Results   Component Value Date     06/06/2024     06/07/2022    POTASSIUM 5.0 06/06/2024    POTASSIUM 4.7 06/07/2022    CHLORIDE 105 06/06/2024    CHLORIDE 109 06/07/2022    CO2 23 06/06/2024    CO2 29 06/07/2022    BUN 10.6 06/06/2024    BUN 14 06/07/2022    CR 0.93 06/06/2024    CR 0.82 06/07/2022     (H) 06/06/2024     (H) 06/07/2022     COAGS: No results found for: \"PTT\", \"INR\", \"FIBR\"  POC:   Lab Results   Component Value Date    HCG Negative 10/15/2024     HEPATIC:   Lab Results   Component Value Date    ALBUMIN 4.8 06/06/2024    PROTTOTAL 7.5 06/06/2024    ALT 16 06/06/2024    AST 16 06/06/2024    ALKPHOS 99 06/06/2024    BILITOTAL 0.4 06/06/2024     OTHER:   Lab Results   Component Value Date    A1C 5.6 06/06/2024    DOC 9.7 " "06/06/2024       Anesthesia Plan    ASA Status:  3    NPO Status:  NPO Appropriate    Anesthesia Type: MAC.     - Reason for MAC: immobility needed, straight local not clinically adequate      Maintenance: Balanced.        Consents    Anesthesia Plan(s) and associated risks, benefits, and realistic alternatives discussed. Questions answered and patient/representative(s) expressed understanding.     - Discussed: Risks, Benefits and Alternatives for BOTH SEDATION and the PROCEDURE were discussed     - Discussed with:  Patient      - Extended Intubation/Ventilatory Support Discussed: No.      - Patient is DNR/DNI Status: No     Use of blood products discussed: No .     Postoperative Care    Pain management: Multi-modal analgesia.   PONV prophylaxis: Ondansetron (or other 5HT-3), Dexamethasone or Solumedrol     Comments:               Eliana Morin MD    I have reviewed the pertinent notes and labs in the chart from the past 30 days and (re)examined the patient.  Any updates or changes from those notes are reflected in this note.                       # Severe Obesity: Estimated body mass index is 40.83 kg/m  as calculated from the following:    Height as of this encounter: 1.619 m (5' 3.75\").    Weight as of this encounter: 107 kg (236 lb).       # Asthma: noted on problem list       "

## 2024-10-28 NOTE — TELEPHONE ENCOUNTER
Retail Pharmacy Prior Authorization Team   Phone: [phone]    PRIOR AUTHORIZATION DENIED    Medication: MOUNJARO 2.5 MG/0.5ML SC SOAJ  Insurance Company: [employer] ([facility]) - Phone [phone] Fax [phone]  Denial Date: 10/28/2024  Denial Reason(s): MUST HAVE TYPE 2 DIABETES AND PROVIDE DOCUMENTATION CONFIRMING DX      Appeal Information: IF THE PROVIDER WOULD LIKE TO APPEAL THIS DECISION PLEASE PROVIDE THE PA TEAM WITH A LETTER OF MEDICAL NECESSITY      Patient Notified: NO

## 2024-10-28 NOTE — ANESTHESIA POSTPROCEDURE EVALUATION
Patient: Cheyenne P Franca    Procedure: Procedure(s):  COLONOSCOPY, WITH POLYPECTOMY AND CLIPPING x4       Anesthesia Type:  MAC    Note:  Disposition: Outpatient   Postop Pain Control: Uneventful            Sign Out: Well controlled pain   PONV: No   Neuro/Psych: Uneventful            Sign Out: Acceptable/Baseline neuro status   Airway/Respiratory: Uneventful            Sign Out: Acceptable/Baseline resp. status   CV/Hemodynamics: Uneventful            Sign Out: Acceptable CV status; No obvious hypovolemia; No obvious fluid overload   Other NRE: NONE   DID A NON-ROUTINE EVENT OCCUR? No           Last vitals:  Vitals Value Taken Time   /66 10/28/24 1315   Temp 36.3  C (97.3  F) 10/28/24 1315   Pulse 77 10/28/24 1315   Resp 20 10/28/24 1315   SpO2 100 % 10/28/24 1324   Vitals shown include unfiled device data.    Electronically Signed By: Eliana Morin MD  October 28, 2024  2:38 PM

## 2024-10-28 NOTE — CONFIDENTIAL NOTE
Zepbound prescription ordered.  Please notify patient that I recommend seeing MTM to discuss weight loss medications and coverage.  Referral ordered.    Jh Avalos MD

## 2024-10-31 ENCOUNTER — VIRTUAL VISIT (OUTPATIENT)
Dept: PSYCHIATRY | Facility: CLINIC | Age: 54
End: 2024-10-31
Attending: PSYCHOLOGIST
Payer: COMMERCIAL

## 2024-10-31 ENCOUNTER — VIRTUAL VISIT (OUTPATIENT)
Dept: PSYCHIATRY | Facility: CLINIC | Age: 54
End: 2024-10-31
Payer: COMMERCIAL

## 2024-10-31 ENCOUNTER — TELEPHONE (OUTPATIENT)
Dept: FAMILY MEDICINE | Facility: CLINIC | Age: 54
End: 2024-10-31
Payer: COMMERCIAL

## 2024-10-31 VITALS — HEIGHT: 64 IN | WEIGHT: 236 LBS | BODY MASS INDEX: 40.29 KG/M2

## 2024-10-31 DIAGNOSIS — F33.1 MDD (MAJOR DEPRESSIVE DISORDER), RECURRENT EPISODE, MODERATE (H): Primary | ICD-10-CM

## 2024-10-31 DIAGNOSIS — F41.1 GAD (GENERALIZED ANXIETY DISORDER): ICD-10-CM

## 2024-10-31 PROCEDURE — G2211 COMPLEX E/M VISIT ADD ON: HCPCS | Mod: 95

## 2024-10-31 PROCEDURE — 90837 PSYTX W PT 60 MINUTES: CPT | Mod: 95

## 2024-10-31 PROCEDURE — 99214 OFFICE O/P EST MOD 30 MIN: CPT | Mod: 95

## 2024-10-31 RX ORDER — VENLAFAXINE HYDROCHLORIDE 150 MG/1
150 CAPSULE, EXTENDED RELEASE ORAL DAILY
Qty: 30 CAPSULE | Refills: 1 | Status: SHIPPED | OUTPATIENT
Start: 2024-10-31

## 2024-10-31 RX ORDER — BUSPIRONE HYDROCHLORIDE 30 MG/1
30 TABLET ORAL 2 TIMES DAILY
Qty: 60 TABLET | Refills: 1 | Status: SHIPPED | OUTPATIENT
Start: 2024-10-31

## 2024-10-31 RX ORDER — VENLAFAXINE HYDROCHLORIDE 75 MG/1
75 CAPSULE, EXTENDED RELEASE ORAL DAILY
Qty: 30 CAPSULE | Refills: 1 | Status: SHIPPED | OUTPATIENT
Start: 2024-10-31

## 2024-10-31 ASSESSMENT — PATIENT HEALTH QUESTIONNAIRE - PHQ9
SUM OF ALL RESPONSES TO PHQ QUESTIONS 1-9: 19
SUM OF ALL RESPONSES TO PHQ QUESTIONS 1-9: 19
10. IF YOU CHECKED OFF ANY PROBLEMS, HOW DIFFICULT HAVE THESE PROBLEMS MADE IT FOR YOU TO DO YOUR WORK, TAKE CARE OF THINGS AT HOME, OR GET ALONG WITH OTHER PEOPLE: VERY DIFFICULT

## 2024-10-31 ASSESSMENT — ANXIETY QUESTIONNAIRES
7. FEELING AFRAID AS IF SOMETHING AWFUL MIGHT HAPPEN: NOT AT ALL
IF YOU CHECKED OFF ANY PROBLEMS ON THIS QUESTIONNAIRE, HOW DIFFICULT HAVE THESE PROBLEMS MADE IT FOR YOU TO DO YOUR WORK, TAKE CARE OF THINGS AT HOME, OR GET ALONG WITH OTHER PEOPLE: VERY DIFFICULT
GAD7 TOTAL SCORE: 14
3. WORRYING TOO MUCH ABOUT DIFFERENT THINGS: NEARLY EVERY DAY
1. FEELING NERVOUS, ANXIOUS, OR ON EDGE: MORE THAN HALF THE DAYS
8. IF YOU CHECKED OFF ANY PROBLEMS, HOW DIFFICULT HAVE THESE MADE IT FOR YOU TO DO YOUR WORK, TAKE CARE OF THINGS AT HOME, OR GET ALONG WITH OTHER PEOPLE?: VERY DIFFICULT
GAD7 TOTAL SCORE: 14
GAD7 TOTAL SCORE: 14
4. TROUBLE RELAXING: NEARLY EVERY DAY
5. BEING SO RESTLESS THAT IT IS HARD TO SIT STILL: NOT AT ALL
7. FEELING AFRAID AS IF SOMETHING AWFUL MIGHT HAPPEN: NOT AT ALL
2. NOT BEING ABLE TO STOP OR CONTROL WORRYING: NEARLY EVERY DAY
6. BECOMING EASILY ANNOYED OR IRRITABLE: NEARLY EVERY DAY

## 2024-10-31 NOTE — PROGRESS NOTES
[facility]    OUTPATIENT PSYCHOTHERAPY PROGRESS NOTE    Patient: Cheyenne Schulte (1970), MRN: [id number]    Diagnosis:   1. MDD (major depressive disorder), recurrent episode, moderate (H)    2. SERGIO (generalized anxiety disorder)       Provider: RACHEAL REESE MD  Date of Service:  10/31/24    Service: 32031 - Psychotherapy (with patient) - 53+ minutes  Start Time: 4:01  End Time: 5:04  In- Person at the [facility]: No    Extended Session (60+ minutes): No  Interactive Complexity: No  Crisis: No    Video Visit Details:   Telemedicine Visit: The patient's condition can be safely assessed and treated via synchronous audio and visual telemedicine encounter.  Reason for Video Visit: Patient has requested telehealth visit  Originating location (patient location):  [facility]   Location- Patient's home  Distant Site (provider location): HIPAA compliant location - [facility] MENTAL [facility] & [facility]DICTION [facility]  Platform used for video visit:  Secure real time interactive audio and visual telecommunication system via [employer]    Individuals Present:   Client attended alone    Medication utilization: No changes to current psychiatric medication(s) ; managed by different provider      Subjective:  Today Janice provided update on ongoing health concerns and frustrations with insurance coverage. She had appt with primary care and is hoping to get some medications for weight loss. Plans to see a different weight loss medication provider in a couple weeks as her insurance likely will not cover GLP1 med. She got her colonoscopy done this week which went well. Continues to have a cold so has been trying to get lots of rest. Checked in regarding depression and anxiety symptoms. Brother was diagnosed with liver cancer this week and is waiting to hear back whether he will be put on transplant list. This has been devastating news  to her and made her feel that her struggles are not significant in comparison. Is planning on switching medications soon, will try zoloft.    She is continuing to work on strategies to reach goal of hip surgery in the next few months.  Reviewed potential triggers and patterns with emotional dysregulation. Talked through links of thoughts, behaviors, feelings. Has been practicing TIPP skills, including square breathing and ice packs. Plan made to continue working on self esteem building activities such as playing guitar and self affirmation. Will continue to work on skills and various activities to distract from anxious thought patterns. Goal to challenge core beliefs that she is not worthy of good things or that she will fail.      Treatment interventions:    Psychotherapy Interventions - Supportive : Active listening, Encouragement, praise, ego support, Identification of strengths, and Goal-setting    Psychotherapy Interventions - Motivational Interviewing : Promote hope and positive expectations    Coached on coping techniques/relaxation skills to help improve distress tolerance and managing intense emotions.    Psychotherapy Interventions - Psychodynamic : Explore emotions, especially contradicting/confusing ones and Encourage nuanced understanding of interpersonal relationships    Mental Status Exam    General: alert  and oriented adequately groomed  Behavior/Demeanor: cooperative and pleasant, with good  eye contact   Speech: regular rate and rhythm Intact. Normal volume, tory. No prosody.  Psychomotor: normal or unremarkable  Mood:  overwhelmed  Affect: tearful at times; intensity heightened, congruent to mood and congruent to content  Thought Process/Associations: unremarkable  Thought Content:  Reports none;  no evidence of suicidal or violent ideation  Insight: good  Judgment: good    Assessment and Progress:     Behavioral Observations: Janice has had difficulty navigating health system, ongoing  frustrations with insurance coverage and overall health anxiety. Has challenges with poor self esteem which impact her sense of self and her relationships with loved ones. Emotional regulation component difficult with higher anxiety levels and disappointments related to health care. She has been utilizing TIPP skills and continues to work on self esteem building exercises.    Assessment: Janice is making adequate progress towards treatment goals. No current safety concerns.      Plan:     Next therapy appointment has been scheduled for next week to continue work on treatment goals.    For homework, patient agreed to reflect on patterns that lead to sense of overwhelm and to engage in more music related activities and self affirmation for esteem building. She will also try to implement TIPP skills and chair yoga for anxiety. Goal to challenge core beliefs that she is not worthy of good things or that she will fail.    Treatment Plan          SYMPTOMS; PROBLEMS   MEASURABLE GOALS;     INTERVENTIONS;   GAINS MADE DISCHARGE CRITERIA   Emotional regulation and anxiety   -journal/document triggers for anxiety  -reflect on patterns which lead to overwhelm and/or panic  -develop strategies for thought distraction when ruminating  -TIPP skills  -chair yoga homework assignments  self-care skills    -has used ice packs and box breathing marked symptom improvement   Self esteem, interpersonal relationships   report feeling more positive about self   -accept guidance and help from others  -work on doing 1 music related activity per week   building on strengths  psychotherapy marked symptom improvement      Health journey and self care   -goal of losing 10 lbs for hip replacement surgery    -work on movement and diet -appointment made for med management of weight loss  -nutrition referral placed by psychiatrist Improved functional status        Date of most recent treatment plan: 10/17/24  Date next treatment plan due: 3  months    Patient agreed with the above treatment plan on 10/17/24. Discussed case with supervisor who also agreed with the treatment plan. Unable to sign in person due to visit being conducted through telehealth.     Name of provider: RACHEAL REESE MD  Name of supervisor (if learner): Jourdan Marquis and Dr. Torres  Patient care discussed in supervision with above supervisor, who will review and sign note.

## 2024-10-31 NOTE — NURSING NOTE
Current patient location: [address]   [facility] [address]    Is the patient currently in the state of [place]? YES    Visit mode:VIDEO    If the visit is dropped, the patient can be reconnected by: VIDEO VISIT: Send to e-mail at: [email or web address]    Will anyone else be joining the visit? NO  (If patient encounters technical issues they should call [phone])    Are changes needed to the allergy or medication list? No    Are refills needed on medications prescribed by this physician? NO    Rooming Documentation:  Questionnaire(s) completed    Reason for visit: RECHYENIFER VILLAF

## 2024-10-31 NOTE — PROGRESS NOTES
"Virtual Visit Details    Type of service:  Video Visit     Originating Location (pt. Location): {video visit patient location:302208::\"Home\"}  {PROVIDER LOCATION On-site should be selected for visits conducted from your clinic location or adjoining [facility] hospital, academic office, or other nearby [facility] building. Off-site should be selected for all other provider locations, including home:823576}  Distant Location (provider location):  {virtual location provider:788652}  Platform used for Video Visit: {Virtual Visit Platforms:791274::\"Emerging Threats\"}  "

## 2024-10-31 NOTE — PATIENT INSTRUCTIONS
Medication Plan  -Continue melatonin 3mg nightly (take at least 1 hour before intended bedtime)  -Continue venlafaxine 225mg daily  -Continue Buspar 30mg twice daily     **For crisis resources, please see the information at the end of this document**   Patient Education    Thank you for coming to the [facility] & [facility] [place] [facility].     Lab Testing:  If you had lab testing today and your results are reassuring or normal they will be mailed to you or sent through [employer] within 7 days. If the lab tests need quick action we will call you with the results. The phone number we will call with results is # [phone]. If this is not the best number please call our clinic and change the number.     Medication Refills:  If you need any refills please call your pharmacy and they will contact us. Our fax number for refills is [phone].   Three business days of notice are needed for general medication refill requests.   Five business days of notice are needed for controlled substance refill requests.   If you need to change to a different pharmacy, please contact the new pharmacy directly. The new pharmacy will help you get your medications transferred.     Contact Us:  Please call [phone] during business hours (8-5:00 M-F).   If you have medication related questions after clinic hours, or on the weekend, please call [phone].     Financial Assistance [phone]   Medical Records [phone]       MENTAL HEALTH CRISIS RESOURCES:  For a emergency help, please call 911 or go to the nearest Emergency Department.     Emergency Walk-In Options:   EmPATH Unit @ [facility] ([place]): [phone] - Specialized mental health emergency area designed to be calming  [facility] ([place]): [phone]  [facility] ([place]): [phone]  [facility]): [phone]    [facility] Crisis Information:   [place]:  [phone]  [place]: [phone]  Saurabh (COPE) - Adult: [phone]     Child: [phone]  [place] - Adult: [phone]     Child: [phone]  [place]: [phone]  List of all [facility] resources:   https://[place].gov/dhs/people-we-serve/adults/health-care/mental-health/resources/crisis-contacts.jsp    National Crisis Information:   Crisis Text Line: Text  [place]  to 946103  Suicide & Crisis Lifeline: 988  National Suicide Prevention Lifeline: 6-793-454-TALK (1-602.117.4474)       For online chat options, visit https://suicidepreventionlifeline.org/chat/  Poison Control Center: 3-770-267-8651  Trans Lifeline: 1-123.298.1646 - Hotline for transgender people of all ages  The [employer] Project: 0-958-850-7555 - Hotline for LGBT youth     For Non-Emergency Support:   Fast Tracker: Mental Health & Substance Use Disorder Resources -   https://www.MobFoxckMeuugamen.org/

## 2024-10-31 NOTE — PROGRESS NOTES
Virtual Visit Details    Type of service:  Video Visit     Originating Location (pt. Location): Home    Distant Location (provider location):  Off-site  Platform used for Video Visit: [facility]  MEDICAL PROGRESS NOTE       Cheyenne Schulte is a 54 year old who prefers the name Janice    Initial consultation on 08/16/24.      CARE TEAM:   PCP- [facility]  Therapist-  Mari Foster MD               Assessment & Plan     History and interview support the following diagnoses:   Generalized anxiety disorder  Major depressive disorder, recurrent, moderate    Janice is a 54-year-old adult with psychiatric history of depression, anxiety, and ADHD (by history) who presents for psychiatric follow-up.     Janice was last seen on 09/23/24 at which time venlafaxine was decreased to 225mg daily. Today, Janice reports worsened mood and anxiety in the setting of recent healthcare related stressors. She presents in significant distress and tearfulness today. We focused on ways she can support and take care of herself despite recent stressors. Her biggest concern today related to being told she needs to lose 10 pounds prior to moving forward with hip surgery which is scheduled in February. She is working with PCP for medications that will help with this but has been running into insurance coverage issues. She is feeling very frustrated and hopeless. Denies SI and was actively engaged in brainstorming ways to support her mental health. She was open to a nutrition referral for help with meal planning. We did discuss switching antidepressants as she is unsure of benefit from current regimen and higher doses of venlafaxine have been ineffective/led to notable discontinuation symptoms with missed doses. Given her acute stress level, we opted to hold off medication changes today. At her next visit we will re-assess and consider switching  from venlafaxine to sertraline. She is now established with a therapist which she is finding beneficial.     Assessment from initial consultation on 08/16/24:  Overall Janice is doing well. Her most recent major depressive episode was several years ago after  from ex-. She does endorse mild symptoms of suboptimally treated depression including anhedonia and amotivation and there is room for improvement in his area. She denies SI/HI/NSSI/AH/VH. Substance use, which includes regular cannabis use and alcohol use on the weekends, does not appear to be impacting the clinical picture. She completed ADHD testing several years ago and agrees to forward these results to clinic if she is able to locate a copy of the testing.     Psychotherapy discussion: Primary recommendation for the treatment of mood symptoms, especially anxiety. Supportive therapy can be useful for reducing the impact of acute or chronic psychosocial stressors. CBT can be particularly helpful for ruminative thinking and addressing maladaptive coping mechanisms that may worsen anxiety. Referral for therapy was placed today, 08/16/24.     Medication discussion: Janice notes that she is interested in reducing venlafaxine. Overall she feels that her previous provider just kept increasing her medications and she is unsure as to whether she is on the most effective regimen. She denies ASE from current medications but is prone to discontinuation symptoms with venlafaxine. She is prescribed higher doses of Buspar, Effexor, and Adderall (although she hasn't been taking Adderall for quite some time; unable to recall the last time she took a dose). Given current serotonergic load, patient may experience symptom improvement with moderate venlafaxine dose reduction. Further, her blood pressures, although stable, have more recently been on the higher end, another reason to move forward with a dose reduction.     Plan is to first reduce venlafaxine and  monitor for worsening of anxiety or depressive symptoms. We also discussed the potential for discontinuation symptoms which she has experienced in the past with previous missed doses. Will plan to reduce by increment of 37.5mg to reduce risk of discontinuation symptoms. Patient in agreement with plan.     PSYCHOTROPIC DRUG INTERACTIONS: **Italicized interactions are for treatment plan options not currently implemented**  ADDITIVE SEROTONERGIC: Buspar, venlafaxine, Adderall    MANAGEMENT:  use lowest therapeutic doses of psychotropic medications, routine monitoring, and MTM completed  07/02/24    [place] was [place] today: indicates no dispenses of Adderall over the past 1 year              Plan    1) PSYCHOTROPIC MEDICATION RECOMMENDATIONS:  -Continue melatonin 3mg nightly (take at least 1 hour before intended bedtime)  -Continue venlafaxine 225mg daily  -Continue Buspar 30mg twice daily     2) THERAPY:  Mari Foster MD     3) NEXT DUE:   Labs- Routine monitoring is not indicated for current psychotropic medication regimen   ECG- Routine monitoring is not indicated for current psychotropic medication regimen   Rating Scales- N/A    4) REFERRALS / COORDINATION: Therapy referral through [facility] (prefers female)    5) DISPOSITION: 12/6 at 12:30p    Treatment Risk Statement:  The patient understands the risks, benefits, adverse effects and alternatives. Agrees to treatment with the capacity to do so. No medical contraindications to treatment. Agrees to contact provider for any problems. The patient understands to call 911 or go to the nearest ED if urgent or life threatening symptoms occur. Crisis contact numbers are provided routinely in the After Visit Summary.    Suicide Risk Assessment:  Janice did not appear to be an imminent safety risk to self or others.    PROVIDER:  ALTHEA Ramey CNP              Pertinent Background  Candida notes history of depression and was started on Lexapro in  "her 20s. Anxiety developed after her pregnancies. Last experienced SI around 2021 in the context of marital problems and separation.   Psych pertinent item history includes suicidal ideation              Interim History    Janice was last seen on 09/23/24 at which time venlafaxine was decreased to 225mg daily. Since the last visit:  -Notes that this month has been \"really crappy.\" Stressors include: healthcare related stressors (colonoscopy has been rescheduled multiple times, was told that she won't be able to move forward with hip procedure until she loses 10 pounds), brother recently diagnosed with liver cancer.  -Anxiety has been high. She's picking at her skin a lot. Appetite has been lower.   -Sedentary because of hip pain. She is fearful of falling.   -Has been using TIPP skills, paced breathing.   -Depression is \"awful.\" Reports hopelessness, low self-esteem. Denies SI.  -Hip surgery through [facility] - February 2025    Plan:  -Support- Partner Chase, best-friend Izzy.  -Spending time with Chase, has distractions planned for the weekend- watching Rugby, going out for dinner  -Brainstorm ways to incorporate more mindfulness/movement strategies like chair/seated yoga, light weights, walking around the mall    Stressors: Mom passed away January 2023.     Medication ASE: Denies   Medication adherence: Denies concerns    Recent Psych Symptoms:   Depression:   hopelessness, low self-esteem  Elevated:  none  Psychosis:  none  Anxiety:  excessive worry  Trauma Related:  none  Insomnia:  Yes: problems with sleep initiation  Other:  No    Pertinent Negatives: No suicidal or violent ideation, psychosis, or hypo/kemal.      Pertinent Social Hx:  FINANCIAL SUPPORT- Works in [employer] for [employer].  LIVING SITUATION / RELATIONSHIPS- Lives with 15-year-old son who splits time with his father. Currently involved with romantic partner. Feels safe and supported in this relationship.   SOCIAL/ SPIRITUAL SUPPORT- Partner, " "children    Pertinent Substance Use  Alcohol- Drinks beer on the weekends.   Nicotine- 1 PPD. Not quite ready to quit.   Caffeine- Daily tea drinker  Opioids- None  Narcan Kit- N/A  THC/CBD- Smokes cannabis about 3-4 times per week largely due to pain.   Other Illicit Drugs- none              Medical Review of Systems   Dizziness/orthostasis- Denies dizziness or recent falls.   Headaches- Hx of migraines; treated about 10-15 years ago for this. No recent problems.   Respiratory- Hx of asthma; uses rescue inhaler but tends to only use with URIs  Cardiovascular- History of \"heart fluttering.\" Occurs very rarely.   Gastrointestinal- Denies nausea, vomiting, constipation, diarrhea  Pain- chronic hip pain; receives cortisone injections  Sexual health concerns- None. LMP January 2024.  A comprehensive review of systems was performed and is negative other than noted above.              Psych Summary Points  08/2024: Initial consultation 08/2024; decreased venlafaxine from 300mg to 262.5mg daily  09/2024: Decreased venlafaxine from 262.5 to 225mg daily  10/2024: Established with new therapist through this clinic.               Past Psychotropic Medications     Medications Max dose Dates/Duration Discontinuation Reason Other Notes   citalopram   1990s Pregnancy?     bupropion   Early 2000s Wasn't sleeping for 5 days, then developed rash Used to quit smoking   Escitalopram    1990s                     Past Medical History     Medication allergies:    Allergies   Allergen Reactions    Theophylline Cr     Zyban [Bupropion Hydrobromide]     Bupropion Rash     Neurologic Hx [head injury, seizures, etc.]: Denies hx of concussion or seizure.   Patient Active Problem List   Diagnosis    Intermittent asthma    Cervical high risk HPV (human papillomavirus) test positive    Papanicolaou smear of cervix with low grade squamous intraepithelial lesion (LGSIL)     Past Medical History:   Diagnosis Date    ADHD (attention deficit " "hyperactivity disorder)     sees psych     Arthritis 5/26/23    It s probably in my right hip too    Depressive disorder 1990 s    Diabetes (H) 2008    Gestational 2008    Family history of colon cancer     Intermittent asthma     Mild major depression (H)     sees psych      Contraception- Depo Provera              Medications   Current Outpatient Medications   Medication Sig Dispense Refill    albuterol (PROAIR HFA/PROVENTIL HFA/VENTOLIN HFA) 108 (90 Base) MCG/ACT inhaler Inhale 2 puffs into the lungs every 6 hours 18 g 3    bisacodyl (DULCOLAX) 5 MG EC tablet Two days prior to exam take two (2) tablets at 4pm. One day prior to exam take two (2) tablets at 4pm 4 tablet 0    busPIRone HCl (BUSPAR) 30 MG tablet Take 1 tablet (30 mg) by mouth 2 times daily. 60 tablet 1    MOUNJARO 2.5 MG/0.5ML SOAJ Inject 0.5 mLs (2.5 mg) subcutaneously every 7 days. 2 mL 1    nicotine (NICODERM CQ) 14 MG/24HR 24 hr patch Place 1 patch onto the skin every 24 hours      nicotine (NICODERM CQ) 21 MG/24HR 24 hr patch Place 1 patch onto the skin every 24 hours      nicotine (NICODERM CQ) 7 MG/24HR 24 hr patch Place 1 patch onto the skin every 24 hours      nicotine polacrilex (NICORETTE) 4 MG gum How to take it: When the urge to smoke occurs, chew gum until you feel a tingle, then \"park\" the gum in your cheek until the tingle is gone. Re-chew every few minutes and \"park\" again, chewing one piece for 30 minutes. Do not eat or drink while chewing. Follow this schedule: Weeks 1 to 6: One piece of gum every 1 to 2 hours. Use at least 9 pieces a day, but no more than 24. Weeks 7 to 9: One piece of gum every 2 to 4 hours. Weeks 10 to 12: One piece of gum every 4 to 8 hours.      ondansetron (ZOFRAN) 4 MG tablet Take one tablet every six hours as needed for nausea during colonoscopy bowel prepping 6 tablet 0    polyethylene glycol (GOLYTELY) 236 g suspension Two days before procedure at 5PM fill first container with water. Mix and drink an 8 oz " "glass every 10-15 minutes until HALF of the container is gone. Place the remainder in the refrigerator. One day before procedure at 5PM drink second half of bowel prep. Drink an 8 oz glass every 10-15 minutes until it is gone. Day of procedure 6 hours before arrival time fill the 2nd container with water. Mix and drink an 8 oz glass every 10-15 minutes until HALF of the container is gone. Discard the remaining solution. 8000 mL 0    tirzepatide-Weight Management (ZEPBOUND) 2.5 MG/0.5ML prefilled pen Inject 0.5 mLs (2.5 mg) subcutaneously every 7 days. 2 mL 2    venlafaxine (EFFEXOR XR) 150 MG 24 hr capsule Take 1 capsule (150 mg) by mouth daily. Take along with a 75mg capsule for a total daily dose of 225mg 30 capsule 1    venlafaxine (EFFEXOR XR) 75 MG 24 hr capsule Take 1 capsule (75 mg) by mouth daily. Take along with with a 150mg capsule for a total daily dose of 225mg 30 capsule 1                 Physical Exam  (Vitals Only)  Ht 1.619 m (5' 3.75\")   Wt 107 kg (236 lb)   BMI 40.83 kg/m      Pulse Readings from Last 5 Encounters:   10/28/24 77   10/28/24 82   06/25/24 92   06/06/24 92   12/26/23 81     Wt Readings from Last 5 Encounters:   10/31/24 107 kg (236 lb)   10/28/24 107 kg (236 lb)   06/25/24 106.4 kg (234 lb 9.6 oz)   06/06/24 105.2 kg (232 lb)   12/26/23 107 kg (236 lb)     BP Readings from Last 5 Encounters:   10/28/24 108/66   06/25/24 135/81   06/06/24 123/70   12/26/23 132/83   06/20/23 101/80                 Mental Status Exam  Alertness: alert  and oriented  Appearance: adequately groomed  Behavior/Demeanor: cooperative, pleasant, and calm, with good eye contact   Speech: normal and regular rate and rhythm  Language: intact and no problems  Psychomotor: normal or unremarkable  Mood: depressed, anxious, and angry  Affect: full range, tearful, and appropriate; was congruent to mood  Thought Process/Associations: unremarkable  Thought Content:  Reports none;  Denies suicidal ideation, violent " ideation, and delusions  Perception:  Reports none;  Denies auditory hallucinations and visual hallucinations  Insight: good  Judgment: good  Cognition: does  appear grossly intact; formal cognitive testing was not done  oriented: time, person, and place  Gait and Station:  N/A (telehealth)                Data       8/12/2024     8:11 AM   PROMIS-10 Total Score w/o Sub Scores   PROMIS TOTAL - SUBSCORES 28         8/12/2024     8:12 AM   CAGE-AID Total Score   Total Score 0   Total Score MyChart 0 (A total score of 2 or greater is considered clinically significant)         5/26/2022     9:10 AM 8/16/2024     9:44 AM 10/31/2024    12:46 PM   PHQ   PHQ-9 Total Score 3 4 19    Q9: Thoughts of better off dead/self-harm past 2 weeks Not at all Not at all  Not at all        Patient-reported         5/26/2022     9:10 AM 10/31/2024    12:47 PM   SERGIO-7 SCORE   Total Score  14 (moderate anxiety)   Total Score 4 14        Patient-reported       Liver/kidney function Metabolic Blood counts   Recent Labs   Lab Test 06/06/24  0821 06/07/22  0845   CR 0.93 0.82   AST 16 4   ALT 16 22   ALKPHOS 99 75    Recent Labs   Lab Test 06/06/24  0821   CHOL 207*   TRIG 106   *   HDL 54   A1C 5.6    No lab results found.     No results found for this or any previous visit.    Administrative Billing:     Level of Medical Decision Making:   - At least 1 chronic problem that is not stable  - Engaged in prescription drug management during visit (discussed any medication benefits, side effects, alternatives, etc.)    The longitudinal plan of care for the diagnosis(es)/condition(s) as documented above were addressed during this visit. Due to the added complexity in care, I will continue to support Janice in the subsequent management and with ongoing continuity of care.

## 2024-11-07 ENCOUNTER — VIRTUAL VISIT (OUTPATIENT)
Dept: PHARMACY | Facility: CLINIC | Age: 54
End: 2024-11-07
Attending: FAMILY MEDICINE
Payer: COMMERCIAL

## 2024-11-07 ENCOUNTER — VIRTUAL VISIT (OUTPATIENT)
Dept: PSYCHIATRY | Facility: CLINIC | Age: 54
End: 2024-11-07
Attending: PSYCHOLOGIST
Payer: COMMERCIAL

## 2024-11-07 ENCOUNTER — TELEPHONE (OUTPATIENT)
Dept: FAMILY MEDICINE | Facility: CLINIC | Age: 54
End: 2024-11-07

## 2024-11-07 ENCOUNTER — MYC MEDICAL ADVICE (OUTPATIENT)
Dept: FAMILY MEDICINE | Facility: CLINIC | Age: 54
End: 2024-11-07
Payer: COMMERCIAL

## 2024-11-07 DIAGNOSIS — F41.1 GAD (GENERALIZED ANXIETY DISORDER): ICD-10-CM

## 2024-11-07 DIAGNOSIS — E66.9 OBESITY, UNSPECIFIED CLASS, UNSPECIFIED OBESITY TYPE, UNSPECIFIED WHETHER SERIOUS COMORBIDITY PRESENT: Primary | ICD-10-CM

## 2024-11-07 DIAGNOSIS — F33.1 MDD (MAJOR DEPRESSIVE DISORDER), RECURRENT EPISODE, MODERATE (H): Primary | ICD-10-CM

## 2024-11-07 DIAGNOSIS — F39 MOOD DISORDER (H): ICD-10-CM

## 2024-11-07 PROCEDURE — 99207 PR NO CHARGE LOS: CPT | Mod: 93

## 2024-11-07 NOTE — PROGRESS NOTES
[facility]    OUTPATIENT PSYCHOTHERAPY PROGRESS NOTE    Patient: Cheyenne Schulte (1970), MRN: [id number]    Diagnosis:   No diagnosis found.     Provider: RACHEAL REESE MD  Date of Service:  11/07/24    Service: 73635 - Psychotherapy (with patient) - 53+ minutes  Start Time: 4:02  End Time: 5:01  In- Person at the [facility]: No    Extended Session (60+ minutes): No  Interactive Complexity: No  Crisis: No    Video Visit Details:   Telemedicine Visit: The patient's condition can be safely assessed and treated via synchronous audio and visual telemedicine encounter.  Reason for Video Visit: Patient has requested telehealth visit  Originating location (patient location):  [facility]   Location- Patient's home  Distant Site (provider location): HIPAA compliant location - Provider Remote Setting- Home Office  Platform used for video visit:  Secure real time interactive audio and visual telecommunication system via [employer]    Individuals Present:   Client attended alone    Medication utilization: No changes to current psychiatric medication(s) ; managed by different provider      Subjective:  Today Janice provided update on ongoing health concerns. Feels optimistic about treatment options after appt with weight loss provider. Checked in regarding depression and anxiety symptoms. Brother was diagnosed with liver cancer this month. He will likely be put on transplant list and is coping with the situation in healthy ways. Is planning on switching medications soon, will try zoloft.    She is continuing to work on strategies to reach goal of hip surgery in the next few months. She signed up for some nutrition and health classes through insurance program. Has been practicing TIPP skills, including square breathing and ice packs. Plan made to continue working on self esteem building activities such as playing guitar and self affirmation.  Will continue to work on skills and various activities to distract from anxious thought patterns.       Treatment interventions:    Psychotherapy Interventions - Supportive : Active listening, Encouragement, praise, ego support, Identification of strengths, Skill building, Validation, and Goal-setting    Psychotherapy Interventions - Motivational Interviewing : Promote hope and positive expectations    Coached on coping techniques/relaxation skills to help improve distress tolerance and managing intense emotions.    Psychotherapy Interventions - Psychodynamic : Enhance insight via self-examination    Mental Status Exam    General: alert  and oriented adequately groomed  Behavior/Demeanor: cooperative and pleasant, with good  eye contact   Speech: regular rate and rhythm Intact. Normal volume, tory. No prosody.  Psychomotor: normal or unremarkable  Mood:  better  Affect: appropriate, bright ; congruent to mood and congruent to content  Thought Process/Associations: unremarkable  Thought Content:  Reports none;  no evidence of suicidal or violent ideation  Insight: good  Judgment: good    Assessment and Progress:     Behavioral Observations: Janice has had difficulty navigating health system and dealt with some changes in timeline for hip replacement. Has challenges with poor self esteem which impact her sense of self and her relationships with loved ones. Emotional regulation component difficult with higher anxiety levels and disappointments related to health care, but has been better this week. She has been utilizing TIPP skills and continues to work on self esteem building exercises.    Assessment: Janice is making adequate progress towards treatment goals. No current safety concerns.      Plan:     Next therapy appointment has been scheduled for next week to continue work on treatment goals.    For homework, patient agreed to engage in more music related activities and self affirmation for esteem building. She  will also continue to implement TIPP skills for anxiety. Goal to challenge core beliefs that she is not worthy of good things or that she will fail.    Treatment Plan          SYMPTOMS; PROBLEMS   MEASURABLE GOALS;     INTERVENTIONS;   GAINS MADE DISCHARGE CRITERIA   Emotional regulation and anxiety   -journal/document triggers for anxiety  -reflect on patterns which lead to overwhelm and/or panic  -develop strategies for thought distraction when ruminating  -TIPP skills  -chair yoga homework assignments  self-care skills    -has used ice packs and box breathing marked symptom improvement   Self esteem, interpersonal relationships   report feeling more positive about self   -accept guidance and help from others  -work on doing 1 music related activity per week   building on strengths  psychotherapy marked symptom improvement      Health journey and self care   -goal of losing 10 lbs for hip replacement surgery    -work on movement and diet -appointment made for med management of weight loss  -nutrition referral placed by psychiatrist Improved functional status        Date of most recent treatment plan: 10/17/24  Date next treatment plan due: 3 months    Patient agreed with the above treatment plan on 10/17/24. Discussed case with supervisor who also agreed with the treatment plan. Unable to sign in person due to visit being conducted through telehealth.     Name of provider: RACHEAL REESE MD  Name of supervisor (if learner): Jourdan Marquis and Dr. Torres  Patient care discussed in supervision with above supervisor, who will review and sign note.

## 2024-11-07 NOTE — TELEPHONE ENCOUNTER
Please initiate a prior authorization    Thank you,  Monet Cardona, Pharmacy Technician  [facility] [place]

## 2024-11-07 NOTE — PATIENT INSTRUCTIONS
"Recommendations from today's MTM visit:                                                    MTM (medication therapy management) is a service provided by a clinical pharmacist designed to help you get the most of out of your medicines.      Start Wegovy 0.25 mg once a week    Follow-up: Return in about 4 weeks (around 12/5/2024).    It was great speaking with you today.  I value your experience and would be very thankful for your time in providing feedback in our clinic survey. In the next few days, you may receive an email or text message from [employer] with a link to a survey related to your  clinical pharmacist.\"     To schedule another MTM appointment, please call the clinic directly or you may call the MTM scheduling line at [phone] or toll-free at [phone].     My Clinical Pharmacist's contact information:                                                      Please feel free to contact me with any questions or concerns you have.      Jose De Jesus Howard, PharmD   "

## 2024-11-07 NOTE — PROGRESS NOTES
Medication Therapy Management (MTM) Encounter    ASSESSMENT:                            Medication Adherence/Access: No issues identified.    Mood Disorder:  Stable    Being managed by another provider    Weight Management:   Not at goal     PharmD educated Janice on GLP1 agonists and the expected weight loss on max dose. PharmD educated on potential side effects. Janice agreed to starting semaglutide 0.25 mg once a week      Jose De Jesus Howard PharmD  Medication Therapy Management Pharmacist  [phone]     PLAN:                            Start Wegovy 0.25 mg once a week    Follow-up: Return in about 4 weeks (around 12/5/2024).    SUBJECTIVE/OBJECTIVE:                          Janice Schulte is a 54 year old female seen for an initial visit. She was referred to me from Dr. Marks.      Reason for visit: Medication Therapy Management.    Allergies/ADRs: Reviewed in chart  Past Medical History: Reviewed in chart  Tobacco: She reports that she has been smoking cigarettes. She has a 30 pack-year smoking history. She has never used smokeless tobacco.Nicotine/Tobacco Cessation Plan  Information offered: Patient not interested at this time    Alcohol: Did not discuss    Medication Adherence/Access: no issues reported.    Mental Health   -Buspirone 30 mg twice a day  -Venlafaxine 225mg once daily.   Patient reports no current medication side effects.       Weight Management   -None  Patient reports no current medication side effects.  Nutrition/Eating Habits: Not much of a snacker.    Exercise/Activity: Is limited due to hip.   Medications Tried/Failed:  None.     Initial Consult Weight: 237 lbs Goal is 225 lbs by February and ideal weight is 170 lbs     Current weight today: 0 lbs 0 oz      Wt Readings from Last 4 Encounters:   10/28/24 236 lb (107 kg)   06/25/24 234 lb 9.6 oz (106.4 kg)   06/06/24 232 lb (105.2 kg)   12/26/23 236 lb (107 kg)     Estimated body mass index is 40.83 kg/m  as calculated from the following:     "Height as of 10/31/24: 5' 3.75\" (1.619 m).    Weight as of 10/31/24: 236 lb (107 kg).        Today's Vitals: There were no vitals taken for this visit.  ----------------      I spent 25 minutes with this patient today. All changes were made via collaborative practice agreement with Dr. Marks. A copy of the visit note was provided to the patient's provider(s).    A summary of these recommendations was sent via [employer].    Jose De Jesus FloresTommy    Telemedicine Visit Details  The patient's medications can be safely assessed via a telemedicine encounter.  Type of service:  Telephone visit  Originating Location (pt. Location): Home    Distant Location (provider location):  On-site  Start Time:  10:30 AM  End Time:  10:55 AM     Medication Therapy Recommendations  Obesity, unspecified class, unspecified obesity type, unspecified whether serious comorbidity present   1 Current Medication: Semaglutide-Weight Management (WEGOVY) 0.25 MG/0.5ML pen   Current Medication Sig: Inject 0.25 mg subcutaneously once a week.   Rationale: Synergistic therapy - Needs additional medication therapy - Indication   Recommendation: Start Medication   Status: Accepted per CPA   Identified Date: 11/7/2024 Completed Date: 11/7/2024            "

## 2024-11-07 NOTE — Clinical Note
PharmD met with Janice today to discuss starting a GLP1 agonist. She agreed to start compounded semaglutide today and will follow up in 4 weeks

## 2024-11-11 ENCOUNTER — PATIENT OUTREACH (OUTPATIENT)
Dept: GASTROENTEROLOGY | Facility: CLINIC | Age: 54
End: 2024-11-11
Payer: COMMERCIAL

## 2024-11-12 NOTE — TELEPHONE ENCOUNTER
Central Prior Authorization Team   Phone: [phone]    PA Initiation    Medication: Wegovy  Insurance Company: [employer] ([facility]) - Phone [phone] Fax [phone]  Pharmacy Filling the Rx: [facility] - [address]  Filling Pharmacy Phone: [phone]  Filling Pharmacy Fax:    Start Date: 11/12/2024    Manually faxed in PA request.

## 2024-11-14 ENCOUNTER — VIRTUAL VISIT (OUTPATIENT)
Dept: PSYCHIATRY | Facility: CLINIC | Age: 54
End: 2024-11-14
Attending: PSYCHOLOGIST
Payer: COMMERCIAL

## 2024-11-14 DIAGNOSIS — F41.1 GAD (GENERALIZED ANXIETY DISORDER): Primary | ICD-10-CM

## 2024-11-14 DIAGNOSIS — F33.1 MDD (MAJOR DEPRESSIVE DISORDER), RECURRENT EPISODE, MODERATE (H): ICD-10-CM

## 2024-11-14 PROCEDURE — 90837 PSYTX W PT 60 MINUTES: CPT | Mod: 95

## 2024-11-14 NOTE — PROGRESS NOTES
[facility]    OUTPATIENT PSYCHOTHERAPY PROGRESS NOTE    Patient: Cheyenne Schulte (1970), MRN: [id number]    Diagnosis:   1. SERGIO (generalized anxiety disorder)    2. MDD (major depressive disorder), recurrent episode, moderate (H)         Provider: RACHEAL REESE MD  Date of Service:  11/14/24    Service: 44907 - Psychotherapy (with patient) - 53+ minutes  Start Time: 4:03  End Time: 5:06  In- Person at the [facility]: No    Extended Session (60+ minutes): No  Interactive Complexity: No  Crisis: No    Video Visit Details:   Telemedicine Visit: The patient's condition can be safely assessed and treated via synchronous audio and visual telemedicine encounter.  Reason for Video Visit: Patient has requested telehealth visit  Originating location (patient location):  [facility]   Location- Patient's home  Distant Site (provider location): HIPAA compliant location - Provider Remote Setting- Home Office  Platform used for video visit:  Secure real time interactive audio and visual telecommunication system via [employer]    Individuals Present:   Client attended alone    Medication utilization: No changes to current psychiatric medication(s) ; managed by different provider      Subjective:  Today Janice provided update on ongoing health concerns. Feels optimistic about starting weight loss medication. Checked in regarding depression and anxiety symptoms. Brother will likely be put on transplant list and is coping with the situation in healthy ways. Is planning on switching medications soon, will try zoloft.    She is continuing to work on strategies to reach goal of hip surgery in the next few months. She signed up for some nutrition and health classes through insurance program. She had top reschedule these and plans to start Monday.  Plan made to continue working on self esteem building activities such as playing [employer] and self  affirmation. Will continue to work on skills and various activities to distract from anxious thought patterns. Has goal to make more effort in her appearance over the next couple of weeks, specifically when she goes out with her partner.       Treatment interventions:    Psychotherapy Interventions - Supportive : Active listening, Encouragement, praise, ego support, Identification of strengths, Skill building, Validation, and Goal-setting    Psychotherapy Interventions - Motivational Interviewing : Assess stage of change  and Promote hope and positive expectations    Coached on coping techniques/relaxation skills to help improve distress tolerance and managing intense emotions.    Psychotherapy Interventions - Psychodynamic : Identify meaningful past experiences    Mental Status Exam    General: alert  and oriented adequately groomed  Behavior/Demeanor: cooperative and pleasant, with good  eye contact   Speech: regular rate and rhythm Intact. Normal volume, tory. No prosody.  Psychomotor: normal or unremarkable  Mood:  okay  Affect: appropriate, bright ; congruent to mood and congruent to content  Thought Process/Associations: unremarkable  Thought Content:  Reports none;  no evidence of suicidal or violent ideation  Insight: good  Judgment: good    Assessment and Progress:     Behavioral Observations: Janice has had difficulty navigating health system and dealt with some changes in timeline for hip replacement. Has challenges with poor self esteem which impact her sense of self and her relationships with loved ones. Emotional regulation component difficult with higher anxiety levels and disappointments related to health care, but has been better the last couple of weeks. She has been utilizing TIPP skills and continues to work on self esteem building exercises.    Assessment: Janice is making adequate progress towards treatment goals. No current safety concerns.      Plan:     Next therapy appointment has been  scheduled for next week to continue work on treatment goals.    For homework, patient agreed to engage in more music related activities, self care by spending more time on grooming and self affirmation for esteem building. She will also continue to implement TIPP skills for anxiety. Goal to challenge core beliefs that she is not worthy of good things or that she will fail.    Treatment Plan          SYMPTOMS; PROBLEMS   MEASURABLE GOALS;     INTERVENTIONS;   GAINS MADE DISCHARGE CRITERIA   Emotional regulation and anxiety   -journal/document triggers for anxiety  -reflect on patterns which lead to overwhelm and/or panic  -develop strategies for thought distraction when ruminating  -TIPP skills  -chair yoga homework assignments  self-care skills    -has used ice packs and box breathing marked symptom improvement   Self esteem, interpersonal relationships   report feeling more positive about self   -accept guidance and help from others  -work on doing 1 music related activity per week   building on strengths  psychotherapy marked symptom improvement      Health journey and self care   -goal of losing 10 lbs for hip replacement surgery    -work on movement and diet   -started weight loss medication  -enrolled in nutrition course Improved functional status        Date of most recent treatment plan: 10/17/24  Date next treatment plan due: 3 months    Patient agreed with the above treatment plan on 10/17/24. Updates on interventions added 11/14.  Discussed case with supervisor who also agreed with the treatment plan. Unable to sign in person due to visit being conducted through telehealth.     Name of provider: RACHEAL REESE MD  Name of supervisor (if learner): Jourdan Marquis and Dr. Torres  Patient care discussed in supervision with above supervisor, who will review and sign note.

## 2024-11-14 NOTE — TELEPHONE ENCOUNTER
PRIOR AUTHORIZATION DENIED    Medication: Wegovy    Denial Date: 11/14/2024    Denial Rational:  Per insurance, medication is excluded from patient's benefit plan and will not be covered. Review and appeal are not available because of this exclusion.                  Appeal Information:  N/A

## 2024-11-25 DIAGNOSIS — E66.9 OBESITY, UNSPECIFIED CLASS, UNSPECIFIED OBESITY TYPE, UNSPECIFIED WHETHER SERIOUS COMORBIDITY PRESENT: ICD-10-CM

## 2024-12-05 ENCOUNTER — VIRTUAL VISIT (OUTPATIENT)
Dept: PSYCHIATRY | Facility: CLINIC | Age: 54
End: 2024-12-05
Attending: PSYCHOLOGIST
Payer: COMMERCIAL

## 2024-12-05 ENCOUNTER — VIRTUAL VISIT (OUTPATIENT)
Dept: PHARMACY | Facility: CLINIC | Age: 54
End: 2024-12-05
Payer: COMMERCIAL

## 2024-12-05 DIAGNOSIS — E66.9 OBESITY, UNSPECIFIED CLASS, UNSPECIFIED OBESITY TYPE, UNSPECIFIED WHETHER SERIOUS COMORBIDITY PRESENT: Primary | ICD-10-CM

## 2024-12-05 DIAGNOSIS — F41.1 GAD (GENERALIZED ANXIETY DISORDER): Primary | ICD-10-CM

## 2024-12-05 DIAGNOSIS — F33.1 MDD (MAJOR DEPRESSIVE DISORDER), RECURRENT EPISODE, MODERATE (H): ICD-10-CM

## 2024-12-05 PROCEDURE — 90837 PSYTX W PT 60 MINUTES: CPT | Mod: 95

## 2024-12-05 NOTE — PATIENT INSTRUCTIONS
"Recommendations from today's MTM visit:                                                         Start 0.5 mg of semaglutide once a week    Follow-up: Return in about 1 month (around 1/7/2025).    It was great speaking with you today.  I value your experience and would be very thankful for your time in providing feedback in our clinic survey. In the next few days, you may receive an email or text message from [facility] with a link to a survey related to your  clinical pharmacist.\"     To schedule another MTM appointment, please call the clinic directly or you may call the MTM scheduling line at [phone] or toll-free at [phone].     My Clinical Pharmacist's contact information:                                                      Please feel free to contact me with any questions or concerns you have.      Jose De Jesus Howard, PharmD   "

## 2024-12-05 NOTE — PROGRESS NOTES
"Medication Therapy Management (MTM) Encounter    ASSESSMENT:                            Medication Adherence/Access: No issues identified.    Weight Management:   Continuing to lose weight. Can consider increasing semaglutide to 0.5 mg for now and 1 mg in the future.     PLAN:                            Start 0.5 mg of semaglutide once a week    Follow-up: Return in about 1 month (around 1/7/2025).    SUBJECTIVE/OBJECTIVE:                          Janice Schulte is a 54 year old female seen for a follow-up visit.       Reason for visit: Medication Therapy Management.    Allergies/ADRs: Reviewed in chart  Past Medical History: Reviewed in chart  Tobacco: She reports that she has been smoking cigarettes. She has a 30 pack-year smoking history. She has never used smokeless tobacco.Nicotine/Tobacco Cessation Plan  Didn't discuss    Alcohol: Didn't discuss    Medication Adherence/Access: no issues reported.    Weight Management   -Semaglutide 0.25 mg once a week  Patient reports no current medication side effects.  Nutrition/Eating Habits: Not much of a snacker.    Exercise/Activity: Is limited due to hip.   Medications Tried/Failed:  None.     Initial Consult Weight: 237 lbs Goal is 225 lbs by February and ideal weight is 170 lbs      Down to 215 pounds - has met goal weight on 12/5/2024       Current weight today: 0 lbs 0 oz  Cumulative Weight Loss: -20 lb    Wt Readings from Last 4 Encounters:   10/28/24 236 lb (107 kg)   06/25/24 234 lb 9.6 oz (106.4 kg)   06/06/24 232 lb (105.2 kg)   12/26/23 236 lb (107 kg)     Estimated body mass index is 40.83 kg/m  as calculated from the following:    Height as of 10/31/24: 5' 3.75\" (1.619 m).    Weight as of 10/31/24: 236 lb (107 kg).            Today's Vitals: There were no vitals taken for this visit.  ----------------      I spent 8 minutes with this patient today. All changes were made via collaborative practice agreement with [facility]. A copy of the " visit note was provided to the patient's provider(s).    A summary of these recommendations was sent via [employer].    Jose De Jesus Howard PharmD    Telemedicine Visit Details  The patient's medications can be safely assessed via a telemedicine encounter.  Type of service:  Telephone visit  Originating Location (pt. Location): Home    Distant Location (provider location):  On-site  Start Time:  1:00 PM  End Time: 1:08 PM     Medication Therapy Recommendations  Obesity, unspecified class, unspecified obesity type, unspecified whether serious comorbidity present   1 Current Medication: Semaglutide-Weight Management (WEGOVY) 0.25 MG/0.5ML pen   Current Medication Sig: Inject 0.25 mg subcutaneously once a week.   Rationale: Dose too low - Dosage too low - Effectiveness   Recommendation: Increase Dose   Status: Accepted per CPA   Identified Date: 12/5/2024 Completed Date: 12/5/2024

## 2024-12-05 NOTE — Clinical Note
Met with Janice today. Increased her semaglutide to 0.5 mg. She has lost 20 pounds in 1 month and is at goal for her surgery in Februrary

## 2024-12-06 NOTE — PROGRESS NOTES
[facility] Outpatient Psychiatry Clinic    OUTPATIENT PSYCHOTHERAPY PROGRESS NOTE    Patient: Cheyenne Schulte (1970), MRN: [id number]    Diagnosis:   No diagnosis found.       Provider: RACHEAL RESEE MD  Date of Service:  12/05/24    Service: 71881 - Psychotherapy (with patient) - 53+ minutes  Start Time: 4:00  End Time: 5:01  In- Person at the [facility]: No    Extended Session (60+ minutes): No  Interactive Complexity: No  Crisis: No    Video Visit Details:   Telemedicine Visit: The patient's condition can be safely assessed and treated via synchronous audio and visual telemedicine encounter.  Reason for Video Visit: Patient has requested telehealth visit  Originating location (patient location):  [facility]   Location- Patient's home  Distant Site (provider location): HIPAA compliant location - Provider Remote Setting- Home Office  Platform used for video visit:  Secure real time interactive audio and visual telecommunication system via [employer]    Individuals Present:   Client attended alone    Medication utilization: No changes to current psychiatric medication(s) ; managed by different provider      Subjective:  Today Janice provided update on  depression and anxiety symptoms which have drastically improved.  Is planning on switching medications to zoloft soon which she is a little nervous about. She has been functioning well at work and in social settings. Has plan to spend more time with brother, especially so he can get to know her partner. She is also looking forward to attending concert this month with her children.    She is continuing to work on strategies to reach goal of hip surgery in the next few months. She has had success losing 15 lbs already with some diet modification and starting medication. This exceeds her previous goal of 10 lbs which was necessary for pre op approval. Plan made to continue working on self esteem building  activities such as playing [employer], self affirmation, spa day. Will think about cutting down on cigarettes.          Treatment interventions:    Psychotherapy Interventions - Supportive : Active listening, Encouragement, praise, ego support, Identification of strengths, Skill building, Validation, and Goal-setting    Psychotherapy Interventions - Motivational Interviewing : Assess stage of change  and Promote hope and positive expectations      Psychotherapy Interventions - Psychodynamic : Enhance insight via self-examination    Mental Status Exam    General: alert  and oriented adequately groomed  Behavior/Demeanor: cooperative and pleasant, with good  eye contact   Speech: regular rate and rhythm Intact. Normal volume, tory. No prosody.  Psychomotor: normal or unremarkable  Mood:  good  Affect: appropriate, bright ; congruent to mood and congruent to content  Thought Process/Associations: unremarkable  Thought Content:  Reports none;  no evidence of suicidal or violent ideation  Insight: good  Judgment: good    Assessment and Progress:     Behavioral Observations: Janice has had difficulty navigating health system and dealt with some changes in timeline for hip replacement. Has challenges with poor self esteem which impact her sense of self and her relationships with loved ones. Emotional regulation has been better over the last few weeks. She has been utilizing TIPP skills and continues to work on self esteem building exercises.    Assessment: Janice is making adequate progress towards treatment goals. No current safety concerns.      Plan:     Next therapy appointment has been scheduled for next week to continue work on treatment goals.    For homework, patient agreed to engage in more music related activities, self care by spending more time on grooming and self affirmation for esteem building. She will also continue to implement TIPP skills for anxiety. Goal to challenge core beliefs that she is not worthy of  good things or that she will fail.    Treatment Plan          SYMPTOMS; PROBLEMS   MEASURABLE GOALS;     INTERVENTIONS;   GAINS MADE DISCHARGE CRITERIA   Emotional regulation and anxiety   -journal/document triggers for anxiety  -reflect on patterns which lead to overwhelm and/or panic  -develop strategies for thought distraction when ruminating  -TIPP skills  -chair yoga homework assignments  self-care skills    -has used ice packs and box breathing marked symptom improvement   Self esteem, interpersonal relationships   report feeling more positive about self   -accept guidance and help from others  -work on doing 1 music related activity per week   building on strengths  psychotherapy marked symptom improvement      Health journey and self care   -goal of losing 10 lbs for hip replacement surgery    -work on movement and diet    -consider cutting down on cigarettes   -started weight loss medication; exceeded goal by losing 15 lbs  -meal prepping, portion control Improved functional status        Date of most recent treatment plan: 10/17/24  Date next treatment plan due: 3 months    Patient agreed with the above treatment plan on 10/17/24. Updates on interventions added 11/14.  Discussed case with supervisor who also agreed with the treatment plan. Unable to sign in person due to visit being conducted through telehealth.     Name of provider: RACHEAL REESE MD  Name of supervisor (if learner): Jourdan Marquis and Dr. Torres  Patient care discussed in supervision with above supervisor, who will review and sign note.

## 2024-12-09 NOTE — PROGRESS NOTES
"Chief Complaint - Hearing loss, tinnitus    History of Present Illness - Cheyenne Schulte is a 54 year old female who presents to me today with hearing loss.  It has been present and noticeable for approximately years. She has tinnitus. Gradual onset and worsening of tinnitus and hearing loss. The patient has noticed increased difficulty hearing certain sounds and difficulty in understanding others, saying \"what\" a lot, especially with background noise.  There is a past h/o chronic ear disease and ear tubes when she was a child. Nothing as an adult.  With regards to recreational, , and work-related noise exposure she has a loud playing and listening to loud music. + family history of hearing loss at a young age. No ear pain. Flying bothers her ears - causes pain.     Tests personally reviewed today for this visit:   1.) audiogram was performed 8/2/24 as part of the evaluation and personally reviewed. The audiogram showed a significant mixed hearing loss.  Right ear is worse.  2.) tympanograms were performed 8/2/24 and were normal Type A curves, with normal canal volume and middle ear pressure.     Past Medical History -   Patient Active Problem List   Diagnosis    Intermittent asthma    Cervical high risk HPV (human papillomavirus) test positive    Papanicolaou smear of cervix with low grade squamous intraepithelial lesion (LGSIL)       Current Medications -   Current Outpatient Medications:     albuterol (PROAIR HFA/PROVENTIL HFA/VENTOLIN HFA) 108 (90 Base) MCG/ACT inhaler, Inhale 2 puffs into the lungs every 6 hours, Disp: 18 g, Rfl: 3    busPIRone HCl (BUSPAR) 30 MG tablet, Take 1 tablet (30 mg) by mouth 2 times daily., Disp: 60 tablet, Rfl: 2    COMPOUNDED NON-CONTROLLED SUBSTANCE (CMPD RX) - PHARMACY TO MIX COMPOUNDED MEDICATION, Compounded semaglutide 0.5 mg subcutaneous injection once weekly, Disp: 4 each, Rfl: 0    gabapentin (NEURONTIN) 100 MG capsule, Take 1-3 capsules (100-300 mg) by mouth nightly " "as needed for other (sleep/anxiety)., Disp: 60 capsule, Rfl: 0    nicotine (NICODERM CQ) 14 MG/24HR 24 hr patch, Place 1 patch onto the skin every 24 hours, Disp: , Rfl:     nicotine (NICODERM CQ) 21 MG/24HR 24 hr patch, Place 1 patch onto the skin every 24 hours, Disp: , Rfl:     nicotine (NICODERM CQ) 7 MG/24HR 24 hr patch, Place 1 patch onto the skin every 24 hours, Disp: , Rfl:     nicotine polacrilex (NICORETTE) 4 MG gum, How to take it: When the urge to smoke occurs, chew gum until you feel a tingle, then \"park\" the gum in your cheek until the tingle is gone. Re-chew every few minutes and \"park\" again, chewing one piece for 30 minutes. Do not eat or drink while chewing. Follow this schedule: Weeks 1 to 6: One piece of gum every 1 to 2 hours. Use at least 9 pieces a day, but no more than 24. Weeks 7 to 9: One piece of gum every 2 to 4 hours. Weeks 10 to 12: One piece of gum every 4 to 8 hours., Disp: , Rfl:     Semaglutide-Weight Management (WEGOVY) 0.25 MG/0.5ML pen, Inject 0.25 mg subcutaneously once a week., Disp: 2 mL, Rfl: 0    venlafaxine (EFFEXOR XR) 150 MG 24 hr capsule, Take 1 capsule (150 mg) by mouth daily. Take along with a 75mg capsule for a total daily dose of 225mg, Disp: 30 capsule, Rfl: 2    venlafaxine (EFFEXOR XR) 75 MG 24 hr capsule, Take 1 capsule (75 mg) by mouth daily. Take along with with a 150mg capsule for a total daily dose of 225mg, Disp: 30 capsule, Rfl: 2    Allergies -   Allergies   Allergen Reactions    Theophylline Cr     Zyban [Bupropion Hydrobromide]     Bupropion Rash       Social History -   Social History     Socioeconomic History    Marital status:    Tobacco Use    Smoking status: Every Day     Current packs/day: 1.00     Average packs/day: 1 pack/day for 30.0 years (30.0 ttl pk-yrs)     Types: Cigarettes    Smokeless tobacco: Never    Tobacco comments:     I ve tried quitting numerous times. Only thing that ever worked was pregnancy.   Vaping Use    Vaping status: " Never Used   Substance and Sexual Activity    Alcohol use: Yes     Alcohol/week: 4.0 standard drinks of alcohol     Types: 4 Cans of beer per week     Comment: 4 drinks a week    Drug use: Yes     Types: Marijuana     Comment: Helps my back pain    Sexual activity: Yes     Partners: Male     Birth control/protection: Injection     Comment: I would like to get on birth control if possible.   Other Topics Concern    Parent/sibling w/ CABG, MI or angioplasty before 65F 55M? No     Social Drivers of Health     Financial Resource Strain: Low Risk  (6/3/2024)    Financial Resource Strain     Within the past 12 months, have you or your family members you live with been unable to get utilities (heat, electricity) when it was really needed?: No   Food Insecurity: Low Risk  (6/3/2024)    Food Insecurity     Within the past 12 months, did you worry that your food would run out before you got money to buy more?: No     Within the past 12 months, did the food you bought just not last and you didn t have money to get more?: No   Transportation Needs: Low Risk  (6/3/2024)    Transportation Needs     Within the past 12 months, has lack of transportation kept you from medical appointments, getting your medicines, non-medical meetings or appointments, work, or from getting things that you need?: No   Physical Activity: Inactive (6/3/2024)    Exercise Vital Sign     Days of Exercise per Week: 0 days     Minutes of Exercise per Session: 20 min   Stress: Stress Concern Present (6/3/2024)    [employer] [place] [employer] - Occupational Stress Questionnaire     Feeling of Stress : To some extent   Social Connections: Unknown (6/3/2024)    Social Connection and Isolation Panel [NHANES]     Frequency of Social Gatherings with Friends and Family: More than three times a week   Interpersonal Safety: Low Risk  (10/28/2024)    Interpersonal Safety     Do you feel physically and emotionally safe where you currently live?: Yes      Within the past 12 months, have you been hit, slapped, kicked or otherwise physically hurt by someone?: No     Within the past 12 months, have you been humiliated or emotionally abused in other ways by your partner or ex-partner?: No   Housing Stability: Low Risk  (6/3/2024)    Housing Stability     Do you have housing? : Yes     Are you worried about losing your housing?: No       Family History -   Family History   Problem Relation Age of Onset    Arthritis Mother     Colon Cancer Mother     C.A.D. Father     Diabetes Father     Alcohol/Drug Father     Heart Disease Father     Coronary Artery Disease Father     Substance Abuse Father     Obesity Father        Review of Systems - As per HPI and PMHx, otherwise 7 system review is negative.    Physical Exam  General - The patient is in no distress.  Alert, answers questions and cooperates with examination appropriately.   Voice and Breathing - The patient was breathing comfortably without the use of accessory muscles. There was no wheezing, stridor, or stertor.  The patients voice was clear and strong.  Ears - The auricles are normal. The tympanic membranes are normal in appearance, bony landmarks are intact. some retraction, but no perforation, or masses. She can pop ears. No fluid or purulence was seen in the external canal or the middle ear. No evidence of infection of the middle ear or external canal, cerumen was normal in appearance.  Eyes - Extraocular movements intact.  Sclera were not icteric or injected.  Neck - Soft, non-tender. Palpation of the occipital, submental, submandibular, internal jugular chain, and supraclavicular nodes did not demonstrate any abnormal lymph nodes or masses. Parotid glands had no masses. Palpation of the thyroid was soft and smooth, with no nodules or goiter appreciated.  The trachea was mobile and midline.  Neurological - Cranial nerves 2 through 12 were grossly intact. House-Brackmann grade 1 out of 6 bilaterally.        Assessment and Plan -     ICD-10-CM    1. Mixed hearing loss, bilateral  H90.6       2. Dysfunction of both eustachian tubes  H69.93       3. Tinnitus, bilateral  H93.13           Cheyenne CAYDEN Schulte is a 54 year old female who presents to me today with mixed hearing loss, mostly sensorineural hearing loss. Right ear worse some.  This is most consistent with presbycusis and noise exposure, but some ETD. She can pop ears. Continue this regularly. I can find no evidence of serious CNS disorders or other complicating factors that could be causing this.  We spent the remainder of today's visit on education. We discussed hearing protection in noisy environments.    The patient will follow up as necessary for worsening symptoms or changes in symptoms. I have also recommended yearly audiograms. I recommend a hearing aid consultation and hearing aids. We discussed tinnitus too. She can try masking, limit stress, anxiety, and depression.    Scotty Moss MD  Otolaryngology  [facility]

## 2024-12-11 ENCOUNTER — OFFICE VISIT (OUTPATIENT)
Dept: OTOLARYNGOLOGY | Facility: CLINIC | Age: 54
End: 2024-12-11
Payer: COMMERCIAL

## 2024-12-11 VITALS
DIASTOLIC BLOOD PRESSURE: 89 MMHG | RESPIRATION RATE: 18 BRPM | HEART RATE: 109 BPM | OXYGEN SATURATION: 98 % | SYSTOLIC BLOOD PRESSURE: 139 MMHG

## 2024-12-11 DIAGNOSIS — H69.93 DYSFUNCTION OF BOTH EUSTACHIAN TUBES: ICD-10-CM

## 2024-12-11 DIAGNOSIS — H93.13 TINNITUS, BILATERAL: ICD-10-CM

## 2024-12-11 DIAGNOSIS — H90.6 MIXED HEARING LOSS, BILATERAL: Primary | ICD-10-CM

## 2024-12-11 PROCEDURE — 99203 OFFICE O/P NEW LOW 30 MIN: CPT | Performed by: OTOLARYNGOLOGY

## 2024-12-11 NOTE — LETTER
"12/11/2024      Cheyenne Schulte  [address]  [place] [address]      Dear Colleague,    [facility] you [facility] your patient, [facility]enne Schulte[facility] Please see a copy of my visit note below.    Chief Complaint - Hearing loss, tinnitus    History of Present Illness - Cheyenne Schulte is a 54 year old female who presents to me today with hearing loss.  It has been present and noticeable for approximately years. She has tinnitus. Gradual onset and worsening of tinnitus and hearing loss. The patient has noticed increased difficulty hearing certain sounds and difficulty in understanding others, saying \"what\" a lot, especially with background noise.  There is a past h/o chronic ear disease and ear tubes when she was a child. Nothing as an adult.  With regards to recreational, , and work-related noise exposure she has a loud playing and listening to loud music. + family history of hearing loss at a young age. No ear pain. Flying bothers her ears - causes pain.     Tests personally reviewed today for this visit:   1.) audiogram was performed 8/2/24 as part of the evaluation and personally reviewed. The audiogram showed a significant mixed hearing loss.  Right ear is worse.  2.) tympanograms were performed 8/2/24 and were normal Type A curves, with normal canal volume and middle ear pressure.     Past Medical History -   Patient Active Problem List   Diagnosis     Intermittent asthma     Cervical high risk HPV (human papillomavirus) test positive     Papanicolaou smear of cervix with low grade squamous intraepithelial lesion (LGSIL)       Current Medications -   Current Outpatient Medications:      albuterol (PROAIR HFA/PROVENTIL HFA/VENTOLIN HFA) 108 (90 Base) MCG/ACT inhaler, Inhale 2 puffs into the lungs every 6 hours, Disp: 18 g, Rfl: 3     busPIRone HCl (BUSPAR) 30 MG tablet, Take 1 tablet (30 mg) by mouth 2 times daily., Disp: 60 tablet, Rfl: 2     COMPOUNDED " "NON-CONTROLLED SUBSTANCE (CMPD RX) - PHARMACY TO MIX COMPOUNDED MEDICATION, Compounded semaglutide 0.5 mg subcutaneous injection once weekly, Disp: 4 each, Rfl: 0     gabapentin (NEURONTIN) 100 MG capsule, Take 1-3 capsules (100-300 mg) by mouth nightly as needed for other (sleep/anxiety)., Disp: 60 capsule, Rfl: 0     nicotine (NICODERM CQ) 14 MG/24HR 24 hr patch, Place 1 patch onto the skin every 24 hours, Disp: , Rfl:      nicotine (NICODERM CQ) 21 MG/24HR 24 hr patch, Place 1 patch onto the skin every 24 hours, Disp: , Rfl:      nicotine (NICODERM CQ) 7 MG/24HR 24 hr patch, Place 1 patch onto the skin every 24 hours, Disp: , Rfl:      nicotine polacrilex (NICORETTE) 4 MG gum, How to take it: When the urge to smoke occurs, chew gum until you feel a tingle, then \"park\" the gum in your cheek until the tingle is gone. Re-chew every few minutes and \"park\" again, chewing one piece for 30 minutes. Do not eat or drink while chewing. Follow this schedule: Weeks 1 to 6: One piece of gum every 1 to 2 hours. Use at least 9 pieces a day, but no more than 24. Weeks 7 to 9: One piece of gum every 2 to 4 hours. Weeks 10 to 12: One piece of gum every 4 to 8 hours., Disp: , Rfl:      Semaglutide-Weight Management (WEGOVY) 0.25 MG/0.5ML pen, Inject 0.25 mg subcutaneously once a week., Disp: 2 mL, Rfl: 0     venlafaxine (EFFEXOR XR) 150 MG 24 hr capsule, Take 1 capsule (150 mg) by mouth daily. Take along with a 75mg capsule for a total daily dose of 225mg, Disp: 30 capsule, Rfl: 2     venlafaxine (EFFEXOR XR) 75 MG 24 hr capsule, Take 1 capsule (75 mg) by mouth daily. Take along with with a 150mg capsule for a total daily dose of 225mg, Disp: 30 capsule, Rfl: 2    Allergies -   Allergies   Allergen Reactions     Theophylline Cr      Zyban [Bupropion Hydrobromide]      Bupropion Rash       Social History -   Social History     Socioeconomic History     Marital status:    Tobacco Use     Smoking status: Every Day     Current " packs/day: 1.00     Average packs/day: 1 pack/day for 30.0 years (30.0 ttl pk-yrs)     Types: Cigarettes     Smokeless tobacco: Never     Tobacco comments:     I ve tried quitting numerous times. Only thing that ever worked was pregnancy.   Vaping Use     Vaping status: Never Used   Substance and Sexual Activity     Alcohol use: Yes     Alcohol/week: 4.0 standard drinks of alcohol     Types: 4 Cans of beer per week     Comment: 4 drinks a week     Drug use: Yes     Types: Marijuana     Comment: Helps my back pain     Sexual activity: Yes     Partners: Male     Birth control/protection: Injection     Comment: I would like to get on birth control if possible.   Other Topics Concern     Parent/sibling w/ CABG, MI or angioplasty before 65F 55M? No     Social Drivers of Health     Financial Resource Strain: Low Risk  (6/3/2024)    Financial Resource Strain      Within the past 12 months, have you or your family members you live with been unable to get utilities (heat, electricity) when it was really needed?: No   Food Insecurity: Low Risk  (6/3/2024)    Food Insecurity      Within the past 12 months, did you worry that your food would run out before you got money to buy more?: No      Within the past 12 months, did the food you bought just not last and you didn t have money to get more?: No   Transportation Needs: Low Risk  (6/3/2024)    Transportation Needs      Within the past 12 months, has lack of transportation kept you from medical appointments, getting your medicines, non-medical meetings or appointments, work, or from getting things that you need?: No   Physical Activity: Inactive (6/3/2024)    Exercise Vital Sign      Days of Exercise per Week: 0 days      Minutes of Exercise per Session: 20 min   Stress: Stress Concern Present (6/3/2024)    [place] [employer] - Occupational Stress Questionnaire      Feeling of Stress : To some extent   Social Connections: Unknown (6/3/2024)    Social  Connection and Isolation Panel [NHANES]      Frequency of Social Gatherings with Friends and Family: More than three times a week   Interpersonal Safety: Low Risk  (10/28/2024)    Interpersonal Safety      Do you feel physically and emotionally safe where you currently live?: Yes      Within the past 12 months, have you been hit, slapped, kicked or otherwise physically hurt by someone?: No      Within the past 12 months, have you been humiliated or emotionally abused in other ways by your partner or ex-partner?: No   Housing Stability: Low Risk  (6/3/2024)    Housing Stability      Do you have housing? : Yes      Are you worried about losing your housing?: No       Family History -   Family History   Problem Relation Age of Onset     Arthritis Mother      Colon Cancer Mother      C.A.D. Father      Diabetes Father      Alcohol/Drug Father      Heart Disease Father      Coronary Artery Disease Father      Substance Abuse Father      Obesity Father        Review of Systems - As per HPI and PMHx, otherwise 7 system review is negative.    Physical Exam  General - The patient is in no distress.  Alert, answers questions and cooperates with examination appropriately.   Voice and Breathing - The patient was breathing comfortably without the use of accessory muscles. There was no wheezing, stridor, or stertor.  The patients voice was clear and strong.  Ears - The auricles are normal. The tympanic membranes are normal in appearance, bony landmarks are intact. some retraction, but no perforation, or masses. She can pop ears. No fluid or purulence was seen in the external canal or the middle ear. No evidence of infection of the middle ear or external canal, cerumen was normal in appearance.  Eyes - Extraocular movements intact.  Sclera were not icteric or injected.  Neck - Soft, non-tender. Palpation of the occipital, submental, submandibular, internal jugular chain, and supraclavicular nodes did not demonstrate any abnormal  lymph nodes or masses. Parotid glands had no masses. Palpation of the thyroid was soft and smooth, with no nodules or goiter appreciated.  The trachea was mobile and midline.  Neurological - Cranial nerves 2 through 12 were grossly intact. House-Brackmann grade 1 out of 6 bilaterally.       Assessment and Plan -     ICD-10-CM    1. Mixed hearing loss, bilateral  H90.6       2. Dysfunction of both eustachian tubes  H69.93       3. Tinnitus, bilateral  H93.13           Cheyenne CAYDEN Schulte is a 54 year old female who presents to me today with mixed hearing loss, mostly sensorineural hearing loss. Right ear worse some.  This is most consistent with presbycusis and noise exposure, but some ETD. She can pop ears. Continue this regularly. I can find no evidence of serious CNS disorders or other complicating factors that could be causing this.  We spent the remainder of today's visit on education. We discussed hearing protection in noisy environments.    The patient will follow up as necessary for worsening symptoms or changes in symptoms. I have also recommended yearly audiograms. I recommend a hearing aid consultation and hearing aids. We discussed tinnitus too. She can try masking, limit stress, anxiety, and depression.    Scotty Moss MD  Otolaryngology  [facility]        Again, thank you for allowing me to participate in the care of your patient.        Sincerely,        Scotty Moss MD

## 2024-12-12 ENCOUNTER — VIRTUAL VISIT (OUTPATIENT)
Dept: PSYCHIATRY | Facility: CLINIC | Age: 54
End: 2024-12-12
Attending: PSYCHOLOGIST
Payer: COMMERCIAL

## 2024-12-12 DIAGNOSIS — F33.1 MDD (MAJOR DEPRESSIVE DISORDER), RECURRENT EPISODE, MODERATE (H): ICD-10-CM

## 2024-12-12 DIAGNOSIS — F41.1 GAD (GENERALIZED ANXIETY DISORDER): Primary | ICD-10-CM

## 2024-12-12 PROCEDURE — 90837 PSYTX W PT 60 MINUTES: CPT | Mod: 95

## 2024-12-12 NOTE — PROGRESS NOTES
[facility]    OUTPATIENT PSYCHOTHERAPY PROGRESS NOTE    Patient: Cheyenne Schulte (1970), MRN: [id number]    Diagnosis:   1. SERGIO (generalized anxiety disorder)    2. MDD (major depressive disorder), recurrent episode, moderate (H)           Provider: RACHEAL REESE MD  Date of Service:  12/12/24    Service: 32867 - Psychotherapy (with patient) - 53+ minutes  Start Time: 4:00  End Time: 5:03  In- Person at the [facility]: No    Extended Session (60+ minutes): No  Interactive Complexity: No  Crisis: No    Video Visit Details:   Telemedicine Visit: The patient's condition can be safely assessed and treated via synchronous audio and visual telemedicine encounter.  Reason for Video Visit: Patient has requested telehealth visit  Originating location (patient location):  [facility]   Location- Patient's home  Distant Site (provider location): HIPAA compliant location - Provider Remote Setting- Home Office  Platform used for video visit:  Secure real time interactive audio and visual telecommunication system via Zzish    Individuals Present:   Client attended alone    Medication utilization: No changes to current psychiatric medication(s) ; managed by different provider      Subjective:  Today Janice provided update on  depression and anxiety symptoms which have continued to be well managed. Recently started low dose gabapentin which has been helpful for pain and sleep. She has been functioning well at work and in social settings. She is looking forward to attending concerts this month with her children and partner.    She is continuing to work on strategies to optimize health for hip surgery which is scheduled for February. She has had success losing around 20 lbs already with some diet modification and starting medication. This exceeds her previous goal of 10 lbs which was necessary for pre op approval. Plan made to continue  working on self esteem building activities. Has goal to get new glasses and get her car serviced this next week.           Treatment interventions:    Psychotherapy Interventions - Supportive : Active listening, Encouragement, praise, ego support, Identification of strengths, Skill building, Validation, and Goal-setting    Psychotherapy Interventions - Motivational Interviewing : Assess stage of change  and Promote hope and positive expectations      Psychotherapy Interventions - Psychodynamic : Enhance insight via self-examination    Mental Status Exam    General: alert  and oriented adequately groomed  Behavior/Demeanor: cooperative and pleasant, with good  eye contact   Speech: regular rate and rhythm Intact. Normal volume, tory. No prosody.  Psychomotor: normal or unremarkable  Mood:  good  Affect: appropriate, bright ; congruent to mood and congruent to content  Thought Process/Associations: unremarkable  Thought Content:  Reports none;  no evidence of suicidal or violent ideation  Insight: good  Judgment: good    Assessment and Progress:     Behavioral Observations: Janice has had difficulty navigating health system and dealt with some changes in timeline for hip replacement. Has challenges with poor self esteem which impact her sense of self and her relationships with loved ones. Emotional regulation has been better over the last few weeks. She has been utilizing TIPP skills and continues to work on self esteem building exercises.    Assessment: Janice is making adequate progress towards treatment goals. No current safety concerns.      Plan:     Next therapy appointment has been scheduled for next week to continue work on treatment goals.    For homework, patient agreed to engage in more music related activities, self care by spending more time on grooming and self affirmation for esteem building. She has goal to get some errands done as well, including getting new glasses and servicing her  car.    Treatment Plan          SYMPTOMS; PROBLEMS   MEASURABLE GOALS;     INTERVENTIONS;   GAINS MADE DISCHARGE CRITERIA   Emotional regulation and anxiety   -journal/document triggers for anxiety  -reflect on patterns which lead to overwhelm and/or panic  -develop strategies for thought distraction when ruminating  -TIPP skills  -chair yoga homework assignments  self-care skills    -has used ice packs and box breathing marked symptom improvement   Self esteem, interpersonal relationships   report feeling more positive about self   -accept guidance and help from others  -work on doing 1 music related activity per week   building on strengths  psychotherapy marked symptom improvement      Health journey and self care   -goal of losing 10 lbs for hip replacement surgery    -work on movement and diet    -consider cutting down on cigarettes   -started weight loss medication; exceeded goal by losing 20 lbs  -meal prepping, portion control Improved functional status        Date of most recent treatment plan: 10/17/24  Date next treatment plan due: 3 months    Patient agreed with the above treatment plan on 10/17/24. Updates on interventions added 11/14.  Discussed case with supervisor who also agreed with the treatment plan. Unable to sign in person due to visit being conducted through telehealth.     Name of provider: RACHEAL REESE MD  Name of supervisor (if learner): Jourdan Marquis and Dr. Torres  Patient care discussed in supervision with above supervisor, who will review and sign note.

## 2024-12-18 ENCOUNTER — TELEPHONE (OUTPATIENT)
Dept: DERMATOLOGY | Facility: CLINIC | Age: 54
End: 2024-12-18
Payer: COMMERCIAL

## 2024-12-18 NOTE — TELEPHONE ENCOUNTER
"Received fax request from [facility] [employer] requesting clarification for the following:    Direction not clear- please define \"HS\"    Clindamycin 1% lotion- apply topically every morning and to all area of \"HS\" twice daily with flares.    Please advise-    Dionisio NGUYỄN        "

## 2024-12-18 NOTE — TELEPHONE ENCOUNTER
Spoke with pharmacist- defined HS, they want to know if pt is to apply every morning which means once a day, or twice daily? Is it twice daily when flared only? And once a day when not flared? Please clarify frequency     Thank you  Tish ALDRICH RN BSN  [facility]  [phone]

## 2024-12-19 ENCOUNTER — VIRTUAL VISIT (OUTPATIENT)
Dept: PSYCHIATRY | Facility: CLINIC | Age: 54
End: 2024-12-19
Attending: PSYCHOLOGIST
Payer: COMMERCIAL

## 2024-12-19 DIAGNOSIS — F41.1 GAD (GENERALIZED ANXIETY DISORDER): ICD-10-CM

## 2024-12-19 DIAGNOSIS — F33.1 MDD (MAJOR DEPRESSIVE DISORDER), RECURRENT EPISODE, MODERATE (H): Primary | ICD-10-CM

## 2024-12-19 NOTE — TELEPHONE ENCOUNTER
Spoke with pharmacist and gave clarification below. She expressed understanding.     Tish ALDRICH RN BSN  [facility]  [phone]

## 2024-12-19 NOTE — PROGRESS NOTES
[facility]    OUTPATIENT PSYCHOTHERAPY PROGRESS NOTE    Patient: Cheyenne Schulte (1970), MRN: [id number]    Diagnosis:   1. MDD (major depressive disorder), recurrent episode, moderate (H)    2. SERGIO (generalized anxiety disorder)             Provider: RACHEAL REESE MD  Date of Service:  12/19/24    Service: 45674 - Psychotherapy (with patient) - 53+ minutes  Start Time: 4:10  End Time: 5:05  In- Person at the [facility]: No    Extended Session (60+ minutes): No  Interactive Complexity: No  Crisis: No    Video Visit Details:   Telemedicine Visit: The patient's condition can be safely assessed and treated via synchronous audio and visual telemedicine encounter.  Reason for Video Visit: Patient has requested telehealth visit  Originating location (patient location):  [facility]   Location- Patient's home  Distant Site (provider location): HIPAA compliant location - Provider Remote Setting- Home Office  Platform used for video visit:  Secure real time interactive audio and visual telecommunication system via [employer]    Individuals Present:   Client attended alone    Medication utilization: No changes to current psychiatric medication(s) ; managed by different provider      Subjective:  Today Janice provided update on  depression and anxiety symptoms which have continued to be well managed. She has been functioning well at work and in social settings. Has had some concerns in her friend group but is keeping healthy boundaries while also being supportive to friends. Her adult child Neo shared that they are transgender this week. She has been intentional with processing this news and focusing on supporting them. She is also looking forward to attending a iva concert with her children and partner.    She is continuing to work on strategies to optimize health for hip surgery which is scheduled for February. She has had  success losing around 20 lbs already with some diet modification and starting medication. This exceeds her previous goal which was necessary for pre op approval. Plan made to continue working on self esteem building activities. Made appointments to get new glasses and get her car serviced. Plans to spend time with family for the holidays. Goal to start cleaning out her closet.           Treatment interventions:    Psychotherapy Interventions - Supportive : Active listening, Encouragement, praise, ego support, Identification of strengths, Skill building, Validation, and Goal-setting    Psychotherapy Interventions - Motivational Interviewing : Assess stage of change  and Promote hope and positive expectations      Psychotherapy Interventions - Psychodynamic : Enhance insight via self-examination    Mental Status Exam    General: alert  and oriented adequately groomed  Behavior/Demeanor: cooperative and pleasant, with good  eye contact   Speech: regular rate and rhythm Intact. Normal volume, tory. No prosody.  Psychomotor: normal or unremarkable  Mood:  good  Affect: appropriate, bright ; congruent to mood and congruent to content  Thought Process/Associations: unremarkable  Thought Content:  Reports none;  no evidence of suicidal or violent ideation  Insight: good  Judgment: good    Assessment and Progress:     Behavioral Observations: Janice has had difficulty navigating health system and dealt with some changes in timeline for hip replacement. Has challenges with poor self esteem which impact her sense of self and her relationships with loved ones. Emotional regulation has been better over the last few weeks. She has been utilizing emotional regulation skills and continues to work on self esteem building exercises.    Assessment: Janice is making adequate progress towards treatment goals. No current safety concerns.      Plan:     Next therapy appointment has been scheduled in two weeks to continue work on  treatment goals.    For homework, patient agreed to engage in more music related activities, self care for esteem building. She has goal to get some errands done as well, including getting new glasses,servicing her car and cleaning her closet.     Treatment Plan          SYMPTOMS; PROBLEMS   MEASURABLE GOALS;     INTERVENTIONS;   GAINS MADE DISCHARGE CRITERIA   Emotional regulation and anxiety   -journal/document triggers for anxiety  -reflect on patterns which lead to overwhelm and/or panic  -develop strategies for thought distraction when ruminating  -TIPP skills  -chair yoga homework assignments  self-care skills    -has used ice packs and box breathing marked symptom improvement   Self esteem, interpersonal relationships   report feeling more positive about self   -accept guidance and help from others  -work on doing 1 music related activity per week   building on strengths  psychotherapy marked symptom improvement      Health journey and self care   -goal of losing 10 lbs for hip replacement surgery    -work on movement and diet    -consider cutting down on cigarettes   -started weight loss medication; exceeded goal by losing >20 lbs  -meal prepping, portion control Improved functional status        Date of most recent treatment plan: 10/17/24  Date next treatment plan due: 3 months    Patient agreed with the above treatment plan on 10/17/24. Updates on interventions added 11/14.  Discussed case with supervisor who also agreed with the treatment plan. Unable to sign in person due to visit being conducted through telehealth.     Name of provider: RACHEAL REESE MD  Name of supervisor (if learner): Jourdan Marquis and Dr. Torres  Patient care discussed in supervision with above supervisor, who will review and sign note.

## 2025-01-02 ENCOUNTER — VIRTUAL VISIT (OUTPATIENT)
Dept: PSYCHIATRY | Facility: CLINIC | Age: 55
End: 2025-01-02
Attending: PSYCHOLOGIST
Payer: COMMERCIAL

## 2025-01-02 DIAGNOSIS — F41.1 GAD (GENERALIZED ANXIETY DISORDER): ICD-10-CM

## 2025-01-02 DIAGNOSIS — F33.1 MDD (MAJOR DEPRESSIVE DISORDER), RECURRENT EPISODE, MODERATE (H): Primary | ICD-10-CM

## 2025-01-03 NOTE — PROGRESS NOTES
[facility]    OUTPATIENT PSYCHOTHERAPY PROGRESS NOTE    Patient: Cheyenne Schulte (1970), MRN: [id number]    Diagnosis:   1. MDD (major depressive disorder), recurrent episode, moderate (H)    2. SERGIO (generalized anxiety disorder)           Provider: RACHEAL REESE MD  Date of Service:  1/02/25    Service: 52285 - Psychotherapy (with patient) - 53+ minutes  Start Time: 4:01  End Time: 5:00  In- Person at the [facility]: No    Extended Session (60+ minutes): No  Interactive Complexity: No  Crisis: No    Video Visit Details:   Telemedicine Visit: The patient's condition can be safely assessed and treated via synchronous audio and visual telemedicine encounter.  Reason for Video Visit: Patient has requested telehealth visit  Originating location (patient location):  [facility]   Location- Patient's home  Distant Site (provider location): HIPAA compliant location - Provider Remote Setting- Home Office  Platform used for video visit:  Secure real time interactive audio and visual telecommunication system via [employer]    Individuals Present:   Client attended alone    Medication utilization: No changes to current psychiatric medication(s) ; managed by different provider      Subjective:  Today Janice provided update on  depression and anxiety symptoms which have continued to be well managed. She has been functioning well at work and in social settings. Enjoyed iva and new years with her family. Has been feeling more fatigued, feels this has to do with weight loss medication side effect as well as interrupted sleep due to hip pain. Had a bad experience at eye doctor where she had spike in anxiety. Was able to utilize breathing exercises.    She is continuing to work on strategies to optimize health for hip surgery which is scheduled for February. She has had success losing around 30 lbs already with diet modification and  starting medication.  Plan made to continue working on self esteem building activities. Is looking forward to more family time and also becoming more involved in local [facility] organization.          Treatment interventions:    Psychotherapy Interventions - Supportive : Active listening, Encouragement, praise, ego support, Validation, and Goal-setting    Psychotherapy Interventions - Motivational Interviewing : Assess stage of change  and Promote hope and positive expectations      Psychotherapy Interventions - Psychodynamic : Enhance insight via self-examination    Mental Status Exam    General: alert  and oriented adequately groomed  Behavior/Demeanor: cooperative and pleasant, with good  eye contact   Speech: regular rate and rhythm Intact. Normal volume, tory. No prosody.  Psychomotor: normal or unremarkable  Mood:  tired, stable  Affect: appropriate, bright ; congruent to mood and congruent to content  Thought Process/Associations: unremarkable  Thought Content:  Reports none;  no evidence of suicidal or violent ideation  Insight: good  Judgment: good    Assessment and Progress:     Behavioral Observations: Janice has been doing better lately in terms of anxiety and depression symptoms overall. She has been utilizing emotional regulation skills and continues to work on self esteem building exercises.    Assessment: Janice is making adequate progress towards treatment goals. She at times has difficulty with emotional regulation and can experience poor distress tolerance. This has been more stable recently. Has challenges with poor self esteem which impact her sense of self.  No current safety concerns.      Plan:     Next therapy appointment has been scheduled in one week to continue work on treatment goals.    For homework, patient agreed to engage in more music related activities, self care for esteem building. She plans to reflect on ideas for updated treatment plan.    Treatment Plan          SYMPTOMS;  PROBLEMS   MEASURABLE GOALS;     INTERVENTIONS;   GAINS MADE DISCHARGE CRITERIA   Emotional regulation and anxiety   -journal/document triggers for anxiety  -reflect on patterns which lead to overwhelm and/or panic  -develop strategies for thought distraction when ruminating  -TIPP skills  -chair yoga homework assignments  self-care skills    -has used ice packs and box breathing marked symptom improvement   Self esteem, interpersonal relationships   report feeling more positive about self   -accept guidance and help from others  -work on doing 1 music related activity per week   building on strengths  psychotherapy marked symptom improvement      Health journey and self care   -goal of losing 10 lbs for hip replacement surgery    -work on movement and diet    -consider cutting down on cigarettes   -started weight loss medication; exceeded goal by losing >25 lbs  -meal prepping, portion control Improved functional status        Date of most recent treatment plan: 10/17/24  Date next treatment plan due: 3 months    Patient agreed with the above treatment plan on 10/17/24.  Discussed case with supervisor who also agreed with the treatment plan. Unable to sign in person due to visit being conducted through telehealth.     Name of provider: RACHEAL REESE MD  Name of supervisor (if learner): Jourdan Marquis and Dr. Torres  Patient care discussed in supervision with above supervisor, who will review and sign note.

## 2025-01-07 ENCOUNTER — VIRTUAL VISIT (OUTPATIENT)
Dept: PHARMACY | Facility: CLINIC | Age: 55
End: 2025-01-07
Payer: COMMERCIAL

## 2025-01-07 VITALS — BODY MASS INDEX: 36.67 KG/M2 | WEIGHT: 207 LBS

## 2025-01-07 DIAGNOSIS — E66.9 OBESITY, UNSPECIFIED CLASS, UNSPECIFIED OBESITY TYPE, UNSPECIFIED WHETHER SERIOUS COMORBIDITY PRESENT: Primary | ICD-10-CM

## 2025-01-07 PROCEDURE — 99207 PR NO CHARGE LOS: CPT | Mod: 93

## 2025-01-07 NOTE — PATIENT INSTRUCTIONS
"Recommendations from today's MTM visit:                                                         Increase Semaglutide to 1 mg once a week    Follow-up: Return in about 30 days (around 2/6/2025).    It was great speaking with you today.  I value your experience and would be very thankful for your time in providing feedback in our clinic survey. In the next few days, you may receive an email or text message from [employer] with a link to a survey related to your  clinical pharmacist.\"     To schedule another MTM appointment, please call the clinic directly or you may call the MTM scheduling line at [phone] or toll-free at [phone].     My Clinical Pharmacist's contact information:                                                      Please feel free to contact me with any questions or concerns you have.      Jose De Jesus Howard, PharmD   "

## 2025-01-07 NOTE — PROGRESS NOTES
"Medication Therapy Management (MTM) Encounter    ASSESSMENT:                            Medication Adherence/Access: No issues identified.    Weight Management:   Janice has been tolerating 0.5 mg of semaglutide so far. Can consider increasing to 1 mg at next dosage. PharmD instructed to hold Semaglutide 1 week prior to surgery    PLAN:                            Increase Semaglutide to 1 mg once a week    Follow-up: Return in about 30 days (around 2/6/2025).    SUBJECTIVE/OBJECTIVE:                          Janice Schulte is a 54 year old female seen for a follow-up visit.       Reason for visit: Medication Therapy Management.    Allergies/ADRs: Reviewed in chart  Past Medical History: Reviewed in chart  Tobacco: She reports that she has been smoking cigarettes. She has a 30 pack-year smoking history. She has never used smokeless tobacco.Nicotine/Tobacco Cessation Plan  Didn't discuss    Alcohol: not currently using    Medication Adherence/Access: no issues reported.    Weight Management   -Semaglutide 0.5 mg once a week  Patient reports no current medication side effects.  Nutrition/Eating Habits: Not much of a snacker.    Exercise/Activity: Is limited due to hip.   Medications Tried/Failed:  None.     Interested in going up on dosage as she feels it would provide more control.    Initial Consult Weight: 237 lbs Goal is 225 lbs by February and ideal weight is 170 lbs      Down to 215 pounds - has met goal weight on 12/5/2024    207 pounds as of 1/7/2025             Cumulative Weight Loss: -7 lb    Wt Readings from Last 4 Encounters:   01/07/25 207 lb (93.9 kg)   10/28/24 236 lb (107 kg)   06/25/24 234 lb 9.6 oz (106.4 kg)   06/06/24 232 lb (105.2 kg)     Estimated body mass index is 36.67 kg/m  as calculated from the following:    Height as of 12/6/24: 5' 3\" (1.6 m).    Weight as of this encounter: 207 lb (93.9 kg).          Today's Vitals: Wt 207 lb (93.9 kg)   BMI 36.67 kg/m    ----------------      I spent 8 " minutes with this patient today. All changes were made via collaborative practice agreement with [facility].     A summary of these recommendations was sent via [employer].    Jose De Jesus Howard PharmD    Telemedicine Visit Details  The patient's medications can be safely assessed via a telemedicine encounter.  Type of service:  Telephone visit  Originating Location (pt. Location): Home    Distant Location (provider location):  On-site  Start Time:  1:00 PM  End Time: 1:07 PM     Medication Therapy Recommendations  Obesity, unspecified class, unspecified obesity type, unspecified whether serious comorbidity present   1 Current Medication: Semaglutide-Weight Management (WEGOVY) 0.25 MG/0.5ML pen   Current Medication Sig: Inject 0.25 mg subcutaneously once a week.   Rationale: Dose too low - Dosage too low - Effectiveness   Recommendation: Increase Dose   Status: Accepted per CPA   Identified Date: 1/7/2025 Completed Date: 1/7/2025

## 2025-01-09 ENCOUNTER — VIRTUAL VISIT (OUTPATIENT)
Dept: PSYCHIATRY | Facility: CLINIC | Age: 55
End: 2025-01-09
Attending: PSYCHOLOGIST
Payer: COMMERCIAL

## 2025-01-09 DIAGNOSIS — F33.1 MDD (MAJOR DEPRESSIVE DISORDER), RECURRENT EPISODE, MODERATE (H): ICD-10-CM

## 2025-01-09 DIAGNOSIS — F41.1 GAD (GENERALIZED ANXIETY DISORDER): Primary | ICD-10-CM

## 2025-01-09 PROCEDURE — 90832 PSYTX W PT 30 MINUTES: CPT | Mod: 95

## 2025-01-09 NOTE — PROGRESS NOTES
[facility]    OUTPATIENT PSYCHOTHERAPY PROGRESS NOTE    Patient: Cheyenne Schulte (1970), MRN: [id number]    Diagnosis:   1. SERGIO (generalized anxiety disorder)    2. MDD (major depressive disorder), recurrent episode, moderate (H)             Provider: RACHEAL REESE MD  Date of Service:  1/09/25    Service: 48062 - Psychotherapy (with patient) - 16-37 minutes  Start Time: 4:01  End Time: 4:36  In- Person at the [facility]: No    Extended Session (60+ minutes): No  Interactive Complexity: No  Crisis: No    Video Visit Details:   Telemedicine Visit: The patient's condition can be safely assessed and treated via synchronous audio and visual telemedicine encounter.  Reason for Video Visit: Patient has requested telehealth visit  Originating location (patient location):  [facility]   Location- Patient's home  Distant Site (provider location): HIPAA compliant location - Provider Remote Setting- Home Office  Platform used for video visit:  Secure real time interactive audio and visual telecommunication system via [email or web address]    Individuals Present:   Client attended alone    Medication utilization: No changes to current psychiatric medication(s) ; managed by different provider      Subjective:  Today Janice provided update on  depression and anxiety symptoms which have continued to be well managed. She has been functioning well at work and in social settings.   She is continuing to work on strategies to optimize health for hip surgery which is scheduled for February. She has had success losing weight and is down to 205 lbs with diet modification and starting medication.  New treatment plan made today to focus on emotional regulation and tendency to procrastinate.        Treatment interventions:    Psychotherapy Interventions - Supportive : Active listening, Identification of strengths, Validation, and Goal-setting    Psychotherapy  Interventions - Motivational Interviewing : Connect info and skills with personal goals and Promote hope and positive expectations      Psychotherapy Interventions - Psychodynamic : Explore emotions, especially contradicting/confusing ones    Mental Status Exam    General: alert  and oriented adequately groomed  Behavior/Demeanor: cooperative and pleasant, with good  eye contact   Speech: regular rate and rhythm Intact. Normal volume, tory. No prosody.  Psychomotor: normal or unremarkable  Mood:  good  Affect: appropriate, bright ; congruent to mood and congruent to content  Thought Process/Associations: unremarkable  Thought Content:  Reports none;  no evidence of suicidal or violent ideation  Insight: good  Judgment: good    Assessment and Progress:     Behavioral Observations: Janice has been doing better lately in terms of anxiety and depression symptoms overall. She has been utilizing emotional regulation skills. Is focused on preparing for hip surgery.    Assessment: Janice is making adequate progress towards treatment goals. She at times has difficulty with emotional regulation and can experience poor distress tolerance. This has been more stable recently. Losing weight has positively impacted her self esteem. No current safety concerns.      Plan:     Next therapy appointment has been scheduled in one week to continue work on treatment goals.    For homework, patient agreed to work on goals as listed below. Also plans to get a calendar to help with organization.    Treatment Plan          SYMPTOMS; PROBLEMS   MEASURABLE GOALS;     INTERVENTIONS;   GAINS MADE DISCHARGE CRITERIA   Emotional regulation and anxiety     -reflect on patterns which lead to overwhelm     -develop strategies for thought distraction when ruminating  -develop strategies to redirect thoughts when having strong emotions   building on strengths  homework assignments    -has used ice packs and box breathing marked symptom improvement    procrastination   complete tasks in timely manner and work on strategies to improve organization     homework assignments marked symptom improvement                 Date of most recent treatment plan: 1/9/25  Date next treatment plan due: 3 months    Patient agreed with the above treatment plan on 1/9/25.  Discussed case with supervisor who also agreed with the treatment plan. Unable to sign in person due to visit being conducted through telehealth.     Name of provider: RACHEAL REESE MD  Name of supervisor (if learner): Jourdan Marquis and Dr. Torres  Patient care discussed in supervision with above supervisor, who will review and sign note.

## 2025-01-23 ENCOUNTER — VIRTUAL VISIT (OUTPATIENT)
Dept: PSYCHIATRY | Facility: CLINIC | Age: 55
End: 2025-01-23
Attending: PSYCHOLOGIST
Payer: COMMERCIAL

## 2025-01-23 DIAGNOSIS — F33.1 MDD (MAJOR DEPRESSIVE DISORDER), RECURRENT EPISODE, MODERATE (H): ICD-10-CM

## 2025-01-23 DIAGNOSIS — F41.1 GAD (GENERALIZED ANXIETY DISORDER): Primary | ICD-10-CM

## 2025-01-23 ASSESSMENT — ASTHMA QUESTIONNAIRES
QUESTION_5 LAST FOUR WEEKS HOW WOULD YOU RATE YOUR ASTHMA CONTROL: COMPLETELY CONTROLLED
QUESTION_2 LAST FOUR WEEKS HOW OFTEN HAVE YOU HAD SHORTNESS OF BREATH: NOT AT ALL
QUESTION_1 LAST FOUR WEEKS HOW MUCH OF THE TIME DID YOUR ASTHMA KEEP YOU FROM GETTING AS MUCH DONE AT WORK, SCHOOL OR AT HOME: NONE OF THE TIME
ACT_TOTALSCORE: 25
ACT_TOTALSCORE: 25
QUESTION_4 LAST FOUR WEEKS HOW OFTEN HAVE YOU USED YOUR RESCUE INHALER OR NEBULIZER MEDICATION (SUCH AS ALBUTEROL): NOT AT ALL
QUESTION_3 LAST FOUR WEEKS HOW OFTEN DID YOUR ASTHMA SYMPTOMS (WHEEZING, COUGHING, SHORTNESS OF BREATH, CHEST TIGHTNESS OR PAIN) WAKE YOU UP AT NIGHT OR EARLIER THAN USUAL IN THE MORNING: NOT AT ALL

## 2025-01-23 NOTE — PROGRESS NOTES
[facility]    OUTPATIENT PSYCHOTHERAPY PROGRESS NOTE    Patient: Cheyenne Schulte (1970), MRN: [id number]    Diagnosis:   1. SERGIO (generalized anxiety disorder)    2. MDD (major depressive disorder), recurrent episode, moderate (H)               Provider: RACHEAL REESE MD  Date of Service:  1/23/25    Service: 14365 - Psychotherapy (with patient) - 53+ minutes  Start Time: 4:00  End Time: 5:00  In- Person at the [facility]: No    Extended Session (60+ minutes): No  Interactive Complexity: No  Crisis: No    Video Visit Details:   Telemedicine Visit: The patient's condition can be safely assessed and treated via synchronous audio and visual telemedicine encounter.  Reason for Video Visit: Patient has requested telehealth visit  Originating location (patient location):  [facility]   Location- Patient's home  Distant Site (provider location): HIPAA compliant location - Provider Remote Setting- Home Office  Platform used for video visit:  Secure real time interactive audio and visual telecommunication system via [employer]    Individuals Present:   Client attended alone    Medication utilization: No changes to current psychiatric medication(s) ; managed by different provider      Subjective:  Today Janice provided update on  depression and anxiety symptoms which have continued to be well managed. She has been functioning well at work and in social settings.   She is continuing to work on strategies to optimize health for hip surgery which is scheduled for February. She has had success losing weight and is down over 30 lbs with diet modification and starting medication.  She has continued to have fatigue even though is sleeping well at night. Has been trying to make sure she is getting enough calories during the day. May potentially revisit ADHD diagnosis with her psychiatrist. Her oldest child has recently shared news of gender  transition. Janice has been processing this news well and doing her best to be supportive. Will review PLEASE DBT framework over the next week.       Treatment interventions:    Psychotherapy Interventions - Supportive : Active listening, Identification of strengths, Validation, and Goal-setting    Psychotherapy Interventions - Motivational Interviewing : Connect info and skills with personal goals and Promote hope and positive expectations      Psychotherapy Interventions - Psychodynamic : Enhance insight via self-examination    Mental Status Exam    General: alert  and oriented adequately groomed  Behavior/Demeanor: cooperative and pleasant, with good  eye contact   Speech: regular rate and rhythm Intact. Normal volume, tory. No prosody.  Psychomotor: normal or unremarkable  Mood:  good  Affect: appropriate, bright ; congruent to mood and congruent to content  Thought Process/Associations: unremarkable  Thought Content:  Reports none;  no evidence of suicidal or violent ideation  Insight: good  Judgment: good    Assessment and Progress:     Behavioral Observations: Janice has been doing better lately in terms of anxiety and depression symptoms overall. She has been utilizing emotional regulation skills and is open to exploring new tools for feelings of overwhelm. Is focused on preparing for hip surgery.     Assessment: Janice is making adequate progress towards treatment goals. She at times has difficulty with emotional regulation and can experience poor distress tolerance. This has been more stable recently. Losing weight has positively impacted her self esteem. No current safety concerns.      Plan:     Next therapy appointment has been scheduled in one week to continue work on treatment goals.     For homework, patient agreed to work on goals as listed below as well as review PLEASE DBT worksheet.    Treatment Plan          SYMPTOMS; PROBLEMS   MEASURABLE GOALS;     INTERVENTIONS;   GAINS MADE DISCHARGE  CRITERIA   Emotional regulation and anxiety     -reflect on patterns which lead to overwhelm     -develop strategies for thought distraction when ruminating  -develop strategies to redirect thoughts when having strong emotions   building on strengths  homework assignments    -has used ice packs and box breathing marked symptom improvement   procrastination   complete tasks in timely manner and work on strategies to improve organization     homework assignments marked symptom improvement                 Date of most recent treatment plan: 1/9/25  Date next treatment plan due: 3 months    Patient agreed with the above treatment plan on 1/9/25.  Discussed case with supervisor who also agreed with the treatment plan. Unable to sign in person due to visit being conducted through telehealth.     Name of provider: RACHEAL REESE MD  Name of supervisor (if learner): Jourdan Marquis and Dr. Torres  Patient care discussed in supervision with above supervisor, who will review and sign note.

## 2025-01-29 DIAGNOSIS — E66.9 OBESITY, UNSPECIFIED CLASS, UNSPECIFIED OBESITY TYPE, UNSPECIFIED WHETHER SERIOUS COMORBIDITY PRESENT: ICD-10-CM

## 2025-01-29 ASSESSMENT — PATIENT HEALTH QUESTIONNAIRE - PHQ9
10. IF YOU CHECKED OFF ANY PROBLEMS, HOW DIFFICULT HAVE THESE PROBLEMS MADE IT FOR YOU TO DO YOUR WORK, TAKE CARE OF THINGS AT HOME, OR GET ALONG WITH OTHER PEOPLE: SOMEWHAT DIFFICULT
SUM OF ALL RESPONSES TO PHQ QUESTIONS 1-9: 4
SUM OF ALL RESPONSES TO PHQ QUESTIONS 1-9: 4

## 2025-01-30 ENCOUNTER — VIRTUAL VISIT (OUTPATIENT)
Dept: PSYCHIATRY | Facility: CLINIC | Age: 55
End: 2025-01-30
Attending: PSYCHOLOGIST
Payer: COMMERCIAL

## 2025-01-30 ENCOUNTER — OFFICE VISIT (OUTPATIENT)
Dept: FAMILY MEDICINE | Facility: CLINIC | Age: 55
End: 2025-01-30
Payer: COMMERCIAL

## 2025-01-30 VITALS
SYSTOLIC BLOOD PRESSURE: 130 MMHG | TEMPERATURE: 98.3 F | HEART RATE: 92 BPM | HEIGHT: 63 IN | DIASTOLIC BLOOD PRESSURE: 80 MMHG | WEIGHT: 206.4 LBS | RESPIRATION RATE: 16 BRPM | OXYGEN SATURATION: 97 % | BODY MASS INDEX: 36.57 KG/M2

## 2025-01-30 DIAGNOSIS — M16.10 HIP ARTHRITIS: ICD-10-CM

## 2025-01-30 DIAGNOSIS — Z30.42 ENCOUNTER FOR SURVEILLANCE OF INJECTABLE CONTRACEPTIVE: ICD-10-CM

## 2025-01-30 DIAGNOSIS — F33.1 MDD (MAJOR DEPRESSIVE DISORDER), RECURRENT EPISODE, MODERATE (H): ICD-10-CM

## 2025-01-30 DIAGNOSIS — F41.1 GAD (GENERALIZED ANXIETY DISORDER): Primary | ICD-10-CM

## 2025-01-30 DIAGNOSIS — E66.01 OBESITY, CLASS III, BMI 40-49.9 (MORBID OBESITY) (H): ICD-10-CM

## 2025-01-30 DIAGNOSIS — Z01.818 PREOP GENERAL PHYSICAL EXAM: Primary | ICD-10-CM

## 2025-01-30 PROBLEM — E66.813 OBESITY, CLASS III, BMI 40-49.9 (MORBID OBESITY): Status: ACTIVE | Noted: 2025-01-30

## 2025-01-30 LAB
ANION GAP SERPL CALCULATED.3IONS-SCNC: 11 MMOL/L (ref 7–15)
BUN SERPL-MCNC: 11.7 MG/DL (ref 6–20)
CALCIUM SERPL-MCNC: 10.3 MG/DL (ref 8.8–10.4)
CHLORIDE SERPL-SCNC: 105 MMOL/L (ref 98–107)
CREAT SERPL-MCNC: 0.81 MG/DL (ref 0.51–0.95)
EGFRCR SERPLBLD CKD-EPI 2021: 86 ML/MIN/1.73M2
ERYTHROCYTE [DISTWIDTH] IN BLOOD BY AUTOMATED COUNT: 12.9 % (ref 10–15)
GLUCOSE SERPL-MCNC: 96 MG/DL (ref 70–99)
HCG UR QL: NEGATIVE
HCO3 SERPL-SCNC: 25 MMOL/L (ref 22–29)
HCT VFR BLD AUTO: 44 % (ref 35–47)
HGB BLD-MCNC: 14.6 G/DL (ref 11.7–15.7)
HOLD SPECIMEN: NORMAL
MCH RBC QN AUTO: 31.2 PG (ref 26.5–33)
MCHC RBC AUTO-ENTMCNC: 33.2 G/DL (ref 31.5–36.5)
MCV RBC AUTO: 94 FL (ref 78–100)
PLATELET # BLD AUTO: 294 10E3/UL (ref 150–450)
POTASSIUM SERPL-SCNC: 4.7 MMOL/L (ref 3.4–5.3)
RBC # BLD AUTO: 4.68 10E6/UL (ref 3.8–5.2)
SODIUM SERPL-SCNC: 141 MMOL/L (ref 135–145)
WBC # BLD AUTO: 8.9 10E3/UL (ref 4–11)

## 2025-01-30 PROCEDURE — 90837 PSYTX W PT 60 MINUTES: CPT | Mod: 95

## 2025-01-30 RX ORDER — MEDROXYPROGESTERONE ACETATE 150 MG/ML
150 INJECTION, SUSPENSION INTRAMUSCULAR
Status: ACTIVE | OUTPATIENT
Start: 2025-01-30 | End: 2026-01-25

## 2025-01-30 RX ADMIN — MEDROXYPROGESTERONE ACETATE 150 MG: 150 INJECTION, SUSPENSION INTRAMUSCULAR at 11:02

## 2025-01-30 NOTE — NURSING NOTE

## 2025-01-30 NOTE — PATIENT INSTRUCTIONS
How to Take Your Medication Before Surgery  Preoperative Medication Instructions   Antiplatelet or Anticoagulation Medication Instructions   - Patient is on no antiplatelet or anticoagulation medications.    Additional Medication Instructions   - GLP-1 Injectable (exenitide, liraglutide, semaglutide, dulaglutide, etc.): DO NOT TAKE 7 days before surgery   Hold gabapentin for 2 days prior       Patient Education   Preparing for Your Surgery  For Adults  Getting started  In most cases, a nurse will call to review your health history and instructions. They will give you an arrival time based on your scheduled surgery time. Please be ready to share:  Your doctor's clinic name and phone number  Your medical, surgical, and anesthesia history  A list of allergies and sensitivities  A list of medicines, including herbal treatments and over-the-counter drugs  Whether the patient has a legal guardian (ask how to send us the papers in advance)  Note: You may not receive a call if you were seen at our PAC (Preoperative Assessment Center).  Please tell us if you're pregnant--or if there's any chance you might be pregnant. Some surgeries may injure a fetus (unborn baby), so they require a pregnancy test. Surgeries that are safe for a fetus don't always need a test, and you can choose whether to have one.   Preparing for surgery  Within 10 to 30 days of surgery: Have a pre-op exam (sometimes called an H&P, or History and Physical). This can be done at a clinic or pre-operative center.  If you're having a , you may not need this exam. Talk to your care team.  At your pre-op exam, talk to your care team about all medicines you take. (This includes CBD oil and any drugs, such as THC, marijuana, and other forms of cannabis.) If you need to stop any medicine before surgery, ask when to start taking it again.  This is for your safety. Many medicines and drugs can make you bleed too much during surgery. Some change how well  surgery (anesthesia) drugs work.  Call your insurance company to let them know you're having surgery. (If you don't have insurance, call [phone].)  Call your clinic if there's any change in your health. This includes a scrape or scratch near the surgery site, or any signs of a cold (sore throat, runny nose, cough, rash, fever).  Eating and drinking guidelines  For your safety: Unless your surgeon tells you otherwise, follow the guidelines below.  Eat and drink as normal until 8 hours before you arrive for surgery. After that, no food or milk. You can spit out gum when you arrive.  Drink clear liquids until 2 hours before you arrive. These are liquids you can see through, like water, Gatorade, and Propel Water. They also include plain black coffee and tea (no cream or milk).  No alcohol for 24 hours before you arrive. The night before surgery, stop any drinks that contain THC.  If your care team tells you to take medicine on the morning of surgery, it's okay to take it with a sip of water. No other medicines or drugs are allowed (including CBD oil)--follow your care team's instructions.  If you have questions the day of surgery, call your hospital or surgery center.   Preventing infection  Shower or bathe the night before and the morning of surgery. Follow the instructions your clinic gave you. (If no instructions, use regular soap.)  Don't shave or clip hair near your surgery site. We'll remove the hair if needed.  Don't smoke or vape the morning of surgery. No chewing tobacco for 6 hours before you arrive. A nicotine patch is okay. You may spit out nicotine gum when you arrive.  For some surgeries, the surgeon will tell you to fully quit smoking and nicotine.  We will make every effort to keep you safe from infection. We will:  Clean our hands often with soap and water (or an alcohol-based hand rub).  Clean the skin at your surgery site with a special soap that kills germs.  Give you a special gown to keep you  warm. (Cold raises the risk of infection.)  Wear hair covers, masks, gowns, and gloves during surgery.  Give antibiotic medicine, if prescribed. Not all surgeries need this medicine.  What to bring on the day of surgery  Photo ID and insurance card  Copy of your health care directive, if you have one  Glasses and hearing aids (bring cases)  You can't wear contacts during surgery  Inhaler and eye drops, if you use them (tell us about these when you arrive)  CPAP machine or breathing device, if you use them  A few personal items, if spending the night  If you have . . .  A pacemaker, ICD (cardiac defibrillator), or other implant: Bring the ID card.  An implanted stimulator: Bring the remote control.  A legal guardian: Bring a copy of the certified (court-stamped) guardianship papers.  Please remove any jewelry, including body piercings. Leave jewelry and other valuables at home.  If you're going home the day of surgery  You must have a responsible adult drive you home. They should stay with you overnight as well.  If you don't have someone to stay with you, and you aren't safe to go home alone, we may keep you overnight. Insurance often won't pay for this.  After surgery  If it's hard to control your pain or you need more pain medicine, please call your surgeon's office.  Questions?   If you have any questions for your care team, list them here:   ____________________________________________________________________________________________________________________________________________________________________________________________________________________________________________________________  For informational purposes only. Not to replace the advice of your health care provider. Copyright   2003, 2019 [employer]. All rights reserved. Clinically reviewed by Bi Odell MD. [employer]ART[employer] [id number] - REV 08/24.

## 2025-01-30 NOTE — PROGRESS NOTES
[facility]    OUTPATIENT PSYCHOTHERAPY PROGRESS NOTE    Patient: Cheyenne Schulte (1970), MRN: [id number]    Diagnosis:   1. SERGIO (generalized anxiety disorder)    2. MDD (major depressive disorder), recurrent episode, moderate (H)                 Provider: RACHEAL REESE MD  Date of Service:  1/30/25    Service: 50262 - Psychotherapy (with patient) - 53+ minutes  Start Time: 4:03  End Time: 5:00  In- Person at the [place]: No    Extended Session (60+ minutes): No  Interactive Complexity: No  Crisis: No    Video Visit Details:   Telemedicine Visit: The patient's condition can be safely assessed and treated via synchronous audio and visual telemedicine encounter.  Reason for Video Visit: Patient has requested telehealth visit  Originating location (patient location):  [facility]   Location- Patient's home  Distant Site (provider location): HIPAA compliant location - Provider Remote Setting- Home Office  Platform used for video visit:  Secure real time interactive audio and visual telecommunication system via [employer]    Individuals Present:   Client attended alone    Medication utilization: No changes to current psychiatric medication(s) ; managed by different provider      Subjective:  Today Janice checked in regarding depression and anxiety symptoms. This past week her mood has been lower, mostly due to processing recent political events. Has been especially worried about her oldest child who recently came out as transgender. She has continued to function well in work and social settings. Has been continuing to work on healthy diet choices and continuing her weight loss journey. Had pre op appt today, and continues to prepare for hip surgery end of February. She has continued to have some fatigue. Will review PLEASE DBT framework over the next week.       Treatment interventions:    Psychotherapy Interventions - Supportive :  Active listening, Identification of strengths, Validation, and Goal-setting    Psychotherapy Interventions - Motivational Interviewing : Connect info and skills with personal goals and Promote hope and positive expectations      Psychotherapy Interventions - Psychodynamic : Enhance insight via self-examination    Mental Status Exam    General: alert  and oriented adequately groomed  Behavior/Demeanor: cooperative and pleasant, with good  eye contact   Speech: regular rate and rhythm Intact. Normal volume, tory. No prosody.  Psychomotor: normal or unremarkable  Mood: worried, okay  Affect: appropriate, bright ; congruent to mood and congruent to content  Thought Process/Associations: unremarkable  Thought Content:  Reports none;  no evidence of suicidal or violent ideation  Insight: good  Judgment: good    Assessment and Progress:     Behavioral Observations: Janice has been doing better lately in terms of anxiety and depression symptoms overall. She has been utilizing emotional regulation skills and is open to exploring new tools for feelings of overwhelm. Is focused on preparing for hip surgery. Has been making positive changes with her diet and re examining her relationship with food.    Assessment: Janice is making adequate progress towards treatment goals. Has good insight into symptoms and has been reflecting on patterns of behavior that impact her mood. No current safety concerns.      Plan:     Next therapy appointment has been scheduled in two weeks to continue work on treatment goals.     For homework, patient agreed to work on goals as listed below as well as review PLEASE DBT worksheet.    Treatment Plan          SYMPTOMS; PROBLEMS   MEASURABLE GOALS;     INTERVENTIONS;   GAINS MADE DISCHARGE CRITERIA   Emotional regulation and anxiety     -reflect on patterns which lead to overwhelm     -develop strategies for thought distraction when ruminating  -develop strategies to redirect thoughts when having  strong emotions   building on strengths  homework assignments    -has used ice packs and box breathing marked symptom improvement   procrastination   complete tasks in timely manner and work on strategies to improve organization     homework assignments marked symptom improvement                 Date of most recent treatment plan: 1/9/25  Date next treatment plan due: 3 months    Patient agreed with the above treatment plan on 1/9/25.  Discussed case with supervisor who also agreed with the treatment plan. Unable to sign in person due to visit being conducted through telehealth.     Name of provider: RACHEAL REESE MD  Name of supervisor (if learner): Jourdan Marquis and Dr. Torres  Patient care discussed in supervision with above supervisor, who will review and sign note.

## 2025-01-30 NOTE — PROGRESS NOTES
Preoperative Evaluation  [facility] [place]  [address]  [place] [address]  Phone: [phone]  Primary Provider: [facility] Vaishali Cobb  Pre-op Performing Provider: Jh Avalos MD  Jan 30, 2025 1/23/2025   Surgical Information   What procedure is being done? Right Total Hip Replacement   Facility or Hospital where procedure/surgery will be performed: [facility]   Who is doing the procedure / surgery? Mac Nunn MD   Date of surgery / procedure: 2/25/2025   Time of surgery / procedure: 9:00 AM   Where do you plan to recover after surgery? at home with family     Fax number for surgical facility: [phone]    Assessment & Plan     The proposed surgical procedure is considered INTERMEDIATE risk.      ICD-10-CM    1. Preop general physical exam  Z01.818 CBC with Platelets and Reflex to Iron Studies     Basic metabolic panel  (Ca, Cl, CO2, Creat, Gluc, K, Na, BUN)     CBC with Platelets and Reflex to Iron Studies     Basic metabolic panel  (Ca, Cl, CO2, Creat, Gluc, K, Na, BUN)      2. Encounter for surveillance of injectable contraceptive  Z30.42 HCG qualitative urine     medroxyPROGESTERone (DEPO-PROVERA) syringe 150 mg     HCG qualitative urine      3. Hip arthritis  M16.10       4. Obesity, Class III, BMI 40-49.9 (morbid obesity) (H)  E66.01         The longitudinal plan of care for the diagnosis(es)/condition(s) as documented were addressed during this visit. Due to the added complexity in care, I will continue to support Janice in the subsequent management and with ongoing continuity of care.               - No identified additional risk factors other than previously addressed    Antiplatelet or Anticoagulation Medication Instructions   - Patient is on no antiplatelet or anticoagulation medications.    Additional Medication Instructions   - GLP-1 Injectable (exenitide, liraglutide, semaglutide, dulaglutide, etc.): DO NOT TAKE 7  days before surgery   Hold gabapentin the day of the procedure    Recommendation  Approval given to proceed with proposed procedure, without further diagnostic evaluation.    Angel Camacho is a 54 year old, presenting for the following:  Pre-Op Exam    Hemoglobin A1C   Date Value Ref Range Status   06/06/2024 5.6 0.0 - 5.6 % Final     Comment:     Normal <5.7%   Prediabetes 5.7-6.4%    Diabetes 6.5% or higher     Note: Adopted from ADA consensus guidelines.   06/07/2022 5.6 0.0 - 5.6 % Final     Comment:     Normal <5.7%   Prediabetes 5.7-6.4%    Diabetes 6.5% or higher     Note: Adopted from ADA consensus guidelines.           1/30/2025     9:19 AM   Additional Questions   Roomed by Erica   Accompanied by none         1/30/2025   Forms   Any forms needing to be completed Yes         1/30/2025     9:19 AM   Patient Reported Additional Medications   Patient reports taking the following new medications none     HPI related to upcoming procedure: Patient presents for pre-op evaluation in anticipation for right hip arthroplasty        1/23/2025   Pre-Op Questionnaire   Have you ever had a heart attack or stroke? No   Have you ever had surgery on your heart or blood vessels, such as a stent placement, a coronary artery bypass, or surgery on an artery in your head, neck, heart, or legs? No   Do you have chest pain with activity? No   Do you have a history of heart failure? No   Do you currently have a cold, bronchitis or symptoms of other infection? No   Do you have a cough, shortness of breath, or wheezing? No   Do you or anyone in your family have previous history of blood clots? No   Do you or does anyone in your family have a serious bleeding problem such as prolonged bleeding following surgeries or cuts? No   Have you ever had problems with anemia or been told to take iron pills? No   Have you had any abnormal blood loss such as black, tarry or bloody stools, or abnormal vaginal bleeding? No   Have you ever had  a blood transfusion? No   Are you willing to have a blood transfusion if it is medically needed before, during, or after your surgery? Yes   Have you or any of your relatives ever had problems with anesthesia? No   Do you have sleep apnea, excessive snoring or daytime drowsiness? (!) UNKNOWN   Do you have any artifical heart valves or other implanted medical devices like a pacemaker, defibrillator, or continuous glucose monitor? No   Do you have artificial joints? No   Are you allergic to latex? No     Health Care Directive  Patient does not have a Health Care Directive: Discussed advance care planning with patient; however, patient declined at this time.    Preoperative Review of    reviewed - no record of controlled substances prescribed.      Status of Chronic Conditions:  See problem list for active medical problems.  Problems all longstanding and stable, except as noted/documented.  See ROS for pertinent symptoms related to these conditions.    Patient Active Problem List    Diagnosis Date Noted    Obesity, Class III, BMI 40-49.9 (morbid obesity) (H) 01/30/2025     Priority: Medium    Cervical high risk HPV (human papillomavirus) test positive 07/19/2022     Priority: Medium    Papanicolaou smear of cervix with low grade squamous intraepithelial lesion (LGSIL) 07/19/2022     Priority: Medium     2002, 2004, 2005, 2006 NIL paps  4/18/08 ASCUS, neg HPV  10/2/12 NIL  Above per Care Everywhere  Gap in care/records  7/19/22 LSIL, +HR HPV (not 16/18). Plan: colposcopy due before 10/19/22  10/21/22 [place]: Bx-ALKA 1, ECC-neg. Plan: cotest in 1 year  12/5/23 Lost to follow-up for pap tracking   6/25/24 NIL pap, Neg HPV. Plan: cotest in 3 years      Intermittent asthma      Priority: Medium      Past Medical History:   Diagnosis Date    ADHD (attention deficit hyperactivity disorder)     sees psych     Arthritis 5/26/23    It s probably in my right hip too    Depressive disorder 1990 s    Diabetes (H) 2008     Gestational 2008    Family history of colon cancer     Intermittent asthma     Mild major depression     sees psych      Past Surgical History:   Procedure Laterality Date    ABDOMEN SURGERY      Hernia repair    COLONOSCOPY N/A 07/13/2022    Procedure: COLONOSCOPY, FLEXIBLE, WITH LESION REMOVAL USING SNARE;  Surgeon: Shai Da Silva DO;  Location: [facility] OR    COLONOSCOPY N/A 07/13/2022    Procedure: COLONOSCOPY, WITH POLYPECTOMY AND BIOPSY;  Surgeon: Shai Da Silva DO;  Location: [facility] OR    COLONOSCOPY N/A 10/28/2022    Procedure: COLONOSCOPY, FLEXIBLE, WITH LESION REMOVAL USING SNARE;  Surgeon: Jennifer Ash DO;  Location: [facility]    COLONOSCOPY N/A 10/28/2022    Procedure: COLONOSCOPY, WITH POLYPECTOMY AND BIOPSY;  Surgeon: Jennifer Ash DO;  Location: [facility]    COLONOSCOPY N/A 06/20/2023    Procedure: COLONOSCOPY, WITH POLYPECTOMY AND BIOPSY;  Surgeon: Jennifer Ash DO;  Location: [facility]    COLONOSCOPY N/A 06/20/2023    Procedure: COLONOSCOPY, FLEXIBLE, WITH LESION REMOVAL USING SNARE;  Surgeon: Jennifer Ash DO;  Location: [facility]    COLONOSCOPY N/A 10/28/2024    Procedure: COLONOSCOPY, WITH POLYPECTOMY AND CLIPPING x4;  Surgeon: Raheem Yan MD;  Location: [facility] OR    COLONOSCOPY WITH CO2 INSUFFLATION N/A 07/13/2022    Procedure: COLONOSCOPY, WITH CO2 INSUFFLATION;  Surgeon: Shai Da Silva DO;  Location: [facility]    COLONOSCOPY WITH CO2 INSUFFLATION N/A 10/28/2022    Procedure: COLONOSCOPY, WITH CO2 INSUFFLATION;  Surgeon: Jennifer Ash DO;  Location: [facility]    COLONOSCOPY WITH CO2 INSUFFLATION N/A 06/20/2023    Procedure: Colonoscopy with CO2 insufflation;  Surgeon: Jennifer Ash DO;  Location: [facility] OR    DILATION AND CURETTAGE      AB    GYN SURGERY      c section    HERNIA REPAIR      umbilical    ORTHOPEDIC SURGERY      ORIF right tibia     Current Outpatient Medications   Medication Sig Dispense Refill    albuterol (PROAIR HFA/PROVENTIL HFA/VENTOLIN HFA) 108 (90 Base) MCG/ACT  "inhaler Inhale 2 puffs into the lungs every 6 hours 18 g 3    busPIRone HCl (BUSPAR) 30 MG tablet Take 1 tablet (30 mg) by mouth 2 times daily. 60 tablet 2    clindamycin (CLEOCIN T) 1 % external lotion Apply topically every morning. To all areas of HS, twice daily with flares 60 mL 11    COMPOUNDED NON-CONTROLLED SUBSTANCE (CMPD RX) - PHARMACY TO MIX COMPOUNDED MEDICATION Compounded semaglutide 1 mg subcutaneous injection once weekly due to shortage 4 each 0    gabapentin (NEURONTIN) 100 MG capsule Take 1-3 capsules (100-300 mg) by mouth nightly as needed for other (sleep/anxiety). 60 capsule 0    nicotine (NICODERM CQ) 14 MG/24HR 24 hr patch Place 1 patch onto the skin every 24 hours      nicotine (NICODERM CQ) 21 MG/24HR 24 hr patch Place 1 patch onto the skin every 24 hours      nicotine (NICODERM CQ) 7 MG/24HR 24 hr patch Place 1 patch onto the skin every 24 hours      nicotine polacrilex (NICORETTE) 4 MG gum How to take it: When the urge to smoke occurs, chew gum until you feel a tingle, then \"park\" the gum in your cheek until the tingle is gone. Re-chew every few minutes and \"park\" again, chewing one piece for 30 minutes. Do not eat or drink while chewing. Follow this schedule: Weeks 1 to 6: One piece of gum every 1 to 2 hours. Use at least 9 pieces a day, but no more than 24. Weeks 7 to 9: One piece of gum every 2 to 4 hours. Weeks 10 to 12: One piece of gum every 4 to 8 hours.      Resorcinol POWD Resorcinol 15% in vanicream apply topically twice daily to affected areas of HS. 30 g 11    Semaglutide-Weight Management (WEGOVY) 0.25 MG/0.5ML pen Inject 0.25 mg subcutaneously once a week. 2 mL 0    venlafaxine (EFFEXOR XR) 150 MG 24 hr capsule Take 1 capsule (150 mg) by mouth daily. Take along with a 75mg capsule for a total daily dose of 225mg 30 capsule 2    venlafaxine (EFFEXOR XR) 75 MG 24 hr capsule Take 1 capsule (75 mg) by mouth daily. Take along with with a 150mg capsule for a total daily dose of 225mg " "30 capsule 2       Allergies   Allergen Reactions    Theophylline Cr     Zyban [Bupropion Hydrobromide]     Bupropion Rash        Social History     Tobacco Use    Smoking status: Every Day     Current packs/day: 1.00     Average packs/day: 1 pack/day for 30.0 years (30.0 ttl pk-yrs)     Types: Cigarettes    Smokeless tobacco: Never    Tobacco comments:     I ve tried quitting numerous times. Only thing that ever worked was pregnancy.   Substance Use Topics    Alcohol use: Yes     Alcohol/week: 4.0 standard drinks of alcohol     Types: 4 Cans of beer per week     Comment: 4 drinks a week       History   Drug Use    Types: Marijuana     Comment: Helps my pain             Review of Systems  Constitutional, HEENT, cardiovascular, pulmonary, gi and gu systems are negative, except as otherwise noted.    Objective    /80   Pulse 92   Temp 98.3  F (36.8  C) (Temporal)   Resp 16   Ht 1.6 m (5' 3\")   Wt 93.6 kg (206 lb 6.4 oz)   SpO2 97%   BMI 36.56 kg/m     Estimated body mass index is 36.56 kg/m  as calculated from the following:    Height as of this encounter: 1.6 m (5' 3\").    Weight as of this encounter: 93.6 kg (206 lb 6.4 oz).  Physical Exam  Constitutional:       General: She is not in acute distress.     Appearance: Normal appearance. She is not ill-appearing.   HENT:      Head: Normocephalic and atraumatic.      Right Ear: Tympanic membrane and ear canal normal.      Left Ear: Tympanic membrane and ear canal normal.      Nose: Nose normal.      Mouth/Throat:      Mouth: Mucous membranes are moist.      Pharynx: Oropharynx is clear.   Eyes:      General: No scleral icterus.     Extraocular Movements: Extraocular movements intact.   Cardiovascular:      Rate and Rhythm: Normal rate and regular rhythm.      Heart sounds: Normal heart sounds. No murmur heard.  Pulmonary:      Effort: Pulmonary effort is normal. No respiratory distress.      Breath sounds: Normal breath sounds. No wheezing.   Abdominal:     "  General: Abdomen is flat.      Palpations: Abdomen is soft.      Tenderness: There is no abdominal tenderness.   Musculoskeletal:         General: Normal range of motion.      Cervical back: Normal range of motion.   Skin:     General: Skin is warm.      Findings: No erythema or lesion.   Neurological:      General: No focal deficit present.      Mental Status: She is alert and oriented to person, place, and time.   Psychiatric:         Mood and Affect: Mood normal.         Behavior: Behavior normal.         Thought Content: Thought content normal.           Recent Labs   Lab Test 06/06/24  0821      POTASSIUM 5.0   CR 0.93   A1C 5.6        Diagnostics  Labs pending at this time.  Results will be reviewed when available.   No EKG required, no history of coronary heart disease, significant arrhythmia, peripheral arterial disease or other structural heart disease.    Revised Cardiac Risk Index (RCRI)  The patient has the following serious cardiovascular risks for perioperative complications:   - No serious cardiac risks = 0 points     RCRI Interpretation: 0 points: Class I (very low risk - 0.4% complication rate)         Signed Electronically by: Jh Avalos MD  A copy of this evaluation report is provided to the requesting physician.         Answers submitted by the patient for this visit:  Patient Health Questionnaire (Submitted on 1/29/2025)  If you checked off any problems, how difficult have these problems made it for you to do your work, take care of things at home, or get along with other people?: Somewhat difficult  PHQ9 TOTAL SCORE: 4

## 2025-02-06 ENCOUNTER — VIRTUAL VISIT (OUTPATIENT)
Dept: PHARMACY | Facility: CLINIC | Age: 55
End: 2025-02-06
Payer: COMMERCIAL

## 2025-02-06 DIAGNOSIS — E66.9 OBESITY, UNSPECIFIED CLASS, UNSPECIFIED OBESITY TYPE, UNSPECIFIED WHETHER SERIOUS COMORBIDITY PRESENT: Primary | ICD-10-CM

## 2025-02-06 NOTE — PATIENT INSTRUCTIONS
"Recommendations from today's MTM visit:                                                         Continue medications as prescribed    Follow-up: Return in about 4 weeks (around 3/6/2025).    It was great speaking with you today.  I value your experience and would be very thankful for your time in providing feedback in our clinic survey. In the next few days, you may receive an email or text message from [facility] with a link to a survey related to your  clinical pharmacist.\"     To schedule another MTM appointment, please call the clinic directly or you may call the MTM scheduling line at [phone] or toll-free at [phone].     My Clinical Pharmacist's contact information:                                                      Please feel free to contact me with any questions or concerns you have.      Jose De Jesus Howard, PharmD   "

## 2025-02-06 NOTE — PROGRESS NOTES
"Medication Therapy Management (MTM) Encounter    ASSESSMENT:                            Medication Adherence/Access: No issues identified.    Weight Management:   Can consider increasing semaglutide, but Janice would like to remain on this current dose for now. PharmD agreed    PLAN:                            Continue medications as prescribed    Follow-up: Return in about 4 weeks (around 3/6/2025).    SUBJECTIVE/OBJECTIVE:                          Janice Schulte is a 54 year old female seen for a follow-up visit.       Reason for visit: Medication Therapy Management.    Allergies/ADRs: Reviewed in chart  Past Medical History: Reviewed in chart  Tobacco: She reports that she has been smoking cigarettes. She has a 30 pack-year smoking history. She has never used smokeless tobacco.Nicotine/Tobacco Cessation Plan  Information offered: Patient not interested at this time    Alcohol: Not currently using    Medication Adherence/Access: no issues reported.    Weight Management   -Semaglutide 1 mg once a week  Patient reports no current medication side effects.  Nutrition/Eating Habits: Not much of a snacker.    Exercise/Activity: Is limited due to hip.   Medications Tried/Failed:  None.     Interested in going up on dosage as she feels it would provide more control.    Initial Consult Weight: 237 lbs Goal is 225 lbs by February and ideal weight is 170 lbs      Down to 215 pounds - has met goal weight on 12/5/2024    200.7 pounds as of 2/6/2025               Cumulative Weight Loss: -7 lb  Wt Readings from Last 4 Encounters:   01/30/25 206 lb 6.4 oz (93.6 kg)   01/07/25 207 lb (93.9 kg)   10/28/24 236 lb (107 kg)   06/25/24 234 lb 9.6 oz (106.4 kg)     Estimated body mass index is 36.56 kg/m  as calculated from the following:    Height as of 1/30/25: 5' 3\" (1.6 m).    Weight as of 1/30/25: 206 lb 6.4 oz (93.6 kg).        Today's Vitals: There were no vitals taken for this visit.  ----------------      I spent 8 minutes with this " patient today. All changes were made via collaborative practice agreement with [facility].     A summary of these recommendations was sent via [employer].    Jose De Jesus Howard PharmD    Telemedicine Visit Details  The patient's medications can be safely assessed via a telemedicine encounter.  Type of service:  Telephone visit  Originating Location (pt. Location): Home    Distant Location (provider location):  On-site  Start Time:  1:00 PM  End Time: 1:08 PM     Medication Therapy Recommendations  No medication therapy recommendations to display

## 2025-02-13 ENCOUNTER — VIRTUAL VISIT (OUTPATIENT)
Dept: PSYCHIATRY | Facility: CLINIC | Age: 55
End: 2025-02-13
Attending: PSYCHOLOGIST
Payer: COMMERCIAL

## 2025-02-13 DIAGNOSIS — F41.1 GAD (GENERALIZED ANXIETY DISORDER): Primary | ICD-10-CM

## 2025-02-13 DIAGNOSIS — F33.41 MAJOR DEPRESSIVE DISORDER, RECURRENT EPISODE, IN PARTIAL REMISSION: ICD-10-CM

## 2025-02-13 PROCEDURE — 90837 PSYTX W PT 60 MINUTES: CPT | Mod: 95

## 2025-02-13 NOTE — PROGRESS NOTES
[facility]    OUTPATIENT PSYCHOTHERAPY PROGRESS NOTE    Patient: Cheyenne Schulte (1970), MRN: [id number]    Diagnosis:   1. SERGIO (generalized anxiety disorder)    2. Major depressive disorder, recurrent episode, in partial remission                   Provider: RACHEAL REESE MD  Date of Service:  2/13/25    Service: 95161 - Psychotherapy (with patient) - 53+ minutes  Start Time: 4:01  End Time: 5:03  In- Person at the [facility]: No    Extended Session (60+ minutes): No  Interactive Complexity: No  Crisis: No    Video Visit Details:   Telemedicine Visit: The patient's condition can be safely assessed and treated via synchronous audio and visual telemedicine encounter.  Reason for Video Visit: Patient has requested telehealth visit  Originating location (patient location):  [facility]   Location- Patient's home  Distant Site (provider location): HIPAA compliant location - Provider Remote Setting- Home Office  Platform used for video visit:  Secure real time interactive audio and visual telecommunication system via [employer]    Individuals Present:   Client attended alone    Medication utilization: No changes to current psychiatric medication(s) ; managed by different provider      Subjective:  Today Janice checked in regarding depression and anxiety symptoms. Mood has continued to be affected by political news and events.  Has been especially worried about her oldest child who recently came out as transgender. She has had difficult emotions and reactions to social media. Sleep has been somewhat worse this week due to anxiety.  She has continued to function well in work and social settings. Has been continuing to work on healthy diet choices and continuing her weight loss journey. Had some issues with insurance regarding her pre op authorization but was able to call and resolve the issue. Is looking forward to hip surgery on the  25th. Her children and her partner are planning to be around and helping with her recovery.    Treatment interventions:    Psychotherapy Interventions - Supportive : Active listening, Identification of strengths, Validation, and Goal-setting    Psychotherapy Interventions - Motivational Interviewing : Connect info and skills with personal goals and Promote hope and positive expectations      Psychotherapy Interventions - Psychodynamic : Enhance insight via self-examination    Mental Status Exam    General: alert  and oriented adequately groomed  Behavior/Demeanor: cooperative and pleasant, with good  eye contact   Speech: regular rate and rhythm Intact. Normal volume, tory. No prosody.  Psychomotor: normal or unremarkable  Mood: anxious  Affect: appropriate ; full range, congruent to mood and congruent to content  Thought Process/Associations: unremarkable  Thought Content:  Reports none;  no evidence of suicidal or violent ideation  Insight: good  Judgment: good    Assessment and Progress:     Behavioral Observations: Janice has been doing better lately in terms of depression symptoms.Has had difficulty with sleep and increased anxiety surrounding political news. She has been utilizing emotional regulation skills and is open to exploring new tools for feelings of overwhelm. Is focused on preparing for hip surgery. Has been making positive changes with her diet and re examining her relationship with food.    Assessment: Janice is making adequate progress towards treatment goals. Has good insight into symptoms and has been reflecting on patterns of behavior that impact her mood. No current safety concerns. Overall chronic symptoms consistent with previous diagnoses of SERGIO. MDD in partial remission.    Plan:     Next therapy appointment has been scheduled in three weeks to continue work on treatment goals. Pt will be recovering from hip surgery so will need a short break between sessions.    For homework, patient  agreed to work on goals as listed below as well as review PLEASE DBT worksheet.    Treatment Plan          SYMPTOMS; PROBLEMS   MEASURABLE GOALS;     INTERVENTIONS;   GAINS MADE DISCHARGE CRITERIA   Emotional regulation and anxiety     -reflect on patterns which lead to overwhelm     -develop strategies for thought distraction when ruminating  -develop strategies to redirect thoughts when having strong emotions   building on strengths  homework assignments    -has used ice packs and box breathing     marked symptom improvement   procrastination   complete tasks in timely manner and work on strategies to improve organization     homework assignments  -started using calendar to organize marked symptom improvement                 Date of most recent treatment plan: 1/9/25  Date next treatment plan due: 3 months    Patient agreed with the above treatment plan on 1/9/25.  Discussed case with supervisor who also agreed with the treatment plan. Unable to sign in person due to visit being conducted through telehealth.     Name of provider: RACHEAL REESE MD  Name of supervisor (if learner): Jourdan Marquis and Dr. Torres  Patient care discussed in supervision with above supervisor, who will review and sign note.

## 2025-02-25 ENCOUNTER — TRANSFERRED RECORDS (OUTPATIENT)
Dept: HEALTH INFORMATION MANAGEMENT | Facility: CLINIC | Age: 55
End: 2025-02-25
Payer: COMMERCIAL

## 2025-03-06 ENCOUNTER — VIRTUAL VISIT (OUTPATIENT)
Dept: PSYCHIATRY | Facility: CLINIC | Age: 55
End: 2025-03-06
Attending: PSYCHOLOGIST
Payer: COMMERCIAL

## 2025-03-06 ENCOUNTER — VIRTUAL VISIT (OUTPATIENT)
Dept: PHARMACY | Facility: CLINIC | Age: 55
End: 2025-03-06
Payer: COMMERCIAL

## 2025-03-06 DIAGNOSIS — F33.41 MAJOR DEPRESSIVE DISORDER, RECURRENT EPISODE, IN PARTIAL REMISSION: Primary | ICD-10-CM

## 2025-03-06 DIAGNOSIS — E66.9 OBESITY, UNSPECIFIED CLASS, UNSPECIFIED OBESITY TYPE, UNSPECIFIED WHETHER SERIOUS COMORBIDITY PRESENT: Primary | ICD-10-CM

## 2025-03-06 DIAGNOSIS — F41.1 GAD (GENERALIZED ANXIETY DISORDER): ICD-10-CM

## 2025-03-06 PROCEDURE — 90837 PSYTX W PT 60 MINUTES: CPT | Mod: 95

## 2025-03-06 NOTE — PROGRESS NOTES
[facility]    OUTPATIENT PSYCHOTHERAPY PROGRESS NOTE    Patient: Cheyenne Schulte (1970), MRN: [id number]    Diagnosis:   1. Major depressive disorder, recurrent episode, in partial remission    2. SERGIO (generalized anxiety disorder)           Provider: RACHEAL REESE MD  Date of Service:  3/06/25    Service: 30373 - Psychotherapy (with patient) - 53+ minutes  Start Time: 4:01  End Time: 5:00  In- Person at the [facility]: No    Extended Session (60+ minutes): No  Interactive Complexity: No  Crisis: No    Video Visit Details:   Telemedicine Visit: The patient's condition can be safely assessed and treated via synchronous audio and visual telemedicine encounter.  Reason for Video Visit: Patient has requested telehealth visit  Originating location (patient location):  [facility]   Location- Patient's home  Distant Site (provider location): HIPAA compliant location - Provider Remote Setting- Home Office  Platform used for video visit:  Secure real time interactive audio and visual telecommunication system via [employer]    Individuals Present:   Client attended alone    Medication utilization: No changes to current psychiatric medication(s) ; managed by different provider      Subjective:  Today Janice reports doing well. Mood is stable and she has been recovering without complication from hip surgery. She is excited and future oriented. [place] supported by children and partner following surgery. Sleep has improved. Anxiety has lessened. Has been continuing to work on healthy diet choices and continuing her weight loss journey. Will continue weight loss medication but at lower dose to avoid nausea. Is looking forward to getting out of the house this weekend for an event at [employer].       Treatment interventions:    Psychotherapy Interventions - Supportive : Active listening, Encouragement, praise, ego support, Identification of  strengths, and Validation    Psychotherapy Interventions - Motivational Interviewing : Promote hope and positive expectations      Psychotherapy Interventions - Psychodynamic : Enhance insight via self-examination    Mental Status Exam    General: alert  and oriented adequately groomed  Behavior/Demeanor: cooperative and pleasant, with good  eye contact   Speech: regular rate and rhythm Intact. Normal volume, tory. No prosody.  Psychomotor: normal or unremarkable  Mood:  great  Affect: appropriate ; full range, congruent to mood and congruent to content  Thought Process/Associations: unremarkable  Thought Content:  Reports none;  no evidence of suicidal or violent ideation  Insight: good  Judgment: good    Assessment and Progress:     Behavioral Observations: Janice has been doing better lately in terms of depression and anxiety symptoms. She is healing well from hip replacement surgery and is working hard doing all her PT exercises. Sleep has improved. She has been utilizing emotional regulation skills and is open to exploring new tools for feelings of overwhelm.  Continues to make positive changes with her diet and re examine her relationship with food.    Assessment: Janice is making adequate progress towards treatment goals. Has good insight into symptoms and has been reflecting on patterns of behavior that impact her mood. No current safety concerns. Sleep and mood overall improving. Overall chronic symptoms consistent with previous diagnoses of SEGRIO. MDD in partial remission.    Plan:     Next therapy appointment has been scheduled in two weeks to continue work on treatment goals.     For homework, patient agreed to work on goals as listed below.    Treatment Plan          SYMPTOMS; PROBLEMS   MEASURABLE GOALS;     INTERVENTIONS;   GAINS MADE DISCHARGE CRITERIA   Emotional regulation and anxiety     -reflect on patterns which lead to overwhelm     -develop strategies for thought distraction when  ruminating  -develop strategies to redirect thoughts when having strong emotions   building on strengths  homework assignments    -has used ice packs and box breathing     marked symptom improvement   procrastination   complete tasks in timely manner and work on strategies to improve organization     homework assignments  -started using calendar to organize marked symptom improvement                 Date of most recent treatment plan: 1/9/25  Date next treatment plan due: 3 months    Patient agreed with the above treatment plan on 1/9/25.  Discussed case with supervisor who also agreed with the treatment plan. Unable to sign in person due to visit being conducted through telehealth.     Name of provider: RACHEAL REESE MD  Name of supervisor (if learner): Jourdan Marquis and Dr. Torres  Patient care discussed in supervision with above supervisor, who will review and sign note.

## 2025-03-06 NOTE — PATIENT INSTRUCTIONS
"Recommendations from today's MTM visit:                                                         Restart 1mg of Semaglutide once a week    Follow-up: Return in about 5 weeks (around 4/10/2025).    It was great speaking with you today.  I value your experience and would be very thankful for your time in providing feedback in our clinic survey. In the next few days, you may receive an email or text message from [facility] [employer] with a link to a survey related to your  clinical pharmacist.\"     To schedule another MTM appointment, please call the clinic directly or you may call the MTM scheduling line at [phone] or toll-free at [phone].     My Clinical Pharmacist's contact information:                                                      Please feel free to contact me with any questions or concerns you have.      Jose De Jesus Howard, PharmD   "

## 2025-03-06 NOTE — PROGRESS NOTES
"Medication Therapy Management (MTM) Encounter    ASSESSMENT:                            Medication Adherence/Access: No issues identified.    Weight Management:   Stable.    Janice has not been on her injection since the surgery, however she should be safe to restart now.     PLAN:                            Restart 1mg of Semaglutide once a week    Follow-up: Return in about 5 weeks (around 4/10/2025).    SUBJECTIVE/OBJECTIVE:                          Janice Schulte is a 54 year old female seen for a follow-up visit.       Reason for visit: Medication Therapy Management.    Allergies/ADRs: Reviewed in chart  Past Medical History: Reviewed in chart  Tobacco: She reports that she has been smoking cigarettes. She has a 30 pack-year smoking history. She has never used smokeless tobacco.Nicotine/Tobacco Cessation Plan  Information offered: Patient not interested at this time    Alcohol: not currently using    Medication Adherence/Access: no issues reported.    Weight Management   -Semaglutide 1 mg once a week  Patient reports no current medication side effects.  Nutrition/Eating Habits: Not much of a snacker.    Exercise/Activity: Is limited due to hip.   Medications Tried/Failed:  None.     Interested in going up on dosage as she feels it would provide more control.    Initial Consult Weight: 237 lbs Goal is 225 lbs by February and ideal weight is 170 lbs      200.7 pounds as of 2/6/2025    197 pounds as of 3/6/2025                   Wt Readings from Last 4 Encounters:   01/30/25 206 lb 6.4 oz (93.6 kg)   01/07/25 207 lb (93.9 kg)   10/28/24 236 lb (107 kg)   06/25/24 234 lb 9.6 oz (106.4 kg)     Estimated body mass index is 36.56 kg/m  as calculated from the following:    Height as of 1/30/25: 5' 3\" (1.6 m).    Weight as of 1/30/25: 206 lb 6.4 oz (93.6 kg).    Today's Vitals: There were no vitals taken for this visit.  ----------------      I spent 10 minutes with this patient today. All changes were made via collaborative " practice agreement with [facility].     A summary of these recommendations was sent via [employer].    Jose De Jesus Howard PharmD    Telemedicine Visit Details  The patient's medications can be safely assessed via a telemedicine encounter.  Type of service:  Telephone visit  Originating Location (pt. Location): Home    Distant Location (provider location):  On-site  Start Time: 8:59 AM  End Time: 9:09 AM     Medication Therapy Recommendations  No medication therapy recommendations to display

## 2025-03-12 ENCOUNTER — TRANSFERRED RECORDS (OUTPATIENT)
Dept: HEALTH INFORMATION MANAGEMENT | Facility: CLINIC | Age: 55
End: 2025-03-12
Payer: COMMERCIAL

## 2025-03-20 ENCOUNTER — VIRTUAL VISIT (OUTPATIENT)
Dept: PSYCHIATRY | Facility: CLINIC | Age: 55
End: 2025-03-20
Attending: PSYCHOLOGIST
Payer: COMMERCIAL

## 2025-03-20 DIAGNOSIS — F33.41 MAJOR DEPRESSIVE DISORDER, RECURRENT EPISODE, IN PARTIAL REMISSION: Primary | ICD-10-CM

## 2025-03-20 DIAGNOSIS — F41.1 GAD (GENERALIZED ANXIETY DISORDER): ICD-10-CM

## 2025-03-20 NOTE — PROGRESS NOTES
[facility]    OUTPATIENT PSYCHOTHERAPY PROGRESS NOTE    Patient: Cheyenne Schulte (1970), MRN: [id number]    Diagnosis:   1. Major depressive disorder, recurrent episode, in partial remission    2. SERGIO (generalized anxiety disorder)           Provider: RACHEAL REESE MD  Date of Service:  3/20/25    Service: 13862 - Psychotherapy (with patient) - 53+ minutes  Start Time: 4:00  End Time: 4:55  In- Person at the [facility]: No    Extended Session (60+ minutes): No  Interactive Complexity: No  Crisis: No    Video Visit Details:   Telemedicine Visit: The patient's condition can be safely assessed and treated via synchronous audio and visual telemedicine encounter.  Reason for Video Visit: Patient has requested telehealth visit  Originating location (patient location):  [facility]   Location- Patient's home  Distant Site (provider location): HIPAA compliant [facility] Outpatient Setting: [facility]  Platform used for video visit:  Secure real time interactive audio and visual telecommunication system via [email or web address]    Individuals Present:   Client attended alone    Medication utilization: No changes to current psychiatric medication(s) ; managed by different provider      Subjective:  Today Janice reports doing well. Mood is stable and she has been recovering without complication from hip surgery. She is excited and future oriented. Has started back at work sooner than she expected, which is frustrating to her but she is coping well. Has been doing more advanced PT exercises without issue. Sleep has been better, gabapentin is helpful. Has decided to wait on switching antidepressant as mood has been bright. Will be unable to continue weight loss medication but is feeling hopeful about continued health journey.       Treatment interventions:    Psychotherapy Interventions - Supportive : Active listening,  Encouragement, praise, ego support, Identification of strengths, and Validation    Psychotherapy Interventions - Motivational Interviewing : Promote hope and positive expectations      Psychotherapy Interventions - Psychodynamic : Enhance insight via self-examination    Mental Status Exam    General: alert  and oriented adequately groomed  Behavior/Demeanor: cooperative and pleasant, with good  eye contact   Speech: regular rate and rhythm Intact. Normal volume, tory. No prosody.  Psychomotor: normal or unremarkable  Mood:  good  Affect: appropriate ; full range, congruent to mood and congruent to content  Thought Process/Associations: unremarkable  Thought Content:  Reports none;  no evidence of suicidal or violent ideation  Insight: good  Judgment: good    Assessment and Progress:     Behavioral Observations: Janice has been doing much better in terms of depression and anxiety symptoms. She is healing well from hip replacement surgery and is working hard doing all her PT exercises. Sleep has improved.  Continues to make positive changes with her diet and re examine her relationship with food.    Assessment: Janice is making adequate progress towards treatment goals. Has good insight into symptoms and has been reflecting on patterns of behavior that impact her mood. Her distress tolerance has continued to improve. No current safety concerns.  Overall chronic symptoms consistent with previous diagnoses of SERGIO. MDD in partial remission.    Plan:     Next therapy appointment has been scheduled in one month to continue work on treatment goals.     For homework, patient agreed to work on goals as listed below.    Treatment Plan          SYMPTOMS; PROBLEMS   MEASURABLE GOALS;     INTERVENTIONS;   GAINS MADE DISCHARGE CRITERIA   Emotional regulation and anxiety     -reflect on patterns which lead to overwhelm     -develop strategies for thought distraction when ruminating  -develop strategies to redirect thoughts when  having strong emotions   building on strengths  homework assignments    -has used ice packs and box breathing    -working on PLEASE skills for self care   marked symptom improvement   procrastination   complete tasks in timely manner and work on strategies to improve organization     homework assignments  -started using calendar to organize marked symptom improvement                 Date of most recent treatment plan: 1/9/25  Date next treatment plan due: 3 months    Patient agreed with the above treatment plan on 1/9/25.  Discussed case with supervisor who also agreed with the treatment plan. Unable to sign in person due to visit being conducted through telehealth.     Name of provider: RACHEAL REESE MD  Name of supervisor (if learner): Jourdan Marquis and Dr. Torres  Patient care discussed in supervision with above supervisor, who will review and sign note.

## 2025-04-09 ENCOUNTER — TRANSFERRED RECORDS (OUTPATIENT)
Dept: HEALTH INFORMATION MANAGEMENT | Facility: CLINIC | Age: 55
End: 2025-04-09
Payer: COMMERCIAL

## 2025-04-10 ENCOUNTER — VIRTUAL VISIT (OUTPATIENT)
Dept: PHARMACY | Facility: CLINIC | Age: 55
End: 2025-04-10
Payer: COMMERCIAL

## 2025-04-10 DIAGNOSIS — F41.1 GAD (GENERALIZED ANXIETY DISORDER): Primary | ICD-10-CM

## 2025-04-10 DIAGNOSIS — E66.9 OBESITY, UNSPECIFIED CLASS, UNSPECIFIED OBESITY TYPE, UNSPECIFIED WHETHER SERIOUS COMORBIDITY PRESENT: ICD-10-CM

## 2025-04-10 NOTE — PROGRESS NOTES
Medication Therapy Management (MTM) Encounter    ASSESSMENT:                            Medication Adherence/Access: No issues identified.    Weight Management:   Seeing how Janice has been off of semaglutide for over a month she should be restarted on semaglutide at a lower dose. Seeing as she has previously tolerated the 0.5 mg dose it would be appropriate to start there as her starting dose. PharmD also discussed future plans of switching to tirzepatide at the next appointment. PharmD will follow up early next week to assure the semaglutide has been sent. PharmD will also help Janice with process of applying for tirzepatide vials.    Mental Health/Anxiety:  Stable.    Being seen by psychiatry.     PLAN:                            Restart 0.5 mg of semaglutide   Pharmacist will reach out early next week to assure semaglutide has been sent  Will plan on going through attaining tirzepatide vials together at next visit    Follow-up: Return in about 4 weeks (around 5/8/2025).    SUBJECTIVE/OBJECTIVE:                          Janice Schulte is a 54 year old female seen for a follow-up visit.       Reason for visit: Medication Therapy Management.    Allergies/ADRs: Reviewed in chart  Past Medical History: Reviewed in chart  Tobacco: She reports that she has been smoking cigarettes. She has a 30 pack-year smoking history. She has never used smokeless tobacco.Nicotine/Tobacco Cessation Plan  Information offered: Patient not interested at this time    Alcohol: not currently using    Medication Adherence/Access: no issues reported.    Weight Management   -Semaglutide 1 mg once a week (has not taken since end of February)  Patient reports she was confused about why she was never sent her semaglutide dose in March. Janice had stopped taking semaglutide as instructed as she had hip surgery in February.   Nutrition/Eating Habits: Has not changed diet, focused mainly on portion sizes which has been easier as a result for Janice.  "  Exercise/Activity: Doing a lot of physical therapy, but not doing any resistance training currently. 3 30 minute walks per day.   Medications Tried/Failed:  None.     Per last conversation through [employer] Janice is also interested in switching to Tirzepatide after [facility] pharmacy no longer makes semaglutide.    Initial Consult Weight: 237 lbs Goal is 225 lbs by February and ideal weight is 170 lbs    200.7 pounds as of 2/6/2025    197 pounds as of 3/6/2025    193 pounds as of 4/10/2025    Janice states she was shocked to see her weight decrease after being off of semaglutide for almost 2 months.               Current weight today: 0 lbs 0 oz  Cumulative Weight Loss: -50 lb, -19% from baseline    Wt Readings from Last 4 Encounters:   01/30/25 206 lb 6.4 oz (93.6 kg)   01/07/25 207 lb (93.9 kg)   10/28/24 236 lb (107 kg)   06/25/24 234 lb 9.6 oz (106.4 kg)     Estimated body mass index is 33.93 kg/m  as calculated from the following:    Height as of 3/14/25: 5' 3.75\" (1.619 m).    Weight as of 3/14/25: 196 lb 1.6 oz (89 kg).        Mental Health/Anxiety  -Buspirone 30 mg twice a day  -Venlafaxine 75 mg + 150 mg (total of 225 mg once a day)    Janice has been on this combination of medication for over a year. Janice states that this combination of medication has been effective for her and has no complaints of side effects.    Janice is seeing a psychiatrist.    Today's Vitals: There were no vitals taken for this visit.  ----------------      I spent 12 minutes with this patient today. All changes were made via collaborative practice agreement with [facility].     A summary of these recommendations was sent via [employer].    Jose De Jesus Howard, PharmD    Telemedicine Visit Details  The patient's medications can be safely assessed via a telemedicine encounter.  Type of service:  Telephone visit  Originating Location (pt. Location): Home    Distant Location (provider location):  " On-site  Start Time:  8:31 AM  End Time: 8:43 AM     Medication Therapy Recommendations  Obesity, Class III, BMI 40-49.9 (morbid obesity) (H)   1 Current Medication: COMPOUNDED NON-CONTROLLED SUBSTANCE (CMPD RX) - PHARMACY TO MIX COMPOUNDED MEDICATION (Discontinued)   Current Medication Sig: Compounded semaglutide 1 mg subcutaneous injection once weekly due to shortage   Rationale: Dose too high - Dosage too high - Safety   Recommendation: Decrease Dose   Status: Accepted per CPA   Identified Date: 4/10/2025 Completed Date: 4/10/2025

## 2025-04-10 NOTE — PATIENT INSTRUCTIONS
"Recommendations from today's MTM visit:                                                         Restart 0.5 mg of semaglutide   Pharmacist will reach out early next week to assure semaglutide has been sent  Will plan on going through attaining tirzepatide vials together at next visit    Follow-up: Return in about 4 weeks (around 5/8/2025).    It was great speaking with you today.  I value your experience and would be very thankful for your time in providing feedback in our clinic survey. In the next few days, you may receive an email or text message from [employer] with a link to a survey related to your  clinical pharmacist.\"     To schedule another MTM appointment, please call the clinic directly or you may call the MTM scheduling line at [phone] or toll-free at [phone].     My Clinical Pharmacist's contact information:                                                      Please feel free to contact me with any questions or concerns you have.      Jose De Jesus Howard, PharmD   "

## 2025-04-10 NOTE — Clinical Note
Janice has been off of her semaglutide for over 1 month. I believe she should restart at a lower dose to prevent any sort of side effects. I started her at 0.5 mg of semaglutide and plan on switching her to tirzepatide when [facility] no longer compounds semaglutide.

## 2025-05-01 ENCOUNTER — VIRTUAL VISIT (OUTPATIENT)
Dept: PSYCHIATRY | Facility: CLINIC | Age: 55
End: 2025-05-01
Attending: PSYCHOLOGIST
Payer: COMMERCIAL

## 2025-05-01 DIAGNOSIS — F41.1 GAD (GENERALIZED ANXIETY DISORDER): ICD-10-CM

## 2025-05-01 DIAGNOSIS — F33.41 MAJOR DEPRESSIVE DISORDER, RECURRENT EPISODE, IN PARTIAL REMISSION: Primary | ICD-10-CM

## 2025-05-01 PROCEDURE — 90837 PSYTX W PT 60 MINUTES: CPT | Mod: 95

## 2025-05-01 NOTE — PROGRESS NOTES
[facility]    OUTPATIENT PSYCHOTHERAPY PROGRESS NOTE    Patient: Cheyenne Schulte (1970), MRN: [id number]    Diagnosis:   1. Major depressive disorder, recurrent episode, in partial remission    2. ESRGIO (generalized anxiety disorder)             Provider: RACHEAL REESE MD  Date of Service:  5/01/25    Service: 18938 - Psychotherapy (with patient) - 53+ minutes  Start Time: 4:01  End Time: 5:00  In- Person at the [facility]: No    Extended Session (60+ minutes): No  Interactive Complexity: No  Crisis: No    Video Visit Details:   Telemedicine Visit: The patient's condition can be safely assessed and treated via synchronous audio and visual telemedicine encounter.  Reason for Video Visit: Patient has requested telehealth visit  Originating location (patient location):  [facility]   Location- Patient's home  Distant Site (provider location): HIPAA compliant location - Provider Remote Setting- Home Office  Platform used for video visit:  Secure real time interactive audio and visual telecommunication system via [employer]    Individuals Present:   Client attended alone    Medication utilization: No changes to current psychiatric medication(s) ; managed by different provider      Subjective:  Today Janice reports overall doing well, but has had some stressful events occur since last seen. Had conflict with her best friend and this has resulted in likely ending of friendship. This has been especially distressing as this has been a close friend for ~30 years. Had tried to support friend through their mental health crisis, but had to set boundaries as relationship was becoming too hostile. Mood and sleep were poor last week due to stress over this, but has been better this week. Has been recovering well from hip surgery and has had better energy levels overall. Has been more physically active and maintained her weight loss.        Treatment interventions:    Psychotherapy Interventions - Supportive : Active listening, Identification of strengths, and Validation    Psychotherapy Interventions - Motivational Interviewing : Promote hope and positive expectations      Psychotherapy Interventions - Psychodynamic : Explore emotions, especially contradicting/confusing ones and Encourage nuanced understanding of interpersonal relationships    Mental Status Exam    General: alert  and oriented adequately groomed  Behavior/Demeanor: cooperative and pleasant, with good  eye contact   Speech: regular rate and rhythm Intact. Normal volume, tory. No prosody.  Psychomotor: normal or unremarkable  Mood:  okay  Affect: appropriate ; full range, congruent to mood and congruent to content  Thought Process/Associations: unremarkable  Thought Content:  Reports none;  no evidence of suicidal or violent ideation  Insight: good  Judgment: good    Assessment and Progress:     Behavioral Observations: Janice has been doing much better in terms of depression and anxiety symptoms. She is healing well from hip replacement surgery and has been more physically active. Continues to make positive changes with her diet and is maintaining her weight loss. Janice has been setting healthy boundaries in relationships.    Assessment: Janice is making adequate progress towards treatment goals. Has good insight into symptoms and has been reflecting on patterns of behavior that impact her mood. Her distress tolerance has continued to improve, as evidenced by her ability to deal with interpersonal conflict. No current safety concerns.  Overall chronic symptoms consistent with previous diagnoses of SERGIO and MDD in partial remission.    Plan:     Next therapy appointment has been scheduled in two weeks to continue work on treatment goals.     For homework, patient agreed to work on goals as listed below.    Treatment Plan          SYMPTOMS; PROBLEMS   MEASURABLE GOALS;      INTERVENTIONS;   GAINS MADE DISCHARGE CRITERIA   Emotional regulation and anxiety     -reflect on patterns which lead to overwhelm     -develop strategies for thought distraction when ruminating  -develop strategies to redirect thoughts when having strong emotions   building on strengths  homework assignments    -has used ice packs and box breathing    -working on PLEASE skills for self care   marked symptom improvement   procrastination   complete tasks in timely manner and work on strategies to improve organization     homework assignments  -started using calendar to organize marked symptom improvement                 Date of most recent treatment plan: 1/9/25  Date next treatment plan due: 3 months    Patient agreed with the above treatment plan on 1/9/25.  Discussed case with supervisor who also agreed with the treatment plan. Unable to sign in person due to visit being conducted through telehealth.     Name of provider: RACHEAL REESE MD  Name of supervisor (if learner): Jourdan Marquis and Dr. Torres  Patient care discussed in supervision with above supervisor, who will review and sign note.

## 2025-05-08 ENCOUNTER — VIRTUAL VISIT (OUTPATIENT)
Dept: PHARMACY | Facility: CLINIC | Age: 55
End: 2025-05-08
Payer: COMMERCIAL

## 2025-05-08 DIAGNOSIS — E66.9 OBESITY, UNSPECIFIED CLASS, UNSPECIFIED OBESITY TYPE, UNSPECIFIED WHETHER SERIOUS COMORBIDITY PRESENT: Primary | ICD-10-CM

## 2025-05-08 NOTE — PATIENT INSTRUCTIONS
"Recommendations from today's MTM visit:                                                         Stop semaglutide   Continue other medications as prescribed  Focus on diet and exercise changes. (30 minutes of exercise 5 times a week)     Follow-up: As needed    It was great speaking with you today.  I value your experience and would be very thankful for your time in providing feedback in our clinic survey. In the next few days, you may receive an email or text message from [facility] with a link to a survey related to your  clinical pharmacist.\"     To schedule another MTM appointment, please call the clinic directly or you may call the MTM scheduling line at [phone] or toll-free at [phone].     My Clinical Pharmacist's contact information:                                                      Please feel free to contact me with any questions or concerns you have.      Jose De Jesus Howard, PharmD  "

## 2025-05-08 NOTE — Clinical Note
Met with Janice today and she has stopped her GLP1 due to side effects. She would like to focus on diet and exercise and will follow up with MTM as needed in the future

## 2025-05-08 NOTE — PROGRESS NOTES
Medication Therapy Management (MTM) Encounter    ASSESSMENT:                            Medication Adherence/Access: No issues identified.    Weight management:   Janice reports having nausea associated with semaglutide and is not interested in any additional medications for weight loss at the moment. Janice states that she will focus on primarily diet and on exercise. PharmD agreed with this plan and offered as needed follow up to discuss steps forward if medications are necessary for weight loss    PLAN:                            Stop semaglutide   Continue other medications as prescribed  Focus on diet and exercise changes. (30 minutes of exercise 5 times a week)    Follow-up: As needed    SUBJECTIVE/OBJECTIVE:                          Janice Schulte is a 54 year old female seen for a follow-up visit.       Reason for visit: Medication therapy management.    Allergies/ADRs: Reviewed in chart  Past Medical History: Reviewed in chart  Tobacco: She reports that she has been smoking cigarettes. She has a 30 pack-year smoking history. She has never used smokeless tobacco.Nicotine/Tobacco Cessation Plan  Information offered: Patient not interested at this time    Alcohol: not currently using    Medication Adherence/Access: See below for considerations    Weight Management   -Semaglutide 0.5 mg once a week    Reports some nausea restarting semaglutide and has not been able to keep anything down. Has been feeling very sick. Reports that her weight has been stable since her surgery.     Nutrition/Eating Habits: Has not been able to eat a lot of food due to semaglutide.  Exercise/Activity: Trying to get into the habit of exercising more.  Medications Tried/Failed:  None.     Initial Consult Weight: 237 lbs Goal weight is 170 lbs    200.7 pounds as of 2/6/2025    197 pounds as of 3/6/2025    193 pounds as of 4/10/2025. 189 pounds as of today, but weight loss is due to her not eating.                   Today's Vitals: There were  no vitals taken for this visit.  ----------------      I spent 5 minutes with this patient today. I offer these suggestions for consideration to Dr. Irizarry.     A summary of these recommendations was sent via [employer].    Jose De Jesus Howard, Shelley    Telemedicine Visit Details  The patient's medications can be safely assessed via a telemedicine encounter.  Type of service:  Telephone visit  Originating Location (pt. Location): Home    Distant Location (provider location):  On-site  Start Time: 9:30 AM  End Time: 9:35 AM     Medication Therapy Recommendations  No medication therapy recommendations to display

## 2025-05-14 ENCOUNTER — ALLIED HEALTH/NURSE VISIT (OUTPATIENT)
Dept: FAMILY MEDICINE | Facility: CLINIC | Age: 55
End: 2025-05-14
Payer: COMMERCIAL

## 2025-05-14 DIAGNOSIS — Z30.42 DEPO-PROVERA CONTRACEPTIVE STATUS: Primary | ICD-10-CM

## 2025-05-14 PROCEDURE — 99207 PR NO CHARGE NURSE ONLY: CPT

## 2025-05-14 PROCEDURE — 96372 THER/PROPH/DIAG INJ SC/IM: CPT | Performed by: FAMILY MEDICINE

## 2025-05-14 RX ADMIN — MEDROXYPROGESTERONE ACETATE 150 MG: 150 INJECTION, SUSPENSION INTRAMUSCULAR at 11:19

## 2025-05-14 NOTE — PROGRESS NOTES

## 2025-05-22 ENCOUNTER — VIRTUAL VISIT (OUTPATIENT)
Dept: PSYCHIATRY | Facility: CLINIC | Age: 55
End: 2025-05-22
Attending: PSYCHOLOGIST
Payer: COMMERCIAL

## 2025-05-22 DIAGNOSIS — F33.41 MAJOR DEPRESSIVE DISORDER, RECURRENT EPISODE, IN PARTIAL REMISSION: Primary | ICD-10-CM

## 2025-05-22 DIAGNOSIS — F41.1 GAD (GENERALIZED ANXIETY DISORDER): ICD-10-CM

## 2025-05-22 PROCEDURE — 90837 PSYTX W PT 60 MINUTES: CPT | Mod: 95

## 2025-05-22 NOTE — PROGRESS NOTES
[facility]    OUTPATIENT PSYCHOTHERAPY PROGRESS NOTE    Patient: Cheyenne Schulte (1970), MRN: [id number]    Diagnosis:   1. Major depressive disorder, recurrent episode, in partial remission    2. SERGIO (generalized anxiety disorder)               Provider: RACHEAL REESE MD  Date of Service:  5/22/25    Service: [id number] - Psychotherapy (with patient) - 53+ minutes  Start Time: 4:00  End Time: 4:54  In- Person at the [place] [facility]: No    Extended Session (60+ minutes): No  Interactive Complexity: No  Crisis: No    Video Visit Details:   Telemedicine Visit: The patient's condition can be safely assessed and treated via synchronous audio and visual telemedicine encounter.  Reason for Video Visit: Patient has requested telehealth visit  Originating location (patient location):  [facility]   Location- Patient's home  Distant Site (provider location): HIPAA compliant location - Provider Remote Setting- Home Office  Platform used for video visit:  Secure real time interactive audio and visual telecommunication system via [employer]    Individuals Present:   Client attended alone    Medication utilization: No changes to current psychiatric medication(s) ; managed by different provider      Subjective:  Today Janice reports overall doing well. Has been sleeping better and mood is stable. Dealt with the conflict with her friend in a healthy way that she feels good about. Has been spending quality times with her children and partner. Reached milestone of 50 lb weight loss. Has decided to stop weight loss medication for now. Is feeling better with nutrition and move movement. Feels good with current mental health medication regimen. Does plan to talk with prescriber about options for ADHD medications as these had been helpful in the past.    Treatment interventions:    Psychotherapy Interventions - Supportive : Active listening, Identification of  strengths, and Validation    Psychotherapy Interventions - Motivational Interviewing : Promote hope and positive expectations      Psychotherapy Interventions - Psychodynamic : Enhance insight via self-examination    Mental Status Exam    General: alert  and oriented adequately groomed  Behavior/Demeanor: cooperative and pleasant, with good  eye contact   Speech: regular rate and rhythm Intact. Normal volume, tory. No prosody.  Psychomotor: normal or unremarkable  Mood: great  Affect: appropriate ; full range, congruent to mood and congruent to content  Thought Process/Associations: unremarkable  Thought Content:  Reports none;  no evidence of suicidal or violent ideation  Insight: good  Judgment: good    Assessment and Progress:     Behavioral Observations: Janice has been doing much better in terms of depression and anxiety symptoms. She is healing well from hip replacement surgery and has been more physically active. Continues to make positive changes with her diet and is maintaining her weight loss. Janice has been setting healthy boundaries in relationships.    Assessment: Janice is making adequate progress towards treatment goals. Has made great strides in her emotional regulation, so much so that today opting to take that off her treatment plan as a goal. Her distress tolerance has continued to improve, as evidenced by her ability to deal with interpersonal conflict. Continues to struggle with some ADHD symptoms. No current safety concerns.  Overall chronic symptoms consistent with previous diagnoses of SERGIO and MDD in partial remission.    Plan:     Next therapy appointment has been scheduled in three weeks to continue work on treatment goals.     For homework, patient agreed to work on goals as listed below. They will also brainstorm other specific goals to add to treatment plan.    Treatment Plan          SYMPTOMS; PROBLEMS   MEASURABLE GOALS;     INTERVENTIONS;   GAINS MADE DISCHARGE CRITERIA    Procrastination    General ADHD symptoms    complete tasks in timely manner, reduce feeling overwhelmed/ improve decision making skills, and work on strategies to improve organization    - setting alarms for appointments    -improved functioning at work    -talk to prescriber about potentially starting ADHD medication   homework assignments    -started using calendar to organize   marked symptom improvement                 Date of most recent treatment plan: 5/22/25  Date next treatment plan due: 3 months    Patient agreed with the above treatment plan on 5/22/25.  Discussed case with supervisor who also agreed with the treatment plan. Unable to sign in person due to visit being conducted through telehealth.     Name of provider: RACHEAL REESE MD  Name of supervisor (if learner): Jourdan Marquis and Dr. Torres  Patient care discussed in supervision with above supervisor, who will review and sign note.

## 2025-05-26 ENCOUNTER — PATIENT OUTREACH (OUTPATIENT)
Dept: CARE COORDINATION | Facility: CLINIC | Age: 55
End: 2025-05-26
Payer: COMMERCIAL

## 2025-06-09 ENCOUNTER — PATIENT OUTREACH (OUTPATIENT)
Dept: CARE COORDINATION | Facility: CLINIC | Age: 55
End: 2025-06-09
Payer: COMMERCIAL

## 2025-06-12 ENCOUNTER — VIRTUAL VISIT (OUTPATIENT)
Dept: PSYCHIATRY | Facility: CLINIC | Age: 55
End: 2025-06-12
Attending: PSYCHOLOGIST
Payer: COMMERCIAL

## 2025-06-12 DIAGNOSIS — F41.1 GAD (GENERALIZED ANXIETY DISORDER): Primary | ICD-10-CM

## 2025-06-12 DIAGNOSIS — F33.41 MAJOR DEPRESSIVE DISORDER, RECURRENT EPISODE, IN PARTIAL REMISSION: ICD-10-CM

## 2025-06-12 PROCEDURE — 90837 PSYTX W PT 60 MINUTES: CPT | Mod: 95

## 2025-06-12 NOTE — PROGRESS NOTES
[facility]    OUTPATIENT PSYCHOTHERAPY PROGRESS NOTE    Patient: Cheyenne Schulte (1970), MRN: [id number]    Diagnosis:   1. SERGIO (generalized anxiety disorder)    2. Major depressive disorder, recurrent episode, in partial remission        Provider: RACHEAL REESE MD  Date of Service:  6/12/25    Service: 90797 - Psychotherapy (with patient) - 53+ minutes  Start Time: 4:00  End Time: 4:57  In- Person at the [place] [facility]: No    Extended Session (60+ minutes): No  Interactive Complexity: No  Crisis: No    Video Visit Details:   Telemedicine Visit: The patient's condition can be safely assessed and treated via synchronous audio and visual telemedicine encounter.  Reason for Video Visit: Patient has requested telehealth visit  Originating location (patient location):  [facility]   Location- Patient's home  Distant Site (provider location): HIPAA compliant location - Provider Remote Setting- Home Office  Platform used for video visit:  Secure real time interactive audio and visual telecommunication system via [employer]    Individuals Present:   Client attended alone    Medication utilization: No changes to current psychiatric medication(s) ; managed by different provider      Subjective:  Today Janice reports overall doing well. Sleep has been better lately, rarely needing her prn at bedtime medication. Has been more active going on walks each day. Energy levels better overall. Continues to maintain weight loss and work on positive relationship with food.  Has been spending quality time with her children and partner. Scheduled an appt for ADHD assessment the end of this month.    Treatment interventions:    Psychotherapy Interventions - Supportive : Active listening, Encouragement, praise, ego support, Identification of strengths, and Validation    Psychotherapy Interventions - Motivational Interviewing : Assess stage of change  and  Promote hope and positive expectations      Psychotherapy Interventions - Psychodynamic : Enhance insight via self-examination and Encourage nuanced understanding of interpersonal relationships    Mental Status Exam    General: alert  and oriented adequately groomed  Behavior/Demeanor: cooperative and pleasant, with good  eye contact   Speech: regular rate and rhythm Intact. Normal volume, tory. No prosody.  Psychomotor: normal or unremarkable  Mood: great  Affect: appropriate ; full range, congruent to mood and congruent to content  Thought Process/Associations: unremarkable  Thought Content:  Reports none;  no evidence of suicidal or violent ideation  Insight: good  Judgment: good    Assessment and Progress:     Behavioral Observations: Janice has been doing much better in terms of depression and anxiety symptoms. Sleep and energy levels are improved. She has continued to do well following hip replacement surgery. Continues to make positive changes with her diet and is maintaining her weight loss. Janice has also been setting healthy boundaries in relationships.    Assessment: Janice is making adequate progress towards treatment goals. Has made great strides in her emotional regulation, so much so that we had removed this from her treatment plan as a goal. Her distress tolerance has continued to improve, as evidenced by her ability to deal with interpersonal conflict. Continues to struggle with some ADHD symptoms. Contemplating if she would benefit from CBT over the next few months. No current safety concerns.  Overall chronic symptoms consistent with previous diagnoses of SERGIO and MDD in partial remission.    Plan:     Next therapy appointment has been scheduled in two weeks to continue work on treatment goals.     For homework, patient agreed to work on goals as listed below. They will also brainstorm other specific goals to add to treatment plan.    Treatment Plan          SYMPTOMS; PROBLEMS   MEASURABLE GOALS;      INTERVENTIONS;   GAINS MADE DISCHARGE CRITERIA   Procrastination    General ADHD symptoms    complete tasks in timely manner, reduce feeling overwhelmed/ improve decision making skills, and work on strategies to improve organization    - setting alarms for appointments    -improved functioning at work    -consider and make decision about CBT for ADHD   homework assignments    -started using calendar to organize    -scheduled ADHD assessment marked symptom improvement                 Date of most recent treatment plan: 5/22/25  Date next treatment plan due: 3 months    Patient agreed with the above treatment plan on 5/22/25.  Discussed case with supervisor who also agreed with the treatment plan. Unable to sign in person due to visit being conducted through telehealth.     Name of provider: RACHEAL REESE MD  Name of supervisor (if learner): Jourdan Marquis and Dr. Torres  Patient care discussed in supervision with above supervisor, who will review and sign note.

## 2025-06-26 ENCOUNTER — VIRTUAL VISIT (OUTPATIENT)
Dept: PSYCHIATRY | Facility: CLINIC | Age: 55
End: 2025-06-26
Attending: PSYCHOLOGIST
Payer: COMMERCIAL

## 2025-06-26 DIAGNOSIS — F33.41 MAJOR DEPRESSIVE DISORDER, RECURRENT EPISODE, IN PARTIAL REMISSION: ICD-10-CM

## 2025-06-26 DIAGNOSIS — F41.1 GAD (GENERALIZED ANXIETY DISORDER): Primary | ICD-10-CM

## 2025-06-26 PROCEDURE — 90834 PSYTX W PT 45 MINUTES: CPT | Mod: 95

## 2025-06-26 NOTE — PROGRESS NOTES
[facility]    OUTPATIENT PSYCHOTHERAPY PROGRESS NOTE    Patient: Cheyenne Schulte (1970), MRN: [id number]    Diagnosis:   1. SERGIO (generalized anxiety disorder)    2. Major depressive disorder, recurrent episode, in partial remission          Provider: RACHEAL REESE MD  Date of Service:  6/26/25    Service: 91206 - Psychotherapy (with patient) - 38-52 minutes  Start Time: 4:01 End Time: 4:40  In- Person at the [place] [facility]: No    Extended Session (60+ minutes): No  Interactive Complexity: No  Crisis: No    Video Visit Details:   Telemedicine Visit: The patient's condition can be safely assessed and treated via synchronous audio and visual telemedicine encounter.  Reason for Video Visit: Patient has requested telehealth visit  Originating location (patient location):  [facility]   Location- Patient's home  Distant Site (provider location): HIPAA compliant location - Provider Remote Setting- Home Office  Platform used for video visit:  Secure real time interactive audio and visual telecommunication system via [employer]    Individuals Present:   Client attended alone    Medication utilization: No changes to current psychiatric medication(s) ; managed by different provider      Subjective:  Today Janice reports overall doing very well. Mood has been good and frustration tolerance has been higher. Continues to maintain weight loss and work on positive relationship with food.  Has been spending quality time with her children and partner. Has ADHD assessment next Monday and has been working on prep materials.     Treatment interventions:    Psychotherapy Interventions - Supportive : Active listening and Encouragement, praise, ego support    Psychotherapy Interventions - Motivational Interviewing : Promote hope and positive expectations      Psychotherapy Interventions - Psychodynamic : Enhance insight via self-examination and Explore  emotions, especially contradicting/confusing ones    Mental Status Exam    General: alert  and oriented adequately groomed  Behavior/Demeanor: cooperative and pleasant, with good  eye contact   Speech: regular rate and rhythm Intact. Normal volume, tory. No prosody.  Psychomotor: normal or unremarkable  Mood: good  Affect: appropriate ; full range, congruent to mood and congruent to content  Thought Process/Associations: unremarkable  Thought Content:  Reports none;  no evidence of suicidal or violent ideation  Insight: good  Judgment: good    Assessment and Progress:     Behavioral Observations: Janice has been doing much better with both depression and anxiety symptoms.  She has continued to do well following hip replacement surgery. Continues to make positive changes with her diet and is maintaining her weight loss. Janice has also been setting healthy boundaries in relationships.    Assessment: Janice is making adequate progress towards treatment goals. Has not had issues with emotional regulation for a few months. Her distress tolerance has continued to improve, as evidenced by her ability to deal with interpersonal conflict. Continues to struggle with some ADHD symptoms, is scheduled for upcoming assessmen. Contemplating if she would benefit from CBT over the next few months. No current safety concerns.  Overall chronic symptoms consistent with previous diagnoses of SERGIO and MDD in partial remission.    Plan:     Next therapy appointment has been scheduled in two weeks to continue work on treatment goals.     For homework, patient agreed to work on goals as listed below. They will also brainstorm other specific goals to add to treatment plan.    Treatment Plan          SYMPTOMS; PROBLEMS   MEASURABLE GOALS;     INTERVENTIONS;   GAINS MADE DISCHARGE CRITERIA   Procrastination    General ADHD symptoms    complete tasks in timely manner, reduce feeling overwhelmed/ improve decision making skills, and work on  strategies to improve organization    - setting alarms for appointments    -improved functioning at work    -consider and make decision about CBT for ADHD   homework assignments    -started using calendar to organize    -scheduled ADHD assessment marked symptom improvement                 Date of most recent treatment plan: 5/22/25  Date next treatment plan due: 3 months    Patient agreed with the above treatment plan on 5/22/25.  Discussed case with supervisor who also agreed with the treatment plan. Unable to sign in person due to visit being conducted through telehealth.     Name of provider: RACHEAL REESE MD  Name of supervisor (if learner): Jourdan Mraquis and Dr. Torres  Patient care discussed in supervision with above supervisor, who will review and sign note.

## 2025-07-08 ASSESSMENT — PATIENT HEALTH QUESTIONNAIRE - PHQ9
10. IF YOU CHECKED OFF ANY PROBLEMS, HOW DIFFICULT HAVE THESE PROBLEMS MADE IT FOR YOU TO DO YOUR WORK, TAKE CARE OF THINGS AT HOME, OR GET ALONG WITH OTHER PEOPLE: NOT DIFFICULT AT ALL
SUM OF ALL RESPONSES TO PHQ QUESTIONS 1-9: 2
SUM OF ALL RESPONSES TO PHQ QUESTIONS 1-9: 2

## 2025-07-09 ENCOUNTER — VIRTUAL VISIT (OUTPATIENT)
Dept: PSYCHIATRY | Facility: CLINIC | Age: 55
End: 2025-07-09
Attending: PSYCHOLOGIST
Payer: COMMERCIAL

## 2025-07-09 DIAGNOSIS — F90.0 ATTENTION DEFICIT HYPERACTIVITY DISORDER (ADHD), PREDOMINANTLY INATTENTIVE TYPE: ICD-10-CM

## 2025-07-09 DIAGNOSIS — F33.41 MAJOR DEPRESSIVE DISORDER, RECURRENT EPISODE, IN PARTIAL REMISSION: ICD-10-CM

## 2025-07-09 DIAGNOSIS — F41.1 GAD (GENERALIZED ANXIETY DISORDER): Primary | ICD-10-CM

## 2025-07-10 NOTE — PROGRESS NOTES
[facility]  [address], [address]  [place] [address]  [phone]     PSYCHIATRIC DIAGNOSTIC ASSESSMENT ENCOUNTER     PATIENT     Name: Cheyenne Schulte   Preferred Name: Janice  YOB: 1970     SERVICES PROVIDED     Date of Service: 7/9/2025  Time of Service: 9:13 AM to 10:22 AM (69 minutes)  Type of Service: 64935 Diagnostic Evaluation  Service Provider: Jh Cuello, PhD,       REASON FOR REFERRAL     Janice is seeking an ADHD evaluation. This diagnostic assessment will clarify diagnoses, clinical concerns, and aid in treatment planning. Evaluation included medical record review, clinical interview of patient, behavioral observations, and assessment measures where available.       TELEMEDICINE ENCOUNTER METHODS      Telemedicine clinical interview was conducted due to patient preference. Janice agreed to receiving services via telemedicine technology. The patient's condition was safely assessed and treated via synchronous audio and visual telemedicine encounter. A secure real time interactive audio and visual telecommunication system (videoconference via [employer]) was used for the visit.     Patient Visit Location: Home in [place]   Present For Encounter: Janice   Provider Visit Location: HIPAA compliant location within [facility]      BACKGROUND INFORMATION       Janice is  from her  of 20 years.  They share custody of their 2 children.  Her highest education level is attending some college.     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 She has a new partner ([facility]).       Janice has a history of involvement in behavioral health care.  She is receiving psychiatric care from ALTHEA Ramey CNP and participates in psychotherapy with Dr. Mari Foster (supervised by Dr. Brianna Torres).  Review of medical records reveals diagnoses of generalized anxiety disorder and recurrent  "major depressive disorder in partial remission.     It is noteworthy that Janice was evaluated 15 years ago for ADHD and it was not diagnosed, based primarily on good performance with neuropsychological tests.      Psychiatric Symptoms and Concerns     The following reflect pertinent findings and summary of current symptoms and concerns for Janice.       ADHD   Inattention: Reported the following 6 of 9 DSM-V symptoms as often to very often -careless mistakes, poor follow-through, difficulty organizing/planning, avoids/dislikes tasks requiring sustained mental effort, easily distracted, and forgetful/trouble with sense of time; also reported more generally that she struggles with procrastination  Hyperactivity/Impulsivity: Reported the following 1 of 9 DSM-V symptoms as often to very often -difficulty waiting turn; also reported more generally she struggles with being impatient  Age of Onset: Reported not being entirely sure about childhood symptoms because \"I cannot remember much about childhood\"    FUNCTIONAL IMPAIRMENT RELATED TO ADHD  Functional Impairment Historically in School: Reported the following as moderate to severe problems related to endorsed ADHD symptoms -noted did well academically while growing up, in part due to the support and scaffolding her mother provided; further noted struggling more in middle school and high school especially with procrastination and being late on assignments, and also skipped classes sometimes in high school; went to a private college and then [employer], and did not do well, due to low motivation, and poor class attendance; later reentered [employer] college and did better, but ultimately did not finish college due to financial considerations  Functional Impairment Currently at Work: Reported the following as moderate to severe problems related to endorsed ADHD symptoms - noted does her work satisfactorily, but struggles with procrastination, making careless " "mistakes, and has a hard time learning new tasks and new technologies related to her job  Functional Impairment Currently at Home: Reported the following as moderate to severe problems related to endorsed ADHD symptoms - noted generally keeps up her home, but has several \"unfinished projects\" at home   Functional Impairment Currently within Relationships: Reported the following as moderate to severe problems related to endorsed ADHD symptoms - noted had a good marriage, but gradually she and her  drifted apart; noted current relationship with partner Chase is satisfactory; noted relationships with her children are satisfactory     OTHER    Depression: Reported having depression \"forever\" including having been on different antidepressant medications since her 20s; noted her depression used to be severe, but it is mild and manageable at the current time, and does not include currently anhedonia, hopelessness, or suicidal ideation  Anxiety: Reported having \"a lot of anxiety\" throughout her life including worrying, ruminating, fearful thoughts, and some physical tension when nervous; noted triggers include stress at work, going to medical appointments, and sometimes her children  Post-Traumatic Stress: None reported    Sleep Problems: Reported occasional difficulty with sleep onset, and sometimes has racing and catastrophic thoughts while laying in bed, noted feeling fatigued during the day a lot; noted that gabapentin has helped her sleep better recently  Alcohol Abuse: None reported; drinks several beers a week, but does not consider it a problem  Drug Abuse: None reported; uses cannabis occasionally, but does not consider it a problem  Psychotic: None reported or observed  Suicidal Thoughts: None reported currently; reported had passive suicidal thoughts in the past, but she would never follow through because of her children     MEDICAL  Physical Health Problems: Reported getting a new hip so previous hip " "related pain has declined; noted has lost about 50 pounds with diet, being more active, and unspecified \"injections\"    History     The following reflect pertinent findings of history identified for Janice.       Developmental Delays: Reported was born 4 weeks premature and that her mother smoked during the pregnancy; indicated meeting developmental milestones within normal limits for walking, talking, and socializing  Family Problems: Reported father was an alcoholic and that her parents  when she was young; her mother remarried and she had a \"wonderful\" stepfather  Adversity/Trauma: None reported   Educational Problems: None reported   Mental Health Problems: Reported being diagnosed and receiving medication treatment for depression dating back to her 20s; has been involved in psychiatry and therapy off and on since  Family Mental Health History: Reported both of her children had ADHD, her father is an alcoholic and alcoholism runs on the maternal side of the family; noted a maternal uncle and the 16-year-old son of her stepbrother committed suicide, and then her stepbrother developed depression and alcoholism      QUESTIONNAIRE RESULTS      Janice completed the following assessment questionnaires, and where indicated, asked another close informant to complete assessment questionnaires. The italicized sub-scales below are significant.      Data    CHILDHOOD ADHD     Gavin ADHD Rating Scale-IV: Self Report of Childhood  Note: 6 of 9 and/or > 96th % is significant  Section 1 Inattention:   Raw Score (24) 96%   Symptom Count (6) 96%   Section 2 Hyperactivity-Impulsivity:   Raw Score (12) <75%   Symptom Count (0) <75%   Total Raw: (36) 88%   Total Symptom Count: (6) 90%     Areas of Impairment: 3/3: home, school, social relationships     Gavin ADHD Rating Scale-IV: Other Report of Childhood (partner)  Note: 6 of 9 is significant    Section 1 Inattention:  Raw Score (12)  Symptom Count (0)  Section 2 " "Hyperactivity-Impulsivity:  Raw Score (16)  Symptom Count (1)  Total Raw: (28)  Total Symptom Count: (1)      Areas of Impairment: 2/3: home, social relationships  (Same answers as current report)    ADULTHOOD ADHD    Gavin ADHD Rating Scale-IV: Self Report of Adulthood  Note: 5 of 9 and/or > 96th % is significant  Section 1 Inattention:   Raw Score (23) 97%   Symptom Count (6) 98%   Section 2 Hyperactivity:   Raw Score (6) <75%   Section 3 Impulsivity:   Raw Score (8) 91%   2/3 Hyper/Impulse Symptom Count (1) 86%   Total Raw: (37) 94%   Total Symptom Count: (7) 96%   Age at onset: \"10?\"       Areas of Impairment: 3/4: home, work, social relationships     Gavin ADHD Rating Scale-IV: Other Report of Adulthood (partner)   Note: 5 of 9 and/or > 96th % is significant    Section 1 Inattention:  Raw Score (12)  Symptom Count (0)  Section 2 Hyperactivity:  Raw Score (7)  Section 3 Impulsivity:  Raw Score (9)  Symptom Count Hyper/Impulsive: (1)  Total Raw: (28)  Total Symptom Count: (1)  Age at onset: don't know      Areas of Impairment: -/4: -  (Same answers as child report)    Gavin Deficits in Executive Functioning Scale: Self Report of Adulthood  Note: > 96th % is significant           Self-Management to Time (58) 95%   Self-Organization/Problem Solving (46) 77%   Self-Restraint (39) 93%   Self-Motivation (18) 84%   Self-Regulation of Emotions (43) 99+%   EF Symptom Count total: (36)   Total EF Summary Score: (204) 94%   ADHD-EF Index: (25)  94%     Gavin Functional Impairment Scale: Self Report of Adulthood  Note: > 96th % is significant   Home-family (2) <75%   Home-chores (9) +9%   Work (6) 94%   Social-strangers (0) <50%   Social-friends (0) <50%   Community Activities (0) <50%   Education (99) -%   Marriage/Cohabitating/Dating (6) 90%   Money Management (2) <75%   Driving (0) <50%   Sexual Relations (0) <50%   Daily Responsibilities (0) <50%   Self Care (3) <84%   Health maintenance (3) <75%   Childrearing " (2) <84%     OTHER ADULTHOOD      Patient Health Questionnaire 9  Total Score: 2 (no depression)      Interpretation      Self-report of childhood ADHD symptoms are clinically significant for difficulties with inattention, but not for hyperactivity/impulsivity.  The other report of childhood ADHD symptoms was completed by her recent partner and was not clinically significant or even suggestive of ADHD concerns in any way.  The other report of childhood symptoms are not valid since her partner did not know her in childhood (lack of observation or knowledge) and the response pattern was identical to the other report of current ADHD symptoms (highly atypical response pattern across two questionnaires).     Self-report of adulthood ADHD symptoms are clinically significant for difficulties with inattention. The other report of adulthood ADHD symptoms was completed by her recent partner and was not clinically significant or even suggestive of ADHD concerns in any way. It is not sure if the other report of adulthood symptoms are valid because, although he has known Janice for 5 years, they have not lived together so there may be a lack of observation or knowledge.      Self-report of executive function problems is clinically significant for difficulties with regulation of emotions.  Additionally, scores for difficulties with time management, self restraint, and overall executive functioning are highly elevated. This pattern is consistent with ADHD.    There is endorsement across the above ADHD rating scales, as well as the self-report functional impairment scale, of significant functional problems related to ADHD in multiple domains across the lifespan including home, work, and social relationships.    Finally, there is endorsement of no depression and for Janice currently on the PHQ9.     MENTAL HEALTH AND WELLBEING SCREENING        Janice participated in making self-ratings for perceived level of mastery in  Health  Practices that are aligned with Four Worlds of Mental Health and Wellbeing. Ratings range from 0 = Not Good at This to 10 = Great at This, and are recorded below.         Brain Capacity and Health  Rest Health Practice: I am well rested and energized -5  Hydrate Health Practice: I drink enough water every day and stay hydrated -8  Nourish Health Practice: I eat healthy -5  Move Health Practice: I am physically active -7    Daily and Relationship Functioning   Activate Health Practice: I am responsible and successful at home, work, school -3.5  Express Health Practice: I listen to others and express myself well -8  Connect Health Practice: I have positive, healthy and meaningful relationships -10    Emotions  Soothe Health Practice: I am calm and manage my emotions -2  Think Health Practice: I have positive, hopeful and helpful thoughts -3  Observe Health Practice: I am thankful, accepting, and live in the moment -6    What Matters in Life  Seek Health Practice: I do things that give me meaning and purpose -8  Believe Health Practice: My values and beliefs guide how I am living -9     The ratings were reviewed with Janice regarding current strengths and opportunities within health practices that are related to mental health and wellbeing.  It was noted that she could work on these things through self-care and/or within the context of ongoing psychotherapy.     MENTAL STATUS EXAM      Janice was observed for the following indications of mental status.      Appearance/Behavior: Adequate grooming, appropriate attire, good eye contact, calm, cooperative, no abnormal movements   Speech: Rate, volume and tone appropriate; no push or pressure; no rapid speech     Mood/Affect: Calm, affect congruent to situation    Thought Process/Content: Coherent, linear; no reports or evidence of auditory hallucinations; no reports or evidence of visual hallucinations   Thought Associations: Logical, no looseness of associations, no flight  of ideas, no tangentially, no circumstantiality   Insight/Judgment: Adequate insight into condition and need for treatment; judgement not impaired   Orientation: Alert and oriented to person place, time, and circumstance   Recent and Past Memory: Good   Attention Span/Concentration: Good     Language: English with estimated vocabulary to be average or above    Fund of Knowledge: Estimated to be average or above fund of knowledge     SUMMARY, RECOMMENDATIONS, AND PLAN       A diagnostic evaluation was conducted to with Janice.      Based on a medical record review, clinical interview of patient, behavioral observations, and any available assessment measures, Janice qualifies for the following DSM-V diagnoses:      Generalized Anxiety Disorder: This diagnosis is retained for Janice. This is based on a long established diagnosis of SERGIO, as well as current self-report during the interview of significant anxiety symptoms that are triggered by a variety of different circumstances and situations, and appear to be generalized.  Major Depressive Disorder, Recurrent, currently in partial remission: This diagnosis is also retained for Janice.  She reported a lifetime of struggling with depression, although its improved recently with different medications and therapy.  Attention Deficit Hyperactivity Disorder - Inattentive: This diagnosis is now added for Janice.  This is based on the clinical interview and self-report ratings where clinically significant problems of inattention, and executive functioning problems, are documented from childhood to adulthood.  Her struggles with ADHD-I have impaired her functioning in a variety of different domains, including home, work, and social relationships.      Based on a medical records review and the results of this encounter, it is recommended that Janice participate in the following behavioral health services.       Mental Health Outpatient Psychotherapy: Participate in individual-focused  CBT skills and habit training with therapeutic processing to improve mental health and wellbeing and reduce ADHD symptoms and impairment, as well as reduce anxiety and depressive symptoms    Psychiatric Evaluation and Services: Consult with a psychiatric care provider for psychiatric diagnostic clarification, medication management, care coordination, and psychotherapy as needed    Primary Care Provider Services: Consult with a PCP for healthcare, medication management, and care coordination as needed         Janice will receive feedback regarding the results of the diagnostic assessment during an upcoming encounter. The results and recommendations will then be discussed and questions answered. In the meantime, it is recommended that Janice to follow up with her current behavioral health providers for care.         If there are any questions regarding this information, please contact the [facility] at [phone].     Jh Cuello, PhD, LP   Professor of Psychiatry and Behavioral Sciences  [facility]

## 2025-07-16 ENCOUNTER — PATIENT OUTREACH (OUTPATIENT)
Dept: CARE COORDINATION | Facility: CLINIC | Age: 55
End: 2025-07-16
Payer: COMMERCIAL

## 2025-07-17 ENCOUNTER — VIRTUAL VISIT (OUTPATIENT)
Dept: PSYCHIATRY | Facility: CLINIC | Age: 55
End: 2025-07-17
Attending: PSYCHOLOGIST
Payer: COMMERCIAL

## 2025-07-17 DIAGNOSIS — F41.1 GAD (GENERALIZED ANXIETY DISORDER): Primary | ICD-10-CM

## 2025-07-17 DIAGNOSIS — F33.41 MAJOR DEPRESSIVE DISORDER, RECURRENT EPISODE, IN PARTIAL REMISSION: ICD-10-CM

## 2025-07-17 DIAGNOSIS — F90.0 ATTENTION DEFICIT HYPERACTIVITY DISORDER (ADHD), PREDOMINANTLY INATTENTIVE TYPE: ICD-10-CM

## 2025-07-17 NOTE — PROGRESS NOTES
[facility]  [address], [address]  [place] [address]  [phone]     OUTPATIENT PSYCHOTHERAPY ENCOUNTER      PATIENT     Name: Cheyenne Schulte  YOB: 1970     SERVICES PROVIDED    Date of Service: 7/17/2025  Time of Service: 10:06 AM to 10:26 AM (20 minutes)   Type of Service: 34852 Individual Psychotherapy (16-37 min)    Service Provider: Jh Cuello, , LP     TELEMEDICINE ENCOUNTER METHODS     Telemedicine psychotherapy was conducted due to the preference Candida during scheduling. The patient's condition was safely assessed and treated via synchronous audio and visual telemedicine encounter. A secure real time interactive audio and visual telecommunication system (videoconference via [employer]) was used for the visit.     Patient Visit Location: Home in [place]      Present For Encounter: Candida    Provider Visit Location: HIPAA compliant location within provider home      ENCOUNTER SYNOPSIS     Cheyenne participated in a psychotherapy session today with Jh Cuello, PhD, LP. Background information, history, current condition, and available medical record notes were reviewed, and a brief clinical interview was conducted with Candida to update the treatment focus and planning as indicated. There was opportunity for Cheyenne to discuss and process recent life happenings. The session included providing Cheyenne with an evidence-informed, strengths-based, whole-person mental health and wellbeing-focused model of psychotherapy (see Treatment Plan and Psychotherapy Interventions). Coordination of care was also planned with other healthcare providers working with Candida if indicated.     BACKGROUND INFORMATION      See diagnostic assessment conducted on 7/9/2025 for details.      PSYCHOTHERAPY FOR THIS ENCOUNTER     Diagnoses Being Treated     Based on the results of the diagnostic assessment conducted on 7/9/2025, Cheyenne qualifies for diagnoses of  generalized anxiety disorder; major depressive disorder, recurrent, currently in partial remission; and ADHD-inattentive type.    Focus of Session    The primary purpose of an focus of the session was to provide Cheyenne with feedback regarding the 7/9/2025 diagnostic assessment.  The results of the diagnostic assessment were reviewed in detail.  Candida was given opportunity to ask questions and voice any concerns.  She appeared to understand the methods and results of the assessment.  She indicated agreement with the conclusions and recommendations.    MENTAL STATUS EXAM     Cheyenne was observed for the following indicators of mental status.     Appearance/Behavior: Adequate grooming, appropriate attire, good eye contact, calm, cooperative, no abnormal movements   Speech: Rate, volume and tone appropriate; no push or pressure; no rapid speech     Mood/Affect: Calm, affect congruent to situation    Thought Process/Content: Coherent, linear; no reports or evidence of auditory hallucinations; no reports or evidence of visual hallucinations    Thought Associations: Logical, no looseness of associations, no flight of ideas, no tangentially, no circumstantiality   Insight/Judgment: Adequate insight into condition and need for treatment; judgement not impaired   Orientation: Alert and oriented to person place, time, and circumstance   Recent and Past Memory: Good   Attention Span/Concentration: Good     Language: English with estimated vocabulary to be average or above    Fund of Knowledge: Estimated to be average or above fund of knowledge      RECOMMENDATIONS     Based on a medical records review and the results of this encounter, it is recommended that Cheyenne participate in the following behavioral health services.      Mental Health Outpatient Psychotherapy: Participate in individual-focused CBT skills and habit training with therapeutic processing to improve mental health and wellbeing and reduce ADHD symptoms and  impairment, as well as reduce anxiety and depressive symptoms    Psychiatric Evaluation and Services: Consult with a psychiatric care provider for psychiatric diagnostic clarification, medication management, care coordination, and psychotherapy as needed    Primary Care Provider Services: Consult with a PCP for healthcare, medication management, and care coordination as needed        DISPOSITION AND PLAN      Psychotherapy was conducted with Cheyenne to provide feedback regarding results and recommendations of the 7/9/2025 diagnostic assessment.      Cheyenne indicated she will follow through with the recommendations.  She will consult with ALTHEA Ramey, MARIANN regarding potentially utilizing medications that target inattention.  Additionally, she will meet with her current psychotherapist, Dr. Mari Fsoter, to determine whether or not ADHD informed CBT psychotherapy is possible with her.  If not, she has the option of working with this provider for that type of service, and will contact this provider if and when needed in the future.     If there are any questions regarding this information please contact the [facility] at [phone].    Jh Cuello, PhD, LP   Professor of Psychiatry and Behavioral Sciences  [facility]

## 2025-07-28 ENCOUNTER — PATIENT OUTREACH (OUTPATIENT)
Dept: GASTROENTEROLOGY | Facility: CLINIC | Age: 55
End: 2025-07-28
Payer: COMMERCIAL

## 2025-07-28 DIAGNOSIS — Z12.11 SPECIAL SCREENING FOR MALIGNANT NEOPLASMS, COLON: Primary | ICD-10-CM

## 2025-07-28 NOTE — PROGRESS NOTES
"Patients last colonoscopy on file was on 10/28/24 and was recommended to repeat in 1 year.  CRC Screening Colonoscopy Referral Review    Patient meets the inclusion criteria for screening colonoscopy standing order.    Ordering/Referring Provider:  Jh Marks      BMI: Estimated body mass index is 32 kg/m  as calculated from the following:    Height as of 3/14/25: 1.619 m (5' 3.75\").    Weight as of 6/6/25: 83.9 kg (185 lb).     Sedation:  Does patient have any of the following conditions affecting sedation?  No medical conditions affecting sedation.    Previous Scopes:  Any previous recommendations or follow up needs based on previous scope?  Sedation recommendations: MAC    Medical Concerns to Postpone Order:  Does patient have any of the following medical concerns that should postpone/delay colonoscopy referral?  No medical conditions affecting colonoscopy referral.    Final Referral Details:  Based on patient's medical history patient is appropriate for referral order with MAC/deep sedation.   BMI<= 45 45 < BMI <= 48 48 < BMI < = 50  BMI > 50   No Restrictions No [facility]  No [facility]  [facility] with exceptions Hospital Only OR Only     "

## 2025-08-03 ENCOUNTER — HEALTH MAINTENANCE LETTER (OUTPATIENT)
Age: 55
End: 2025-08-03

## 2025-08-22 ENCOUNTER — VIRTUAL VISIT (OUTPATIENT)
Dept: PSYCHIATRY | Facility: CLINIC | Age: 55
End: 2025-08-22
Attending: PSYCHOLOGIST
Payer: COMMERCIAL

## 2025-08-22 DIAGNOSIS — F33.41 MAJOR DEPRESSIVE DISORDER, RECURRENT EPISODE, IN PARTIAL REMISSION: ICD-10-CM

## 2025-08-22 DIAGNOSIS — F41.1 GAD (GENERALIZED ANXIETY DISORDER): ICD-10-CM

## 2025-08-22 DIAGNOSIS — F90.0 ATTENTION DEFICIT HYPERACTIVITY DISORDER (ADHD), PREDOMINANTLY INATTENTIVE TYPE: Primary | ICD-10-CM

## 2025-08-22 PROCEDURE — 90832 PSYTX W PT 30 MINUTES: CPT | Mod: 95

## 2025-08-28 ENCOUNTER — ALLIED HEALTH/NURSE VISIT (OUTPATIENT)
Dept: FAMILY MEDICINE | Facility: CLINIC | Age: 55
End: 2025-08-28
Payer: COMMERCIAL

## 2025-08-28 DIAGNOSIS — Z30.9 CONTRACEPTIVE MANAGEMENT: Primary | ICD-10-CM

## 2025-08-28 LAB — HCG UR QL: NEGATIVE

## 2025-08-28 RX ADMIN — MEDROXYPROGESTERONE ACETATE 150 MG: 150 INJECTION, SUSPENSION INTRAMUSCULAR at 10:08

## 2025-09-02 ENCOUNTER — DOCUMENTATION ONLY (OUTPATIENT)
Dept: GASTROENTEROLOGY | Facility: CLINIC | Age: 55
End: 2025-09-02
Payer: COMMERCIAL

## 2025-09-02 DIAGNOSIS — R11.2 NAUSEA AND VOMITING, UNSPECIFIED VOMITING TYPE: Primary | ICD-10-CM

## 2025-09-02 RX ORDER — ONDANSETRON 4 MG/1
4 TABLET, FILM COATED ORAL EVERY 6 HOURS PRN
Qty: 20 TABLET | Refills: 0 | Status: SHIPPED | OUTPATIENT
Start: 2025-09-02

## (undated) DEVICE — PREP CHLORAPREP 26ML TINTED ORANGE  260815

## (undated) DEVICE — TUBING SUCTION MEDI-VAC 1/4"X20' N620A

## (undated) DEVICE — PAD CHUX UNDERPAD 23X24" 7136

## (undated) DEVICE — GOWN IMPERVIOUS 2XL BLUE

## (undated) DEVICE — KIT ENDO FIRST STEP DISINFECTANT 200ML W/POUCH EP-4

## (undated) DEVICE — ENDO TRAP POLYP E-TRAP 00711099

## (undated) DEVICE — SOL WATER IRRIG 1000ML BOTTLE 07139-09

## (undated) DEVICE — SNARE CAPIVATOR ROUND COLD SNR BX10 M00561101

## (undated) DEVICE — CLIP HEMOSTASIS ASSURANCE W16 MM BX00711884

## (undated) DEVICE — SUCTION MANIFOLD NEPTUNE 2 SYS 1 PORT 702-025-000

## (undated) DEVICE — KIT ENDO TURNOVER/PROCEDURE CARRY-ON 101822

## (undated) DEVICE — SPECIMEN CONTAINER 3OZ W/FORMALIN 59901

## (undated) DEVICE — SOL WATER IRRIG 500ML BOTTLE 2F7113

## (undated) RX ORDER — FENTANYL CITRATE 50 UG/ML
INJECTION, SOLUTION INTRAMUSCULAR; INTRAVENOUS
Status: DISPENSED
Start: 2022-07-13

## (undated) RX ORDER — SIMETHICONE 40MG/0.6ML
SUSPENSION, DROPS(FINAL DOSAGE FORM)(ML) ORAL
Status: DISPENSED
Start: 2022-10-28